# Patient Record
Sex: FEMALE | Race: WHITE | NOT HISPANIC OR LATINO | Employment: FULL TIME | URBAN - METROPOLITAN AREA
[De-identification: names, ages, dates, MRNs, and addresses within clinical notes are randomized per-mention and may not be internally consistent; named-entity substitution may affect disease eponyms.]

---

## 2017-02-01 ENCOUNTER — OFFICE VISIT (OUTPATIENT)
Dept: FAMILY MEDICINE CLINIC | Facility: CLINIC | Age: 52
End: 2017-02-01

## 2017-02-01 VITALS
BODY MASS INDEX: 19.28 KG/M2 | OXYGEN SATURATION: 98 % | WEIGHT: 115.7 LBS | SYSTOLIC BLOOD PRESSURE: 116 MMHG | HEIGHT: 65 IN | HEART RATE: 69 BPM | TEMPERATURE: 98.3 F | DIASTOLIC BLOOD PRESSURE: 76 MMHG

## 2017-02-01 DIAGNOSIS — J01.90 ACUTE NON-RECURRENT SINUSITIS, UNSPECIFIED LOCATION: Primary | ICD-10-CM

## 2017-02-01 PROCEDURE — 99213 OFFICE O/P EST LOW 20 MIN: CPT | Performed by: INTERNAL MEDICINE

## 2017-02-01 RX ORDER — AMOXICILLIN AND CLAVULANATE POTASSIUM 875; 125 MG/1; MG/1
1 TABLET, FILM COATED ORAL 2 TIMES DAILY
Qty: 20 TABLET | Refills: 0 | Status: SHIPPED | OUTPATIENT
Start: 2017-02-01 | End: 2017-03-22

## 2017-02-01 NOTE — PROGRESS NOTES
"Subjective   Iliana Reyes is a 51 y.o. female who presents today for:    Sinus Problem    History of Present Illness     Head congestion with some runny nose and sore throat started 1 month ago.  This was treated with prescription strength antihistamine/decongestant to an immediate care center.  She has not been on any antibiotics.  She improved initially, but now has worsening congestion with intermittent headaches.    Mother has been diagnosed with MRSA in the sinuses.    Ms. Reyes  reports that she quit smoking about 3 years ago. She has a 20.00 pack-year smoking history. She has never used smokeless tobacco. She reports that she drinks about 1.2 oz of alcohol per week  She reports that she does not use illicit drugs.         Current Outpatient Prescriptions:   •  betamethasone, augmented, (DIPROLENE) 0.05 % cream, , Disp: , Rfl: 1  •  diphenhydrAMINE (BENADRYL) 25 mg, Take 25 mg by mouth every 6 (six) hours as needed for itching., Disp: , Rfl:   •  estradiol (MINIVELLE, VIVELLE-DOT) 0.05 MG/24HR patch, Place 1 patch on the skin 2 (two) times a week., Disp: 8 patch, Rfl: 12  •  Multiple Vitamins-Minerals (MULTIVITAMIN ADULT PO), Take  by mouth., Disp: , Rfl:   •  Probiotic Product (PROBIOTIC DAILY PO), Take  by mouth., Disp: , Rfl:       The following portions of the patient's history were reviewed and updated as appropriate: allergies, current medications, past social history and problem list.    Review of Systems   HENT: Positive for congestion, postnasal drip, sinus pressure, sneezing and sore throat.    Respiratory: Positive for cough.    Neurological: Positive for headaches.       Objective   Vitals:    02/01/17 1124   BP: 116/76   BP Location: Right arm   Patient Position: Sitting   Cuff Size: Adult   Pulse: 69   Temp: 98.3 °F (36.8 °C)   TempSrc: Oral   SpO2: 98%   Weight: 115 lb 11.2 oz (52.5 kg)   Height: 65\" (165.1 cm)     Physical Exam   Constitutional: She is oriented to person, place, and time. " She appears well-developed and well-nourished. No distress.   HENT:   Right Ear: External ear and ear canal normal. Tympanic membrane is not erythematous. A middle ear effusion is present.   Left Ear: External ear and ear canal normal. Tympanic membrane is not erythematous. A middle ear effusion is present.   Nose: Mucosal edema and rhinorrhea present.   Mouth/Throat: Posterior oropharyngeal edema and posterior oropharyngeal erythema present. No oropharyngeal exudate.   Eyes: Conjunctivae are normal. Right eye exhibits no discharge. Left eye exhibits no discharge. No scleral icterus.   Cardiovascular: Normal rate and regular rhythm.    Pulmonary/Chest: Effort normal and breath sounds normal.   Lymphadenopathy:     She has no cervical adenopathy.        Right: No supraclavicular adenopathy present.        Left: No supraclavicular adenopathy present.   Neurological: She is alert and oriented to person, place, and time.         Assessment/Plan   Iliana was seen today for sinus problem.    Diagnoses and all orders for this visit:    Acute non-recurrent sinusitis, unspecified location  Comments:  Saline nasal spray 3-4 times/day and every 1 hour as needed. And the ABX until gone.    -     amoxicillin-clavulanate (AUGMENTIN) 875-125 MG per tablet; Take 1 tablet by mouth 2 (Two) Times a Day.    RTO prn.

## 2017-02-09 RX ORDER — FLUCONAZOLE 150 MG/1
TABLET ORAL
Qty: 2 TABLET | Refills: 0 | Status: SHIPPED | OUTPATIENT
Start: 2017-02-09 | End: 2017-03-22

## 2017-02-10 ENCOUNTER — OFFICE VISIT (OUTPATIENT)
Dept: FAMILY MEDICINE CLINIC | Facility: CLINIC | Age: 52
End: 2017-02-10

## 2017-02-10 VITALS
OXYGEN SATURATION: 99 % | DIASTOLIC BLOOD PRESSURE: 60 MMHG | HEART RATE: 76 BPM | WEIGHT: 117 LBS | BODY MASS INDEX: 19.49 KG/M2 | TEMPERATURE: 98.2 F | HEIGHT: 65 IN | SYSTOLIC BLOOD PRESSURE: 102 MMHG

## 2017-02-10 DIAGNOSIS — R68.89 FLU-LIKE SYMPTOMS: Primary | ICD-10-CM

## 2017-02-10 DIAGNOSIS — J32.9 CHRONIC SINUSITIS, UNSPECIFIED LOCATION: ICD-10-CM

## 2017-02-10 LAB
EXPIRATION DATE: NORMAL
FLUAV AG NPH QL: NEGATIVE
FLUBV AG NPH QL: NEGATIVE
INTERNAL CONTROL: NORMAL
Lab: NORMAL

## 2017-02-10 PROCEDURE — 87804 INFLUENZA ASSAY W/OPTIC: CPT | Performed by: NURSE PRACTITIONER

## 2017-02-10 PROCEDURE — 99213 OFFICE O/P EST LOW 20 MIN: CPT | Performed by: NURSE PRACTITIONER

## 2017-02-10 RX ORDER — PREDNISONE 10 MG/1
TABLET ORAL
Qty: 1 EACH | Refills: 0 | Status: SHIPPED | OUTPATIENT
Start: 2017-02-10 | End: 2017-03-22

## 2017-02-10 NOTE — PROGRESS NOTES
Subjective   Iliana Reyes is a 51 y.o. female.     Flu Symptoms   This is a chronic (She is a patient of Dr. Reyes, this is my first time seeing her and this is a new problem to me.) problem. Episode onset: She has been feeling ill for several weeks despite trying Stahist and Augmentin.  The problem occurs constantly. The problem has been gradually worsening. Associated symptoms include chills, congestion, coughing, diaphoresis, headaches and myalgias (Generalized). Pertinent negatives include no sore throat (Mild laryngitis) or vomiting. She has tried NSAIDs and position changes for the symptoms. The treatment provided mild relief.     I have reviewed the patient's medical history in detail and updated the computerized patient record.    The following portions of the patient's history were reviewed and updated as appropriate: allergies, current medications, past family history, past medical history, past social history, past surgical history and problem list.    Review of Systems   Constitutional: Positive for chills and diaphoresis.   HENT: Positive for congestion. Negative for sore throat (Mild laryngitis).    Respiratory: Positive for cough.    Gastrointestinal: Negative for vomiting.   Musculoskeletal: Positive for myalgias (Generalized).   Neurological: Positive for headaches.       Objective      Current Outpatient Prescriptions on File Prior to Visit   Medication Sig Dispense Refill   • amoxicillin-clavulanate (AUGMENTIN) 875-125 MG per tablet Take 1 tablet by mouth 2 (Two) Times a Day. 20 tablet 0   • betamethasone, augmented, (DIPROLENE) 0.05 % cream   1   • diphenhydrAMINE (BENADRYL) 25 mg Take 25 mg by mouth every 6 (six) hours as needed for itching.     • estradiol (MINIVELLE, VIVELLE-DOT) 0.05 MG/24HR patch Place 1 patch on the skin 2 (two) times a week. 8 patch 12   • fluconazole (DIFLUCAN) 150 MG tablet 1 now and 1 in 4 days if needed. 2 tablet 0   • Multiple Vitamins-Minerals (MULTIVITAMIN  ADULT PO) Take  by mouth.     • Probiotic Product (PROBIOTIC DAILY PO) Take  by mouth.       No current facility-administered medications on file prior to visit.      Vitals:    02/10/17 1300   BP: 102/60   Pulse: 76   Temp: 98.2 °F (36.8 °C)   SpO2: 99%       Physical Exam   Constitutional: She is oriented to person, place, and time. She appears well-developed and well-nourished.   HENT:   Head: Normocephalic.   Right Ear: Hearing, tympanic membrane, external ear and ear canal normal.   Left Ear: Hearing, external ear and ear canal normal. A middle ear effusion (clear effusion present) is present.   Nose: Mucosal edema (turbinates are red and swollen), rhinorrhea and sinus tenderness present. Right sinus exhibits maxillary sinus tenderness and frontal sinus tenderness. Left sinus exhibits maxillary sinus tenderness and frontal sinus tenderness.   Cardiovascular: Normal rate and regular rhythm.  Exam reveals no gallop and no friction rub.    No murmur heard.  Pulmonary/Chest: Effort normal and breath sounds normal.   Neurological: She is alert and oriented to person, place, and time.   Skin: Skin is warm and dry.   Nursing note and vitals reviewed.      Assessment/Plan   Iliana was seen today for flu symptoms.    Diagnoses and all orders for this visit:    Flu-like symptoms  -     POC Influenza A / B    1. Instructed to finish Augmentin.   2. Prednisone 10 mg (48) tablet pack  3. Instructed to increase water intake and get plenty of rest.   4. May use ibuprofen for pain and inflammation.

## 2017-02-13 LAB
BACTERIA SPEC RESP CULT: NORMAL
BACTERIA SPEC RESP CULT: NORMAL

## 2017-03-07 ENCOUNTER — TELEPHONE (OUTPATIENT)
Dept: GASTROENTEROLOGY | Facility: CLINIC | Age: 52
End: 2017-03-07

## 2017-03-07 NOTE — TELEPHONE ENCOUNTER
Received letter from [facility] that Dexilant is not covered by the plan. They did cover it last year. I resubmitted the PA as an appeal with additional documentation.

## 2017-03-10 ENCOUNTER — TELEPHONE (OUTPATIENT)
Dept: GASTROENTEROLOGY | Facility: CLINIC | Age: 52
End: 2017-03-10

## 2017-03-10 RX ORDER — PANTOPRAZOLE SODIUM 40 MG/1
40 TABLET, DELAYED RELEASE ORAL DAILY
Qty: 90 TABLET | Refills: 1 | Status: SHIPPED | OUTPATIENT
Start: 2017-03-10 | End: 2018-08-13 | Stop reason: ALTCHOICE

## 2017-03-10 NOTE — TELEPHONE ENCOUNTER
"PA denied for Dexilant. \"The medication requested is not covered by [employer] and [facility] for the member based on current plan benefits.\"  "

## 2017-03-10 NOTE — TELEPHONE ENCOUNTER
Pt called and advised that her pa was denied on her dexilant.  Pt verb understanding and requested we try a new ppi.  Advised the pt we would escribe pantoprazole to her pharmacy.  Pt verb understanding.

## 2017-03-22 ENCOUNTER — OFFICE VISIT (OUTPATIENT)
Dept: FAMILY MEDICINE CLINIC | Facility: CLINIC | Age: 52
End: 2017-03-22

## 2017-03-22 VITALS
SYSTOLIC BLOOD PRESSURE: 124 MMHG | OXYGEN SATURATION: 98 % | BODY MASS INDEX: 20.16 KG/M2 | TEMPERATURE: 98 F | WEIGHT: 121 LBS | HEART RATE: 81 BPM | DIASTOLIC BLOOD PRESSURE: 70 MMHG | HEIGHT: 65 IN

## 2017-03-22 DIAGNOSIS — J32.9 CHRONIC SINUSITIS, UNSPECIFIED LOCATION: Primary | ICD-10-CM

## 2017-03-22 PROCEDURE — 99213 OFFICE O/P EST LOW 20 MIN: CPT | Performed by: NURSE PRACTITIONER

## 2017-03-22 RX ORDER — LUBIPROSTONE 8 UG/1
8 CAPSULE, GELATIN COATED ORAL 2 TIMES DAILY
Refills: 3 | COMMUNITY
Start: 2017-03-06 | End: 2017-10-30 | Stop reason: SINTOL

## 2017-03-22 RX ORDER — DOXYCYCLINE HYCLATE 100 MG/1
100 TABLET, DELAYED RELEASE ORAL DAILY
Qty: 21 TABLET | Refills: 0 | Status: SHIPPED | OUTPATIENT
Start: 2017-03-22 | End: 2017-10-30

## 2017-03-22 RX ORDER — FLUCONAZOLE 100 MG/1
100 TABLET ORAL DAILY
Qty: 3 TABLET | Refills: 2 | Status: SHIPPED | OUTPATIENT
Start: 2017-03-22 | End: 2017-10-30

## 2017-03-22 NOTE — PROGRESS NOTES
Subjective   Iliana Reyes is a 51 y.o. female who presents today for:    Nasal Congestion; Ear Fullness; and Sore Throat    URI    This is a recurrent problem. The current episode started more than 1 month ago (since the first of the year). The problem has been waxing and waning. There has been no fever. Associated symptoms include congestion, coughing, a plugged ear sensation, rhinorrhea and a sore throat. She has tried antihistamine and decongestant (was on antibiotics at one point) for the symptoms. The treatment provided mild relief.      I have reviewed the patient's medical history in detail and updated the computerized patient record.    Ms. Reyes  reports that she quit smoking about 3 years ago. She has a 20.00 pack-year smoking history. She has never used smokeless tobacco. She reports that she drinks about 1.2 oz of alcohol per week  She reports that she does not use illicit drugs.         Current Outpatient Prescriptions:   •  AMITIZA 8 MCG capsule, Take 8 mcg by mouth 2 (Two) Times a Day., Disp: , Rfl: 3  •  betamethasone, augmented, (DIPROLENE) 0.05 % cream, , Disp: , Rfl: 1  •  diphenhydrAMINE (BENADRYL) 25 mg, Take 25 mg by mouth every 6 (six) hours as needed for itching., Disp: , Rfl:   •  estradiol (MINIVELLE, VIVELLE-DOT) 0.05 MG/24HR patch, Place 1 patch on the skin 2 (two) times a week., Disp: 8 patch, Rfl: 12  •  Multiple Vitamins-Minerals (MULTIVITAMIN ADULT PO), Take  by mouth., Disp: , Rfl:   •  pantoprazole (PROTONIX) 40 MG EC tablet, Take 1 tablet by mouth Daily., Disp: 90 tablet, Rfl: 1      The following portions of the patient's history were reviewed and updated as appropriate: allergies, current medications, past social history and problem list.    Review of Systems   Constitutional: Negative.    HENT: Positive for congestion, postnasal drip, rhinorrhea, sinus pressure, sore throat and voice change.    Respiratory: Positive for cough. Negative for shortness of breath.   "  Cardiovascular: Negative.    All other systems reviewed and are negative.        Objective   Vitals:    03/22/17 1552   BP: 124/70   BP Location: Left arm   Patient Position: Sitting   Cuff Size: Small Adult   Pulse: 81   Temp: 98 °F (36.7 °C)   TempSrc: Oral   SpO2: 98%   Weight: 121 lb (54.9 kg)   Height: 65\" (165.1 cm)     Physical Exam   HENT:   Right Ear: Hearing, tympanic membrane, external ear and ear canal normal.   Left Ear: Hearing, tympanic membrane, external ear and ear canal normal.   Nose: Mucosal edema (turbintes are red and swollen) and rhinorrhea (clear drainage) present. Right sinus exhibits maxillary sinus tenderness and frontal sinus tenderness. Left sinus exhibits maxillary sinus tenderness and frontal sinus tenderness.   Mouth/Throat: Uvula is midline and mucous membranes are normal. Posterior oropharyngeal erythema present. No oropharyngeal exudate.   Neck: Normal range of motion. Neck supple. No JVD present. No tracheal deviation present. No thyromegaly present.   Cardiovascular: Normal rate, regular rhythm, normal heart sounds and intact distal pulses.    Pulmonary/Chest: Effort normal and breath sounds normal. She has no wheezes. She has no rales.   Lymphadenopathy:     She has no cervical adenopathy.         Assessment/Plan   Iliana was seen today for nasal congestion, ear fullness and sore throat.    Diagnoses and all orders for this visit:    Chronic sinusitis, unspecified location  -     fluconazole (DIFLUCAN) 100 MG tablet; Take 1 tablet by mouth Daily.  -     doxycycline (DORYX) 100 MG enteric coated tablet; Take 1 tablet by mouth Daily.    You have been diagnosed with acute sinusitis. You have been prescribed an antibiotic along with symptomatic treatment.  You may take Mucinex D for relieving congestion and cough.  If you have high blood pressure, do not take Mucinex D, instead opting for plain Mucinex and Coricidin HBP.  Take Ibuprofen or Tylenol as needed for pain or fever.  Oral " antihistamine, such as Zyrtec or Claritin may help reduce ear pressure and relieve some nasal symptoms.  A saline nasal spray may be used to keep nose clear from discharge.  Be sure that you are increasing your intake of clear to decaffeinated fluids and get plenty of rest.  If your symptoms worsen or persist follow up as needed.

## 2017-03-30 ENCOUNTER — OFFICE VISIT (OUTPATIENT)
Dept: OBSTETRICS AND GYNECOLOGY | Facility: CLINIC | Age: 52
End: 2017-03-30

## 2017-03-30 ENCOUNTER — HOSPITAL ENCOUNTER (OUTPATIENT)
Dept: MAMMOGRAPHY | Facility: HOSPITAL | Age: 52
Discharge: HOME | End: 2017-03-30
Admitting: NURSE PRACTITIONER

## 2017-03-30 VITALS
SYSTOLIC BLOOD PRESSURE: 114 MMHG | BODY MASS INDEX: 19.69 KG/M2 | HEIGHT: 65 IN | DIASTOLIC BLOOD PRESSURE: 64 MMHG | WEIGHT: 118.2 LBS

## 2017-03-30 DIAGNOSIS — Z12.31 ENCOUNTER FOR SCREENING MAMMOGRAM FOR BREAST CANCER: ICD-10-CM

## 2017-03-30 DIAGNOSIS — Z01.419 WELL WOMAN EXAM WITH ROUTINE GYNECOLOGICAL EXAM: Primary | ICD-10-CM

## 2017-03-30 DIAGNOSIS — N95.1 VASOMOTOR SYMPTOMS DUE TO MENOPAUSE: ICD-10-CM

## 2017-03-30 PROCEDURE — 99396 PREV VISIT EST AGE 40-64: CPT | Performed by: NURSE PRACTITIONER

## 2017-03-30 PROCEDURE — G0202 SCR MAMMO BI INCL CAD: HCPCS

## 2017-03-30 RX ORDER — ESTRADIOL 0.03 MG/D
1 FILM, EXTENDED RELEASE TRANSDERMAL 2 TIMES WEEKLY
Qty: 4 PATCH | Refills: 12 | Status: SHIPPED | OUTPATIENT
Start: 2017-03-30 | End: 2018-02-05 | Stop reason: SDUPTHER

## 2017-03-30 NOTE — PROGRESS NOTES
Subjective   History of Present Illness    Iliana Reyes is a 51 y.o. female who presents for annual exam. She is a former pt of Dr. Mensah. Last exam 2016. Hysterectomy w/ bso  for bleeding. She denies any hx at all of abnormal pap smears and states she stopped getting paps after her hysterectomy. She has been on Minivelle since her hysterectomy for vasomotor sxs. She states the minivelle controls her hot flashes well but has noticed significant breast tenderness x about 5 months. She denies any lumps, associated skin changes, or nipple changes bilaterally. She had her screening mammogram today and states she has had normal yearly mammograms to date.  Colonoscopy less than a year. No other complaints today.    Chief Complaint: Iliana presents for annual exam     Obstetric History:  OB History      Para Term  AB TAB SAB Ectopic Multiple Living    2 2 2                Menstrual History:     No LMP recorded (lmp unknown). Patient has had a hysterectomy.       Sexual History:     The following portions of the patient's history were reviewed and updated as appropriate: allergies, current medications, past family history, past medical history, past social history, past surgical history and problem list.      Review of Systems   Constitutional: Negative.    HENT: Negative.    Eyes: Negative for visual disturbance.   Respiratory: Negative for cough, shortness of breath and wheezing.    Cardiovascular: Negative for chest pain, palpitations and leg swelling.   Gastrointestinal: Negative for abdominal distention, abdominal pain, blood in stool, constipation, diarrhea, nausea and vomiting.   Endocrine: Negative for cold intolerance and heat intolerance.   Genitourinary: Negative for difficulty urinating, dyspareunia, dysuria, frequency, genital sores, hematuria, menstrual problem, pelvic pain, urgency, vaginal bleeding, vaginal discharge and vaginal pain.   Musculoskeletal: Negative.    Skin:  "Negative.    Neurological: Negative for dizziness, weakness, light-headedness, numbness and headaches.   Hematological: Negative.    Psychiatric/Behavioral: Negative.    Breasts: see HPI         Objective   Physical Exam   Constitutional: She is oriented to person, place, and time. She appears well-developed and well-nourished. No distress.   Neck: No thyromegaly present.   Cardiovascular: Normal rate, regular rhythm and normal heart sounds.    No murmur heard.  Pulmonary/Chest: Effort normal and breath sounds normal. No respiratory distress. She has no wheezes. Right breast exhibits tenderness. Right breast exhibits no mass, no nipple discharge and no skin change. Left breast exhibits tenderness. Left breast exhibits no mass, no nipple discharge and no skin change. Breasts are symmetrical. There is no breast swelling.   Abdominal: Soft. Bowel sounds are normal. She exhibits no distension. There is no tenderness.   Genitourinary: Vagina normal and uterus normal. There is no rash, tenderness or lesion on the right labia. There is no rash, tenderness or lesion on the left labia. Uterus is not enlarged and not tender. Cervix exhibits no motion tenderness and no discharge. Right adnexum displays no mass, no tenderness and no fullness. Left adnexum displays no mass, no tenderness and no fullness. No bleeding in the vagina. No vaginal discharge found.   Musculoskeletal: Normal range of motion. She exhibits no edema.   Lymphadenopathy:     She has no cervical adenopathy.   Neurological: She is alert and oriented to person, place, and time.   Skin: No rash noted. No erythema. No pallor.   Psychiatric: She has a normal mood and affect. Her behavior is normal. Judgment and thought content normal.       /64  Ht 65\" (165.1 cm)  Wt 118 lb 3.2 oz (53.6 kg)  LMP  (LMP Unknown)  Breastfeeding? No  BMI 19.67 kg/m2    Assessment/Plan   Iliana was seen today for gynecologic exam.    Diagnoses and all orders for this " visit:    Well woman exam with routine gynecological exam    Vasomotor symptoms due to menopause    Other orders  -     estradiol (MINIVELLE) 0.025 MG/24HR patch; Place 1 patch on the skin 2 (Two) Times a Week.      All questions answered.  Breast self exam technique reviewed and patient encouraged to perform self-exam monthly.  Discussed healthy lifestyle modifications.      Normal gynecological exam today. Counseled Iliana on paps after hysterectomy and the small chance of picking up vaginal cancer on a pap. Iliana declines a pap today.  Counseled on breast tenderness likely related to minivelle therapy. Risks, benefits, and alternatives of HRT discussed. [facility] initiative discussed. Recommend HRT for the shortest duration of time on the lowest effect dose. Iliana verbalizes understanding and desires to continue on HRT at this time but is willing to adjust current therapy.  Recommend we lower minivelle dose and monitor breast/vasomotor sxs. She will call if breast tenderness does not resolve in several weeks. Await mammogram results as well. Iliana is to f/u yearly or sooner for problems.    Naty Gautam, APRN

## 2017-04-03 ENCOUNTER — OFFICE VISIT (OUTPATIENT)
Dept: FAMILY MEDICINE CLINIC | Facility: CLINIC | Age: 52
End: 2017-04-03

## 2017-04-03 VITALS
DIASTOLIC BLOOD PRESSURE: 78 MMHG | TEMPERATURE: 97.7 F | HEART RATE: 78 BPM | BODY MASS INDEX: 19.61 KG/M2 | WEIGHT: 117.7 LBS | OXYGEN SATURATION: 100 % | HEIGHT: 65 IN | SYSTOLIC BLOOD PRESSURE: 100 MMHG

## 2017-04-03 DIAGNOSIS — J01.91 ACUTE RECURRENT SINUSITIS, UNSPECIFIED LOCATION: ICD-10-CM

## 2017-04-03 DIAGNOSIS — J30.9 ALLERGIC RHINITIS, UNSPECIFIED ALLERGIC RHINITIS TRIGGER, UNSPECIFIED RHINITIS SEASONALITY: Primary | ICD-10-CM

## 2017-04-03 PROCEDURE — 99213 OFFICE O/P EST LOW 20 MIN: CPT | Performed by: NURSE PRACTITIONER

## 2017-04-03 NOTE — PROGRESS NOTES
Subjective   Iliana Reyes is a 51 y.o. female who presents today for:    URI (x 10 days); Shortness of Breath; and Sore Throat    HPI Comments: Ms Reyes presents today with complaints of cough, sinus congestion, and her throat feels swollen. She was seen by me on 3/22/17 for these same symptoms. She was prescribed a 21 day course of Doxycycline 100 mg. She was also advised to take Mucinex D and an OTC antihistamine for her symptoms. She reports that she does not feel any better, but she does not feel as if her symptoms are worsening.    URI    This is a chronic problem. The current episode started 1 to 4 weeks ago. The problem has been unchanged. There has been no fever. Associated symptoms include congestion and coughing. Sore throat: feels swollen. She has tried antihistamine (Doxycyclin) for the symptoms. The treatment provided no relief.      I have reviewed the patient's medical history in detail and updated the computerized patient record.    Ms. Reyes  reports that she quit smoking about 3 years ago. She has a 20.00 pack-year smoking history. She has never used smokeless tobacco. She reports that she drinks about 1.2 oz of alcohol per week  She reports that she does not use illicit drugs.         Current Outpatient Prescriptions:   •  AMITIZA 8 MCG capsule, Take 8 mcg by mouth 2 (Two) Times a Day., Disp: , Rfl: 3  •  betamethasone, augmented, (DIPROLENE) 0.05 % cream, , Disp: , Rfl: 1  •  diphenhydrAMINE (BENADRYL) 25 mg, Take 25 mg by mouth every 6 (six) hours as needed for itching., Disp: , Rfl:   •  doxycycline (DORYX) 100 MG enteric coated tablet, Take 1 tablet by mouth Daily., Disp: 21 tablet, Rfl: 0  •  estradiol (MINIVELLE) 0.025 MG/24HR patch, Place 1 patch on the skin 2 (Two) Times a Week., Disp: 4 patch, Rfl: 12  •  Multiple Vitamins-Minerals (MULTIVITAMIN ADULT PO), Take  by mouth., Disp: , Rfl:   •  pantoprazole (PROTONIX) 40 MG EC tablet, Take 1 tablet by mouth Daily., Disp: 90 tablet, Rfl:  "1  •  fluconazole (DIFLUCAN) 100 MG tablet, Take 1 tablet by mouth Daily., Disp: 3 tablet, Rfl: 2      The following portions of the patient's history were reviewed and updated as appropriate: allergies, current medications, past social history and problem list.    Review of Systems   Constitutional: Negative.  Negative for chills, fatigue and fever.   HENT: Positive for congestion and sinus pressure. Sore throat: feels swollen.    Respiratory: Positive for cough and shortness of breath (at times).    Cardiovascular: Negative.    Musculoskeletal: Negative.    Skin: Negative.    Neurological: Negative.          Objective   Vitals:    04/03/17 1409   BP: 100/78   BP Location: Left arm   Patient Position: Sitting   Cuff Size: Adult   Pulse: 78   Temp: 97.7 °F (36.5 °C)   TempSrc: Oral   SpO2: 100%   Weight: 117 lb 11.2 oz (53.4 kg)   Height: 65\" (165.1 cm)     Physical Exam   Constitutional: She is oriented to person, place, and time. She appears well-developed and well-nourished.   HENT:   Right Ear: Hearing, tympanic membrane, external ear and ear canal normal.   Left Ear: Hearing, tympanic membrane, external ear and ear canal normal.   Nose: Mucosal edema (turbinates are red and swollen) and rhinorrhea (clear drainage) present. Right sinus exhibits no maxillary sinus tenderness and no frontal sinus tenderness. Left sinus exhibits no maxillary sinus tenderness and no frontal sinus tenderness.   Mouth/Throat: Uvula is midline, oropharynx is clear and moist and mucous membranes are normal.   Neck: Normal range of motion. Neck supple.   Cardiovascular: Normal rate, regular rhythm, normal heart sounds and intact distal pulses.    Pulmonary/Chest: Effort normal and breath sounds normal. No respiratory distress. She has no wheezes. She has no rales.   Lymphadenopathy:     She has no cervical adenopathy.   Neurological: She is alert and oriented to person, place, and time.   Skin: Skin is warm and dry.   Vitals " reviewed.        Assessment/Plan   Iliana was seen today for uri, shortness of breath and sore throat.    Diagnoses and all orders for this visit:    Allergic rhinitis, unspecified allergic rhinitis trigger, unspecified rhinitis seasonality    Acute recurrent sinusitis, unspecified location    1. She is to continue the antibiotic as prescribed until she has finished it. I have advised her to use a saline nasal rinse twice a day and to switch to Xyxal OTC instead of the OTC antihistamine she is currently using. We have discussed that it may take several weeks for her to see an improvement in her symptoms because of the underlying allergy symptoms she is also having. We have discussed that if she does not feel any better in 2 weeks we will consider sending her to an ENT.  2. Follow up as needed.

## 2017-05-10 ENCOUNTER — TELEPHONE (OUTPATIENT)
Dept: OBSTETRICS AND GYNECOLOGY | Facility: CLINIC | Age: 52
End: 2017-05-10

## 2017-08-18 ENCOUNTER — TELEPHONE (OUTPATIENT)
Dept: OBSTETRICS AND GYNECOLOGY | Facility: CLINIC | Age: 52
End: 2017-08-18

## 2017-10-30 ENCOUNTER — OFFICE VISIT (OUTPATIENT)
Dept: FAMILY MEDICINE CLINIC | Facility: CLINIC | Age: 52
End: 2017-10-30

## 2017-10-30 VITALS
WEIGHT: 118.5 LBS | HEIGHT: 65 IN | TEMPERATURE: 98.7 F | OXYGEN SATURATION: 98 % | BODY MASS INDEX: 19.74 KG/M2 | SYSTOLIC BLOOD PRESSURE: 104 MMHG | DIASTOLIC BLOOD PRESSURE: 72 MMHG | HEART RATE: 73 BPM

## 2017-10-30 DIAGNOSIS — M54.31 SCIATICA OF RIGHT SIDE: ICD-10-CM

## 2017-10-30 DIAGNOSIS — M25.551 PAIN OF RIGHT HIP JOINT: Primary | ICD-10-CM

## 2017-10-30 PROCEDURE — 99214 OFFICE O/P EST MOD 30 MIN: CPT | Performed by: NURSE PRACTITIONER

## 2017-10-30 NOTE — PROGRESS NOTES
Subjective   Iliana Reyes is a 51 y.o. female who presents today for:    Hip Pain (Rt hip pain from hip that radiates down leg. No injury)    HPI Comments: Ms. Reyes presents today complaining of right hip pain x 2-3 weeks. She denies any mechanism of injury or fall. The pain originates in her right buttock and radiates around to the right hip where it is the worst, and down the right leg. Pain described as shooting/aching, as is worse when lying down. She states that she is having trouble crossing her legs when sitting because of the pain. The pain has not affected her gait. She denies swelling, numbness, or tingling to either lower extremity. Denies popping or cracking sounds upon movement. Treatments that she has tried at home include naproxen and hot baths, which she states have not helped her pain.      I have reviewed the patient's medical history in detail and updated the computerized patient record.    Ms. Reyes  reports that she quit smoking about 3 years ago. She has a 20.00 pack-year smoking history. She has never used smokeless tobacco. She reports that she drinks about 1.2 oz of alcohol per week  She reports that she does not use illicit drugs.     Allergies   Allergen Reactions   • Codeine Hives   • Pantoprazole Nausea And Vomiting       Current Outpatient Prescriptions:   •  betamethasone, augmented, (DIPROLENE) 0.05 % cream, , Disp: , Rfl: 1  •  diphenhydrAMINE (BENADRYL) 25 mg, Take 25 mg by mouth every 6 (six) hours as needed for itching., Disp: , Rfl:   •  estradiol (MINIVELLE) 0.025 MG/24HR patch, Place 1 patch on the skin 2 (Two) Times a Week., Disp: 4 patch, Rfl: 12  •  Multiple Vitamins-Minerals (MULTIVITAMIN ADULT PO), Take  by mouth., Disp: , Rfl:   •  pantoprazole (PROTONIX) 40 MG EC tablet, Take 1 tablet by mouth Daily., Disp: 90 tablet, Rfl: 1      Review of Systems   Constitutional: Negative for activity change, chills, fatigue and fever.   HENT: Negative for congestion, postnasal  "drip, rhinorrhea, sinus pain and sore throat.    Eyes: Negative.    Respiratory: Negative for cough, chest tightness, shortness of breath and wheezing.    Cardiovascular: Negative for chest pain, palpitations and leg swelling.   Gastrointestinal: Negative for abdominal distention, abdominal pain, constipation, diarrhea, nausea and vomiting.   Endocrine: Negative.    Genitourinary: Negative for dysuria, frequency and urgency.   Musculoskeletal:        Right hip shooting/aching pain and stiffness   Skin: Negative for color change, rash and wound.   Neurological: Negative.  Negative for weakness and numbness.   Psychiatric/Behavioral: Negative.          Objective   Vitals:    10/30/17 0841   BP: 104/72   BP Location: Left arm   Patient Position: Sitting   Cuff Size: Adult   Pulse: 73   Temp: 98.7 °F (37.1 °C)   TempSrc: Oral   SpO2: 98%   Weight: 118 lb 8 oz (53.8 kg)   Height: 65\" (165.1 cm)     Physical Exam   Constitutional: She is oriented to person, place, and time. She appears well-developed and well-nourished.   HENT:   Head: Normocephalic.   Eyes: EOM are normal. Pupils are equal, round, and reactive to light.   Musculoskeletal: She exhibits tenderness (right hip tenderness on movement). She exhibits no edema.        Right hip: She exhibits decreased range of motion and tenderness. She exhibits no swelling and no crepitus.   Right hip aching/shooting pain on lying and crossing legs    Neurological: She is alert and oriented to person, place, and time.   Skin: Skin is warm and dry.   Psychiatric:   No acute abnormality   Vitals reviewed.        Assessment/Plan   Iliana was seen today for hip pain.    Diagnoses and all orders for this visit:    Pain of right hip joint  -     Ambulatory Referral to Physical Therapy Evaluate and treat    Sciatica of right side    1. I have ordered physical therapy, as patient's symptoms have persisted for three weeks and NSAIDs have not helped. Educated patient to continue hot baths " for relief, and to try tylenol or ibuprofen in the meantime, as naproxen hasn't provided relief.     2. Follow up as needed if symptoms worsen or persist, and at next scheduled appointment.

## 2018-02-05 RX ORDER — ESTRADIOL 0.03 MG/D
FILM, EXTENDED RELEASE TRANSDERMAL
Qty: 8 PATCH | Refills: 0 | Status: SHIPPED | OUTPATIENT
Start: 2018-02-05 | End: 2018-02-28 | Stop reason: SDUPTHER

## 2018-02-16 RX ORDER — LUBIPROSTONE 8 UG/1
CAPSULE, GELATIN COATED ORAL
Qty: 180 CAPSULE | Refills: 0 | OUTPATIENT
Start: 2018-02-16

## 2018-03-08 ENCOUNTER — TRANSCRIBE ORDERS (OUTPATIENT)
Dept: ADMINISTRATIVE | Facility: HOSPITAL | Age: 53
End: 2018-03-08

## 2018-03-08 DIAGNOSIS — Z12.31 VISIT FOR SCREENING MAMMOGRAM: Primary | ICD-10-CM

## 2018-03-12 RX ORDER — ESTRADIOL 0.03 MG/D
FILM, EXTENDED RELEASE TRANSDERMAL
Qty: 8 PATCH | Refills: 0 | Status: SHIPPED | OUTPATIENT
Start: 2018-03-12 | End: 2018-03-13 | Stop reason: SDUPTHER

## 2018-03-12 RX ORDER — ESTRADIOL 0.03 MG/D
FILM, EXTENDED RELEASE TRANSDERMAL
Qty: 8 PATCH | Refills: 0 | OUTPATIENT
Start: 2018-03-12

## 2018-03-13 RX ORDER — LUBIPROSTONE 8 UG/1
CAPSULE, GELATIN COATED ORAL
Qty: 180 CAPSULE | Refills: 0 | OUTPATIENT
Start: 2018-03-13

## 2018-03-13 RX ORDER — ESTRADIOL 0.03 MG/D
FILM, EXTENDED RELEASE TRANSDERMAL
Qty: 8 PATCH | Refills: 0 | Status: SHIPPED | OUTPATIENT
Start: 2018-03-13 | End: 2018-04-02 | Stop reason: SDUPTHER

## 2018-04-02 ENCOUNTER — HOSPITAL ENCOUNTER (OUTPATIENT)
Dept: MAMMOGRAPHY | Facility: HOSPITAL | Age: 53
Discharge: HOME | End: 2018-04-02
Admitting: NURSE PRACTITIONER

## 2018-04-02 ENCOUNTER — OFFICE VISIT (OUTPATIENT)
Dept: OBSTETRICS AND GYNECOLOGY | Facility: CLINIC | Age: 53
End: 2018-04-02

## 2018-04-02 VITALS
BODY MASS INDEX: 27.4 KG/M2 | HEIGHT: 55 IN | DIASTOLIC BLOOD PRESSURE: 76 MMHG | WEIGHT: 118.4 LBS | SYSTOLIC BLOOD PRESSURE: 118 MMHG

## 2018-04-02 DIAGNOSIS — Z01.419 WELL WOMAN EXAM WITH ROUTINE GYNECOLOGICAL EXAM: Primary | ICD-10-CM

## 2018-04-02 DIAGNOSIS — Z12.31 VISIT FOR SCREENING MAMMOGRAM: ICD-10-CM

## 2018-04-02 PROCEDURE — 99396 PREV VISIT EST AGE 40-64: CPT | Performed by: NURSE PRACTITIONER

## 2018-04-02 PROCEDURE — 77067 SCR MAMMO BI INCL CAD: CPT

## 2018-04-02 RX ORDER — ESTRADIOL 0.03 MG/D
1 FILM, EXTENDED RELEASE TRANSDERMAL 2 TIMES WEEKLY
Qty: 8 PATCH | Refills: 12 | Status: SHIPPED | OUTPATIENT
Start: 2018-04-02 | End: 2019-02-19 | Stop reason: SDUPTHER

## 2018-04-02 NOTE — PROGRESS NOTES
[facility]  Routine Annual Visit    2018    Patient: Iliana Reyes          MR#:[id number]      History of Present Illness     52 y.o. female  who presents for annual exam. Iliana reports breast tenderness has resolved since lowering her minivelle dose to .025. She reports occasional breakthrough night sweats now, but states it is worth not having the breast discomfort. Hysterectomy . Mammogram done today at [facility]. Iliana denies new medical hx. No complaints today. Reports she is up to date on colonoscopy.    No LMP recorded (lmp unknown). Patient has had a hysterectomy.  Obstetric History:  OB History      Para Term  AB Living    2 2 2       SAB TAB Ectopic Molar Multiple Live Births                  Menstrual History:     No LMP recorded (lmp unknown). Patient has had a hysterectomy.       Sexual History:       ________________________________________  Patient Active Problem List   Diagnosis   (none) - all problems resolved or deleted       Past Medical History:   Diagnosis Date   • Chest pain    • Dyspepsia    • Gastric ulcer    • GERD (gastroesophageal reflux disease)    • Headache    • Tubular adenoma of colon        Past Surgical History:   Procedure Laterality Date   • CHOLECYSTECTOMY      Dr. EDER Alba   • COLONOSCOPY  2016    polyp, tics, tubular adenoma   • ENDOSCOPY  2016    gastric ulcer w/clean base, acute gastritis. Leiva's esophagus   • HYSTERECTOMY     • OOPHORECTOMY     • SHOULDER ARTHROSCOPY         History   Smoking Status   • Former Smoker   • Packs/day: 1.00   • Years: 20.00   • Quit date:    Smokeless Tobacco   • Never Used       Family History   Problem Relation Age of Onset   • Heart disease Mother    • Inflammatory bowel disease Mother    • Cerebral aneurysm Father    • Crohn's disease Brother    • Crohn's disease Cousin    • Crohn's disease Cousin        Prior to Admission medications    Medication Sig Start Date End Date  "Taking? Authorizing Provider   betamethasone, augmented, (DIPROLENE) 0.05 % cream  1/28/16  Yes Historical Provider, MD   diphenhydrAMINE (BENADRYL) 25 mg Take 25 mg by mouth every 6 (six) hours as needed for itching.   Yes Historical Provider, MD   estradiol (VIVELLE-DOT) 0.025 MG/24HR patch PLACE 1 PATCH ONTO THE SKIN TWICE A WEEK 3/13/18  Yes OCTAVIO Reyes   Multiple Vitamins-Minerals (MULTIVITAMIN ADULT PO) Take  by mouth.   Yes Historical Provider, MD   pantoprazole (PROTONIX) 40 MG EC tablet Take 1 tablet by mouth Daily. 3/10/17   Cachorro Lyons MD     ________________________________________    The following portions of the patient's history were reviewed and updated as appropriate: allergies, current medications, past family history, past medical history, past social history, past surgical history and problem list.    Review of Systems   Constitutional: Negative.    HENT: Negative.    Eyes: Negative for visual disturbance.   Respiratory: Negative for cough, shortness of breath and wheezing.    Cardiovascular: Negative for chest pain, palpitations and leg swelling.   Gastrointestinal: Negative for abdominal distention, abdominal pain, blood in stool, constipation, diarrhea, nausea and vomiting.   Endocrine: Negative for cold intolerance and heat intolerance.   Genitourinary: Negative for difficulty urinating, dyspareunia, dysuria, frequency, genital sores, hematuria, menstrual problem, pelvic pain, urgency, vaginal bleeding, vaginal discharge and vaginal pain.   Musculoskeletal: Negative.    Skin: Negative.    Neurological: Negative for dizziness, weakness, light-headedness, numbness and headaches.   Hematological: Negative.    Psychiatric/Behavioral: Negative.    Breasts: negative for lumps skin changes, dimpling, swelling, nipple changes/discharge bilaterally         Objective   Physical Exam    /76   Ht 65 cm (25.59\")   Wt 53.7 kg (118 lb 6.4 oz)   LMP  (LMP Unknown) Comment: complete  " "Breastfeeding? No   .11 kg/m²    BP Readings from Last 3 Encounters:   04/02/18 118/76   10/30/17 104/72   04/03/17 100/78      Wt Readings from Last 3 Encounters:   04/02/18 53.7 kg (118 lb 6.4 oz)   10/30/17 53.8 kg (118 lb 8 oz)   04/03/17 53.4 kg (117 lb 11.2 oz)        BMI: Estimated body mass index is 127.11 kg/m² as calculated from the following:    Height as of this encounter: 65 cm (25.59\").    Weight as of this encounter: 53.7 kg (118 lb 6.4 oz).      General:   alert, appears stated age and cooperative   Heart: regular rate and rhythm, S1, S2 normal, no murmur, click, rub or gallop   Lungs: clear to auscultation bilaterally   Abdomen: soft, non-tender, without masses or organomegaly   Breast: inspection negative, no nipple discharge or bleeding, no masses or nodularity palpable   Vulva: normal   Vagina: normal mucosa, normal discharge, vaginal cuff normal   Cervix: absent   Uterus: absent    Adnexa: no mass, fullness, tenderness     Assessment:    1. Normal annual exam   2. HRT- counseled on risks, benefits, alternatives. Patient has been made aware of the black box warming from FDA about heart attacks, strokes, breast cancer, uterine cancer, and VTE risks. WHI discussed. Suggest lowest dose for shortest duration. Pt understands the risks and desires to continue her therapy.  3. Healthy lifestyle modifications discussed, counseled on self breast exams and bone health      Plan:    [x]  Rx: minivelle  [x]  Mammogram today  [x]  PAP done  []  Occult fecal blood test (Ins[facility])  []  Labs:   []  GC/Chl/TV  []  DEXA scan   []  Referral for colonoscopy:           OCTAVIO Barlow  4/2/2018 9:04 AM  "

## 2018-04-06 LAB
CONV .: ABNORMAL
CYTOLOGIST CVX/VAG CYTO: ABNORMAL
CYTOLOGY CVX/VAG DOC THIN PREP: ABNORMAL
DX ICD CODE: ABNORMAL
DX ICD CODE: ABNORMAL
HIV 1 & 2 AB SER-IMP: ABNORMAL
HPV I/H RISK 1 DNA CVX QL PROBE+SIG AMP: POSITIVE
OTHER STN SPEC: ABNORMAL
PATH REPORT.FINAL DX SPEC: ABNORMAL
PATHOLOGIST CVX/VAG CYTO: ABNORMAL
STAT OF ADQ CVX/VAG CYTO-IMP: ABNORMAL

## 2018-04-24 PROBLEM — R87.810 ASCUS WITH POSITIVE HIGH RISK HPV CERVICAL: Status: ACTIVE | Noted: 2018-04-24

## 2018-04-24 PROBLEM — R87.610 ASCUS WITH POSITIVE HIGH RISK HPV CERVICAL: Status: ACTIVE | Noted: 2018-04-24

## 2018-04-25 ENCOUNTER — TELEPHONE (OUTPATIENT)
Dept: OBSTETRICS AND GYNECOLOGY | Age: 53
End: 2018-04-25

## 2018-05-07 ENCOUNTER — OFFICE VISIT (OUTPATIENT)
Dept: OBSTETRICS AND GYNECOLOGY | Facility: CLINIC | Age: 53
End: 2018-05-07

## 2018-05-07 VITALS
WEIGHT: 119.2 LBS | DIASTOLIC BLOOD PRESSURE: 74 MMHG | SYSTOLIC BLOOD PRESSURE: 120 MMHG | BODY MASS INDEX: 127.97 KG/M2

## 2018-05-07 DIAGNOSIS — R87.629 ABNORMAL VAGINAL PAP SMEAR: Primary | ICD-10-CM

## 2018-05-07 PROCEDURE — 57455 BIOPSY OF CERVIX W/SCOPE: CPT | Performed by: OBSTETRICS & GYNECOLOGY

## 2018-05-07 NOTE — PROGRESS NOTES
Colposcopy    Date of procedure:  5/7/2018    Risks and benefits discussed? yes  All questions answered? yes  Consents given by the patient  Written consent obtained? yes    Local anesthesia used:  no    Pre-op indication: ASCUS with POSITIVE high risk HPV  Procedure documentation:    The vagina was initially viewed colposcopically through a green filter.  The vagina was next bathed in acetic acid.   The findings were as follows:      Small area of white epithelium seen at cuff and biopsied and sent to pathology.  Minimal bleeding and the patient tolerated the procedure well.  No mosaic pattern or evidence of high-grade dysplasia.                  Colposcopic Impression: 1. Adequate colposcopy  2. 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      This note was electronically signed.          Miguel Carvajal MD   May 7, 2018

## 2018-05-11 LAB
DX ICD CODE: NORMAL
DX ICD CODE: NORMAL
PATH REPORT.FINAL DX SPEC: NORMAL
PATH REPORT.GROSS SPEC: NORMAL
PATH REPORT.SITE OF ORIGIN SPEC: NORMAL
PATHOLOGIST NAME: NORMAL
PAYMENT PROCEDURE: NORMAL

## 2018-05-14 ENCOUNTER — TELEPHONE (OUTPATIENT)
Dept: OBSTETRICS AND GYNECOLOGY | Facility: CLINIC | Age: 53
End: 2018-05-14

## 2018-05-14 NOTE — TELEPHONE ENCOUNTER
Discussed HERNANDO-1 of vaginal cuff and patient given option of following versus cryosurgery.  She is opted for cryosurgery in the setting up an appointment in the next month which will have to be done with the colposcope.  She will also need a follow-up Pap in 6 months.  Please place in recall for 6 month Pap.  ADRIÁN

## 2018-06-18 ENCOUNTER — OFFICE VISIT (OUTPATIENT)
Dept: OBSTETRICS AND GYNECOLOGY | Facility: CLINIC | Age: 53
End: 2018-06-18

## 2018-06-18 VITALS
BODY MASS INDEX: 126.47 KG/M2 | DIASTOLIC BLOOD PRESSURE: 78 MMHG | WEIGHT: 117.8 LBS | SYSTOLIC BLOOD PRESSURE: 110 MMHG

## 2018-06-18 DIAGNOSIS — N89.0 MILD VAGINAL DYSPLASIA: Primary | ICD-10-CM

## 2018-06-18 PROCEDURE — 57511 CRYOCAUTERY OF CERVIX: CPT | Performed by: OBSTETRICS & GYNECOLOGY

## 2018-06-18 NOTE — PROGRESS NOTES
Subjective    Chief Complaint   Patient presents with   • Follow-up     Cryotherapy      History of Present Illness    Iliana Reyes is a 52 y.o. female who presents for Cryosurgery of vagina for HERNANDO-1 diagnosed by colposcopically directed biopsy.    Obstetric History:  OB History      Para Term  AB Living    2 2 2          SAB TAB Ectopic Molar Multiple Live Births                        Menstrual History:     No LMP recorded (lmp unknown). Patient has had a hysterectomy.       Past Medical History:   Diagnosis Date   • Chest pain    • Dyspepsia    • Gastric ulcer    • GERD (gastroesophageal reflux disease)    • Headache    • Tubular adenoma of colon      Family History   Problem Relation Age of Onset   • Heart disease Mother    • Inflammatory bowel disease Mother    • Cerebral aneurysm Father    • Crohn's disease Brother    • Crohn's disease Cousin    • Crohn's disease Cousin        The following portions of the patient's history were reviewed and updated as appropriate: allergies, current medications and past surgical history.    Review of Systems  neg       Objective   Physical Exam  Cryosurgery of white epithelium visualized with colposcope today performed without problem.  Patient tolerated the procedure well.  /78   Wt 53.4 kg (117 lb 12.8 oz)   LMP  (LMP Unknown) Comment: complete  .47 kg/m²     Assessment/Plan   Iliana was seen today for follow-up.    Diagnoses and all orders for this visit:    Mild vaginal dysplasia        RTO 6 months for repeat Pap.

## 2018-08-13 ENCOUNTER — OFFICE VISIT (OUTPATIENT)
Dept: FAMILY MEDICINE CLINIC | Facility: CLINIC | Age: 53
End: 2018-08-13

## 2018-08-13 VITALS
HEIGHT: 65 IN | DIASTOLIC BLOOD PRESSURE: 64 MMHG | WEIGHT: 118.9 LBS | OXYGEN SATURATION: 96 % | BODY MASS INDEX: 19.81 KG/M2 | SYSTOLIC BLOOD PRESSURE: 100 MMHG | HEART RATE: 75 BPM | TEMPERATURE: 98.1 F

## 2018-08-13 DIAGNOSIS — K21.9 GASTROESOPHAGEAL REFLUX DISEASE WITHOUT ESOPHAGITIS: Primary | ICD-10-CM

## 2018-08-13 DIAGNOSIS — R14.0 ABDOMINAL BLOATING: ICD-10-CM

## 2018-08-13 PROCEDURE — 99213 OFFICE O/P EST LOW 20 MIN: CPT | Performed by: NURSE PRACTITIONER

## 2018-08-13 RX ORDER — FLUTICASONE PROPIONATE 50 MCG
SPRAY, SUSPENSION (ML) NASAL
Refills: 0 | COMMUNITY
Start: 2018-06-30 | End: 2020-01-23

## 2018-08-13 RX ORDER — FAMOTIDINE 20 MG/1
20 TABLET, FILM COATED ORAL 2 TIMES DAILY
COMMUNITY
End: 2018-09-12

## 2018-08-13 RX ORDER — NITROFURANTOIN 25; 75 MG/1; MG/1
CAPSULE ORAL
Refills: 0 | COMMUNITY
Start: 2018-08-09 | End: 2018-12-12

## 2018-08-13 NOTE — PROGRESS NOTES
Subjective   Iliana Reyes is a 52 y.o. female who presents today for:    Gas (Frequent x 4-5 months) and Heartburn    Ms. Reyes presents today because she is having heart burn, gas and bloating. She states that this is an on going problem for at least the past 2 years. She has seen Dr. Lyons in the past for these issues. She taken Dexilant, pantoprazole and Nexium for these issues. She reports that none of these medications helped her. She states her acid reflux has worsened over the past week. She states she has tried to eliminated dairy products to see if that is the cause of her GI issues and she has not seen much of change in how she feels.     I have reviewed the patient's medical history in detail and updated the computerized patient record.       Ms. Reyes  reports that she quit smoking about 4 years ago. She has a 20.00 pack-year smoking history. She has never used smokeless tobacco. She reports that she drinks about 1.2 oz of alcohol per week . She reports that she does not use drugs.     Allergies   Allergen Reactions   • Codeine Hives   • Pantoprazole Nausea And Vomiting       Current Outpatient Prescriptions:   •  betamethasone, augmented, (DIPROLENE) 0.05 % cream, , Disp: , Rfl: 1  •  diphenhydrAMINE (BENADRYL) 25 mg, Take 25 mg by mouth every 6 (six) hours as needed for itching., Disp: , Rfl:   •  estradiol (VIVELLE-DOT) 0.025 MG/24HR patch, Place 1 patch on the skin 2 (Two) Times a Week., Disp: 8 patch, Rfl: 12  •  famotidine (PEPCID) 20 MG tablet, Take 20 mg by mouth 2 (Two) Times a Day., Disp: , Rfl:   •  fluticasone (FLONASE) 50 MCG/ACT nasal spray, , Disp: , Rfl: 0  •  Multiple Vitamins-Minerals (MULTIVITAMIN ADULT PO), Take  by mouth., Disp: , Rfl:   •  nitrofurantoin, macrocrystal-monohydrate, (MACROBID) 100 MG capsule, , Disp: , Rfl: 0      Review of Systems   Constitutional: Negative.    Respiratory: Negative.    Cardiovascular: Negative.    Gastrointestinal: Positive for abdominal  "distention. Negative for abdominal pain, nausea and vomiting.        Gas. Bloating and acid reflux         Objective   Vitals:    08/13/18 1501   BP: 100/64   BP Location: Left arm   Patient Position: Sitting   Cuff Size: Adult   Pulse: 75   Temp: 98.1 °F (36.7 °C)   TempSrc: Oral   SpO2: 96%   Weight: 53.9 kg (118 lb 14.4 oz)   Height: 165.1 cm (65\")     Physical Exam   Constitutional: She is oriented to person, place, and time. She appears well-developed and well-nourished.   Cardiovascular: Normal rate, regular rhythm, normal heart sounds and intact distal pulses.    Pulmonary/Chest: Effort normal and breath sounds normal.   Abdominal: Soft. Bowel sounds are normal. She exhibits no distension. There is no tenderness.   Neurological: She is alert and oriented to person, place, and time.   Psychiatric:   No acute distress   Vitals reviewed.            Iliana was seen today for gas and heartburn.    Diagnoses and all orders for this visit:    Gastroesophageal reflux disease without esophagitis  -     Ambulatory Referral to Gastroenterology  -     H. Pylori Breath Test - Breath, Lung; Future    Abdominal bloating  -     Ambulatory Referral to Gastroenterology    1. I have ordered a H. Pylori breath test to be done later this week. I have also asked her to follow up with Dr. Lyons since he has treated her for this in the past.   2. She is to follow up as needed.   "

## 2018-08-22 LAB — UREA BREATH TEST QL: NEGATIVE

## 2018-09-12 ENCOUNTER — OFFICE VISIT (OUTPATIENT)
Dept: GASTROENTEROLOGY | Facility: CLINIC | Age: 53
End: 2018-09-12

## 2018-09-12 VITALS
TEMPERATURE: 98.2 F | DIASTOLIC BLOOD PRESSURE: 78 MMHG | SYSTOLIC BLOOD PRESSURE: 124 MMHG | BODY MASS INDEX: 19.39 KG/M2 | WEIGHT: 116.4 LBS | HEIGHT: 65 IN

## 2018-09-12 DIAGNOSIS — R14.0 ABDOMINAL BLOATING: ICD-10-CM

## 2018-09-12 DIAGNOSIS — K59.04 CHRONIC IDIOPATHIC CONSTIPATION: ICD-10-CM

## 2018-09-12 DIAGNOSIS — K21.9 GASTROESOPHAGEAL REFLUX DISEASE, ESOPHAGITIS PRESENCE NOT SPECIFIED: Primary | ICD-10-CM

## 2018-09-12 PROCEDURE — 99214 OFFICE O/P EST MOD 30 MIN: CPT | Performed by: NURSE PRACTITIONER

## 2018-09-12 NOTE — PROGRESS NOTES
Chief Complaint   Patient presents with   • Bloated   • Constipation       Iliana Reyes is a  52 y.o. female here for a follow up visit for GERD and constipation.     HPI  51 yo f presents today for follow up visit for GERD and constipation. She is a patient of Dr. Lyons. She was last seen in the office on 11/2016 by OCTAVIO Canseco. At that time she was not doing well on amitiza or pantoprazole. She was given samples of Linzess and Dexilant. She admits both really helped but her insurance wouldn't cover them. Apparently we were working on a PA for both meds but somehow the patient never got the Rxs. Since then she has been miserable with her constipation and GERD. She has been taking Zantac 150 mg daily from [facility] and she admits it doesn't do much for her at all. She still has lots of reflux symptoms. She denies any dysphagia, abd pain, N&V, diarrhea, rectal bleeding or melena. She admits her appetite is ok and her weight is stable.     Past Medical History:   Diagnosis Date   • Leiva esophagus    • Chest pain 2016   • Dyspepsia    • Gastric ulcer    • GERD (gastroesophageal reflux disease)    • Headache    • Tubular adenoma of colon        Past Surgical History:   Procedure Laterality Date   • CHOLECYSTECTOMY      Dr. EDER Alba   • COLONOSCOPY  05/18/2016    polyp, tics, tubular adenoma   • ENDOSCOPY  05/18/2016    gastric ulcer w/clean base, acute gastritis. Leiva's esophagus   • HYSTERECTOMY     • OOPHORECTOMY     • SHOULDER ARTHROSCOPY         Scheduled Meds:    Continuous Infusions:  No current facility-administered medications for this visit.     PRN Meds:.    Allergies   Allergen Reactions   • Codeine Hives   • Pantoprazole Nausea And Vomiting       Social History     Social History   • Marital status:      Spouse name: N/A   • Number of children: N/A   • Years of education: N/A     Occupational History   • Not on file.     Social History Main Topics   • Smoking status: Former Smoker      Packs/day: 1.00     Years: 20.00     Quit date: 2014   • Smokeless tobacco: Never Used   • Alcohol use 1.2 oz/week     2 Glasses of wine per week      Comment: occ   • Drug use: No   • Sexual activity: Yes     Partners: Male     Other Topics Concern   • Not on file     Social History Narrative   • No narrative on file       Family History   Problem Relation Age of Onset   • Heart disease Mother    • Inflammatory bowel disease Mother    • Cerebral aneurysm Father    • Crohn's disease Brother    • Crohn's disease Cousin    • Crohn's disease Cousin        Review of Systems   Constitutional: Negative for appetite change, chills, diaphoresis, fatigue, fever and unexpected weight change.   HENT: Negative for nosebleeds, postnasal drip, sore throat, trouble swallowing and voice change.    Respiratory: Negative for cough, choking, chest tightness, shortness of breath and wheezing.    Cardiovascular: Negative for chest pain.   Gastrointestinal: Positive for abdominal distention and constipation. Negative for abdominal pain, anal bleeding, blood in stool, diarrhea, nausea, rectal pain and vomiting.   Endocrine: Negative for polydipsia, polyphagia and polyuria.   Musculoskeletal: Negative for gait problem.   Skin: Negative for rash and wound.   Allergic/Immunologic: Negative for food allergies.   Neurological: Negative for dizziness, speech difficulty and light-headedness.   Psychiatric/Behavioral: Negative for confusion, self-injury, sleep disturbance and suicidal ideas.       Vitals:    09/12/18 1132   BP: 124/78   Temp: 98.2 °F (36.8 °C)       Physical Exam   Constitutional: She is oriented to person, place, and time. She appears well-developed and well-nourished. She does not appear ill. No distress.   HENT:   Head: Normocephalic.   Eyes: Pupils are equal, round, and reactive to light.   Cardiovascular: Normal rate, regular rhythm and normal heart sounds.    Pulmonary/Chest: Effort normal and breath sounds normal.    Abdominal: Soft. Bowel sounds are normal. She exhibits no distension and no mass. There is no hepatosplenomegaly. There is no tenderness. There is no rebound and no guarding. No hernia.   Musculoskeletal: Normal range of motion.   Neurological: She is alert and oriented to person, place, and time.   Skin: Skin is warm and dry.   Psychiatric: She has a normal mood and affect. Her speech is normal and behavior is normal. Judgment normal.       No images are attached to the encounter.    Assessment & plan     1. Gastroesophageal reflux disease, esophagitis presence not specified    2. Chronic idiopathic constipation    3. Abdominal bloating    GERD is not well controlled. Will give her more samples of Dexilant 60 mg daily. Constipation is not well controlled either. Will give her more samples of Linzess 72. Patient to call in 2 week with update. If she does well on both meds will call in prescription. Follow up with me in 3 months. Call office with any issues.

## 2018-09-26 ENCOUNTER — TELEPHONE (OUTPATIENT)
Dept: GASTROENTEROLOGY | Facility: CLINIC | Age: 53
End: 2018-09-26

## 2018-09-26 NOTE — TELEPHONE ENCOUNTER
----- Message from Iliana Reyes sent at 9/26/2018  2:30 PM EDT -----  Regarding: Complaint  Good Afternoon-    I am following up with you on the sample supply that I have.  The Farooq worked great for about 3 days then after that not so great.  Its now been 3 days with little b/m and the swelling/bloating is awful.  If I recall correctly that's what happen the first time I was given samples of the Farooq.

## 2018-09-26 NOTE — TELEPHONE ENCOUNTER
I would have her double up on the Linzess to start taking the 145 mg dose  And call us in 1 week with update. If that still doesn't get her more regular can increase it to the 290 dose. Then if that doesn't work we can always try TRULANCE if she has not tried it yet. Thanks.

## 2018-09-27 NOTE — TELEPHONE ENCOUNTER
Call from pt.  Advise per M Payton to double up on linzess to start taking the 145 mcg dose.  Call in 1 wk with update.  If that still doesn't get more regular, can increase to 290 mcg.  Then if that doesn't work, will try alternative.  Pt verb understanding.

## 2018-10-16 RX ORDER — DEXLANSOPRAZOLE 60 MG/1
60 CAPSULE, DELAYED RELEASE ORAL
Qty: 90 CAPSULE | Refills: 2 | Status: SHIPPED | OUTPATIENT
Start: 2018-10-16 | End: 2018-12-12

## 2018-10-18 ENCOUNTER — TELEPHONE (OUTPATIENT)
Dept: GASTROENTEROLOGY | Facility: CLINIC | Age: 53
End: 2018-10-18

## 2018-10-18 RX ORDER — LUBIPROSTONE 24 UG/1
24 CAPSULE ORAL 2 TIMES DAILY WITH MEALS
Qty: 60 CAPSULE | Refills: 11 | Status: SHIPPED | OUTPATIENT
Start: 2018-10-18 | End: 2018-12-12

## 2018-10-19 ENCOUNTER — PRIOR AUTHORIZATION (OUTPATIENT)
Dept: GASTROENTEROLOGY | Facility: CLINIC | Age: 53
End: 2018-10-19

## 2018-10-22 NOTE — TELEPHONE ENCOUNTER
Dexilant has been denied by patient's insurance.  Not covered by her insurance.  Letter is in media

## 2018-10-23 NOTE — TELEPHONE ENCOUNTER
Received fax request for additional information.  Our fax is down so I contacted insurance and provided additional clinical information.  Spoke with Sil.

## 2018-10-24 RX ORDER — POLYETHYLENE GLYCOL 3350 17 G/17G
17 POWDER, FOR SOLUTION ORAL DAILY
Qty: 578 G | Refills: 5 | Status: SHIPPED | OUTPATIENT
Start: 2018-10-24 | End: 2019-03-12

## 2018-10-24 RX ORDER — OMEPRAZOLE 40 MG/1
40 CAPSULE, DELAYED RELEASE ORAL
Qty: 30 CAPSULE | Refills: 5 | Status: SHIPPED | OUTPATIENT
Start: 2018-10-24 | End: 2018-12-12

## 2018-10-26 ENCOUNTER — PRIOR AUTHORIZATION (OUTPATIENT)
Dept: GASTROENTEROLOGY | Facility: CLINIC | Age: 53
End: 2018-10-26

## 2018-11-07 ENCOUNTER — OFFICE VISIT (OUTPATIENT)
Dept: OBSTETRICS AND GYNECOLOGY | Facility: CLINIC | Age: 53
End: 2018-11-07

## 2018-11-07 VITALS
BODY MASS INDEX: 20.19 KG/M2 | SYSTOLIC BLOOD PRESSURE: 104 MMHG | DIASTOLIC BLOOD PRESSURE: 64 MMHG | HEIGHT: 65 IN | WEIGHT: 121.2 LBS

## 2018-11-07 DIAGNOSIS — N89.0 MILD VAGINAL DYSPLASIA: Primary | ICD-10-CM

## 2018-11-07 PROCEDURE — 99213 OFFICE O/P EST LOW 20 MIN: CPT | Performed by: OBSTETRICS & GYNECOLOGY

## 2018-11-07 NOTE — PROGRESS NOTES
"Subjective    Chief Complaint   Patient presents with   • Follow-up     fu, repeat pap hx mild dysplasia, hyst w/bso      History of Present Illness    Iliana Reyes is a 52 y.o. 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  Patient had biopsy-proven HERNANDO-1 of the vagina.  She has had a hysterectomy with removal of her cervix as well.    Obstetric History:  OB History      Para Term  AB Living    2 2 2          SAB TAB Ectopic Molar Multiple Live Births                        Menstrual History:     No LMP recorded (lmp unknown). Patient has had a hysterectomy.       Past Medical History:   Diagnosis Date   • Leiva esophagus    • Chest pain    • Dyspepsia    • Gastric ulcer    • GERD (gastroesophageal reflux disease)    • Headache    • Tubular adenoma of colon      Family History   Problem Relation Age of Onset   • Heart disease Mother    • Inflammatory bowel disease Mother    • Cerebral aneurysm Father    • Crohn's disease Brother    • Crohn's disease Cousin    • Crohn's disease Cousin        The following portions of the patient's history were reviewed and updated as appropriate: allergies, current medications, past surgical history and problem list.    Review of Systems  neg       Objective   Physical Exam  Vaginal cuff without lesion.  Pap smear performed.  Bimanual exam negative.  /64   Ht 165.1 cm (65\")   Wt 55 kg (121 lb 3.2 oz)   LMP  (LMP Unknown) Comment: complete  BMI 20.17 kg/m²     Assessment/Plan   Iliana was seen today for follow-up.    Diagnoses and all orders for this visit:    Mild vaginal dysplasia  -     IGP,rfx Aptima HPV All Pth        RTO 4-6 months for AE with Pap     15 minute visit today of which 12-13 minutes was face-to-face counseling going over all the patient's test to date including vaginal biopsies and previous Paps demonstrating no more than mild dysplasia as patient had a question as to whether she had " severe dysplasia which she hasn't.  Are planning only repeat Pap smears as long as no high-grade dysplasia is questioned.

## 2018-11-13 LAB
CONV .: ABNORMAL
CYTOLOGIST CVX/VAG CYTO: ABNORMAL
CYTOLOGY CVX/VAG DOC THIN PREP: ABNORMAL
DX ICD CODE: ABNORMAL
DX ICD CODE: ABNORMAL
HIV 1 & 2 AB SER-IMP: ABNORMAL
HPV I/H RISK 4 DNA CVX QL PROBE+SIG AMP: NEGATIVE
OTHER STN SPEC: ABNORMAL
PATH REPORT.FINAL DX SPEC: ABNORMAL
PATHOLOGIST CVX/VAG CYTO: ABNORMAL
STAT OF ADQ CVX/VAG CYTO-IMP: ABNORMAL

## 2018-12-12 ENCOUNTER — OFFICE VISIT (OUTPATIENT)
Dept: GASTROENTEROLOGY | Facility: CLINIC | Age: 53
End: 2018-12-12

## 2018-12-12 VITALS
BODY MASS INDEX: 19.96 KG/M2 | SYSTOLIC BLOOD PRESSURE: 100 MMHG | WEIGHT: 119.8 LBS | DIASTOLIC BLOOD PRESSURE: 60 MMHG | HEIGHT: 65 IN | TEMPERATURE: 98.2 F

## 2018-12-12 DIAGNOSIS — R14.0 ABDOMINAL BLOATING: ICD-10-CM

## 2018-12-12 DIAGNOSIS — K21.9 GASTROESOPHAGEAL REFLUX DISEASE, ESOPHAGITIS PRESENCE NOT SPECIFIED: Primary | ICD-10-CM

## 2018-12-12 DIAGNOSIS — K59.04 CHRONIC IDIOPATHIC CONSTIPATION: ICD-10-CM

## 2018-12-12 PROCEDURE — 99214 OFFICE O/P EST MOD 30 MIN: CPT | Performed by: NURSE PRACTITIONER

## 2018-12-12 RX ORDER — LANSOPRAZOLE 30 MG/1
30 CAPSULE, DELAYED RELEASE ORAL 2 TIMES DAILY
Qty: 60 CAPSULE | Refills: 5 | Status: SHIPPED | OUTPATIENT
Start: 2018-12-12 | End: 2019-06-24 | Stop reason: ALTCHOICE

## 2018-12-14 ENCOUNTER — PRIOR AUTHORIZATION (OUTPATIENT)
Dept: GASTROENTEROLOGY | Facility: CLINIC | Age: 53
End: 2018-12-14

## 2019-02-19 RX ORDER — ESTRADIOL 0.03 MG/D
1 FILM, EXTENDED RELEASE TRANSDERMAL 2 TIMES WEEKLY
Qty: 8 PATCH | Refills: 12 | Status: SHIPPED | OUTPATIENT
Start: 2019-02-21 | End: 2019-05-10 | Stop reason: SDUPTHER

## 2019-03-12 ENCOUNTER — OFFICE VISIT (OUTPATIENT)
Dept: GASTROENTEROLOGY | Facility: CLINIC | Age: 54
End: 2019-03-12

## 2019-03-12 VITALS
WEIGHT: 119.2 LBS | DIASTOLIC BLOOD PRESSURE: 80 MMHG | TEMPERATURE: 98 F | HEIGHT: 65 IN | BODY MASS INDEX: 19.86 KG/M2 | SYSTOLIC BLOOD PRESSURE: 122 MMHG

## 2019-03-12 DIAGNOSIS — R10.10 PAIN OF UPPER ABDOMEN: ICD-10-CM

## 2019-03-12 DIAGNOSIS — K22.70 BARRETT'S ESOPHAGUS WITHOUT DYSPLASIA: ICD-10-CM

## 2019-03-12 DIAGNOSIS — K59.04 CHRONIC IDIOPATHIC CONSTIPATION: ICD-10-CM

## 2019-03-12 DIAGNOSIS — K21.9 GASTROESOPHAGEAL REFLUX DISEASE, ESOPHAGITIS PRESENCE NOT SPECIFIED: Primary | ICD-10-CM

## 2019-03-12 PROCEDURE — 99214 OFFICE O/P EST MOD 30 MIN: CPT | Performed by: NURSE PRACTITIONER

## 2019-03-12 RX ORDER — ESOMEPRAZOLE MAGNESIUM 40 MG/1
40 CAPSULE, DELAYED RELEASE ORAL 2 TIMES DAILY
Qty: 60 CAPSULE | Refills: 5 | Status: SHIPPED | OUTPATIENT
Start: 2019-03-12 | End: 2019-09-26 | Stop reason: SDUPTHER

## 2019-03-12 NOTE — PROGRESS NOTES
Chief Complaint   Patient presents with   • Follow-up   • Heartburn       Iliana Reyes is a  53 y.o. female here for a follow up visit for GERD.    HPI  54 yo f presents today for follow up visit for GERD and constipation. She is a patient of Dr. Lyons. She was last seen in the office on 12/12/19. She has hx GERD/Leiva's Esophagus/Gastric ulcer and admits the prevacid 30 mg BID is not working well for her like the Dexilant did. But her insurance wouldn't cover the Dexilant. She has tried and failed on omeprazole and Protonix in the past. She has tried eliminating GERD triggering foods and drinks but admits the reflux is still an issue. She also has hx constipation and admits she is doing well with Linzess 290 every other day. She is no longer using the Miralax. She doesn't think she needs it anymore. She denies any dysphagia, abd pain, N&V, diarrhea, rectal bleeding or melena. She admits her appetite is good and her weight is stable.       Past Medical History:   Diagnosis Date   • Leiva esophagus    • Chest pain 2016   • Dyspepsia    • Gastric ulcer    • GERD (gastroesophageal reflux disease)    • Headache    • Tubular adenoma of colon        Past Surgical History:   Procedure Laterality Date   • CHOLECYSTECTOMY      Dr. EDER Alba   • COLONOSCOPY  05/18/2016    polyp, tics, tubular adenoma   • ENDOSCOPY  05/18/2016    gastric ulcer w/clean base, acute gastritis. Leiva's esophagus   • HYSTERECTOMY     • OOPHORECTOMY     • SHOULDER ARTHROSCOPY         Scheduled Meds:    Continuous Infusions:  No current facility-administered medications for this visit.     PRN Meds:.    Allergies   Allergen Reactions   • Amitiza [Lubiprostone] Other (See Comments)     Migraines   • Codeine Hives   • Pantoprazole Nausea And Vomiting       Social History     Socioeconomic History   • Marital status:      Spouse name: Not on file   • Number of children: Not on file   • Years of education: Not on file   • Highest  education level: Not on file   Social Needs   • Financial resource strain: Not on file   • Food insecurity - worry: Not on file   • Food insecurity - inability: Not on file   • Transportation needs - medical: Not on file   • Transportation needs - non-medical: Not on file   Occupational History   • Not on file   Tobacco Use   • Smoking status: Former Smoker     Packs/day: 1.00     Years: 20.00     Pack years: 20.00     Last attempt to quit:      Years since quittin.1   • Smokeless tobacco: Never Used   Substance and Sexual Activity   • Alcohol use: Yes     Alcohol/week: 1.2 oz     Types: 2 Glasses of wine per week     Comment: occ   • Drug use: No   • Sexual activity: Yes     Partners: Male   Other Topics Concern   • Not on file   Social History Narrative   • Not on file       Family History   Problem Relation Age of Onset   • Heart disease Mother    • Inflammatory bowel disease Mother    • Cerebral aneurysm Father    • Crohn's disease Brother    • Crohn's disease Cousin    • Crohn's disease Cousin        Review of Systems   Constitutional: Negative for appetite change, chills, diaphoresis, fatigue, fever and unexpected weight change.   HENT: Negative for nosebleeds, postnasal drip, sore throat, trouble swallowing and voice change.    Respiratory: Negative for cough, choking, chest tightness, shortness of breath and wheezing.    Cardiovascular: Negative for chest pain.   Gastrointestinal: Positive for abdominal distention and abdominal pain. Negative for anal bleeding, blood in stool, constipation, diarrhea, nausea, rectal pain and vomiting.   Endocrine: Negative for polydipsia, polyphagia and polyuria.   Musculoskeletal: Negative for gait problem.   Skin: Negative for rash and wound.   Allergic/Immunologic: Negative for food allergies.   Neurological: Negative for dizziness, speech difficulty and light-headedness.   Psychiatric/Behavioral: Negative for confusion, self-injury, sleep disturbance and suicidal  ideas.       Vitals:    03/12/19 0823   BP: 122/80   Temp: 98 °F (36.7 °C)       Physical Exam   Constitutional: She is oriented to person, place, and time. She appears well-developed and well-nourished. She does not appear ill. No distress.   HENT:   Head: Normocephalic.   Eyes: Pupils are equal, round, and reactive to light.   Cardiovascular: Normal rate, regular rhythm and normal heart sounds.   Pulmonary/Chest: Effort normal and breath sounds normal.   Abdominal: Soft. Bowel sounds are normal. She exhibits no distension and no mass. There is no hepatosplenomegaly. There is no tenderness. There is no rebound and no guarding. No hernia.   Musculoskeletal: Normal range of motion.   Neurological: She is alert and oriented to person, place, and time.   Skin: Skin is warm and dry.   Psychiatric: She has a normal mood and affect. Her speech is normal and behavior is normal. Judgment normal.       No images are attached to the encounter.    Assessment & Plan    1. Gastroesophageal reflux disease, esophagitis presence not specified    2. Pain of upper abdomen    3. Leiva's Esophagus without dysplasia    4. Chronic idiopathic constipation    GERD is not well controlled. Will change the prevacid to Nexium 40 mg BID and see how she does. If her insurance doesn't cover it will keep the prevacid and add some pepcid or Zantac to it. May need to consider repeat EGD with Dr. Lyons for further evaluation. Constipation seems better with Linzess 290 every other day. She is no longer needing the miralax. Call office next week with update. Follow up with me in 3 months. Call office with any issues. Continue GERD precautions.

## 2019-03-18 ENCOUNTER — PRIOR AUTHORIZATION (OUTPATIENT)
Dept: GASTROENTEROLOGY | Facility: CLINIC | Age: 54
End: 2019-03-18

## 2019-04-08 ENCOUNTER — OFFICE VISIT (OUTPATIENT)
Dept: OBSTETRICS AND GYNECOLOGY | Facility: CLINIC | Age: 54
End: 2019-04-08

## 2019-04-08 ENCOUNTER — PROCEDURE VISIT (OUTPATIENT)
Dept: OBSTETRICS AND GYNECOLOGY | Facility: CLINIC | Age: 54
End: 2019-04-08

## 2019-04-08 ENCOUNTER — APPOINTMENT (OUTPATIENT)
Dept: WOMENS IMAGING | Facility: HOSPITAL | Age: 54
End: 2019-04-08

## 2019-04-08 VITALS
SYSTOLIC BLOOD PRESSURE: 112 MMHG | DIASTOLIC BLOOD PRESSURE: 66 MMHG | HEIGHT: 64 IN | BODY MASS INDEX: 20.49 KG/M2 | WEIGHT: 120 LBS

## 2019-04-08 DIAGNOSIS — Z01.419 ENCOUNTER FOR GYNECOLOGICAL EXAMINATION WITHOUT ABNORMAL FINDING: Primary | ICD-10-CM

## 2019-04-08 DIAGNOSIS — R19.5 HEME POSITIVE STOOL: ICD-10-CM

## 2019-04-08 DIAGNOSIS — Z12.31 VISIT FOR SCREENING MAMMOGRAM: Primary | ICD-10-CM

## 2019-04-08 DIAGNOSIS — Z78.0 MENOPAUSE: ICD-10-CM

## 2019-04-08 DIAGNOSIS — N89.0 MILD VAGINAL DYSPLASIA: ICD-10-CM

## 2019-04-08 DIAGNOSIS — Z12.39 SCREENING FOR BREAST CANCER: ICD-10-CM

## 2019-04-08 LAB
DEVELOPER EXPIRATION DATE: ABNORMAL
DEVELOPER LOT NUMBER: ABNORMAL
EXPIRATION DATE: ABNORMAL
FECAL OCCULT BLOOD SCREEN, POC: POSITIVE
Lab: ABNORMAL
NEGATIVE CONTROL: NEGATIVE
POSITIVE CONTROL: POSITIVE

## 2019-04-08 PROCEDURE — G0328 FECAL BLOOD SCRN IMMUNOASSAY: HCPCS | Performed by: OBSTETRICS & GYNECOLOGY

## 2019-04-08 PROCEDURE — 77067 SCR MAMMO BI INCL CAD: CPT | Performed by: RADIOLOGY

## 2019-04-08 PROCEDURE — 99396 PREV VISIT EST AGE 40-64: CPT | Performed by: OBSTETRICS & GYNECOLOGY

## 2019-04-08 PROCEDURE — 77067 SCR MAMMO BI INCL CAD: CPT | Performed by: OBSTETRICS & GYNECOLOGY

## 2019-04-08 RX ORDER — ESTRADIOL 0.03 MG/D
1 FILM, EXTENDED RELEASE TRANSDERMAL 2 TIMES WEEKLY
Qty: 8 PATCH | Refills: 12
Start: 2019-04-08 | End: 2020-01-02

## 2019-04-08 NOTE — PROGRESS NOTES
Subjective    Chief Complaint   Patient presents with   • Gynecologic Exam     ae, hyst w/bso, hx mild dysplasia, still having hot flashes,       History of Present Illness    Iliana Reyes is a 53 y.o. female who presents for annual exam. Mammogram today.  Previous hysterectomy with BSO.  History of biopsy-proven mild dysplasia of the vagina with last Pap ASCUS negative HPV.  Non-smoker.  DEXA scan never done and being scheduled.  Colonoscopy a couple years ago showed a polyp and due again in 3 years.  No other problems.    Obstetric History:  OB History      Para Term  AB Living    2 2 2          SAB TAB Ectopic Molar Multiple Live Births                        Menstrual History:     No LMP recorded (lmp unknown). Patient has had a hysterectomy.       Past Medical History:   Diagnosis Date   • Leiva esophagus    • Chest pain    • Dyspepsia    • Gastric ulcer    • GERD (gastroesophageal reflux disease)    • Headache    • Tubular adenoma of colon      Family History   Problem Relation Age of Onset   • Heart disease Mother    • Inflammatory bowel disease Mother    • Cerebral aneurysm Father    • Crohn's disease Brother    • Crohn's disease Cousin    • Crohn's disease Cousin      Social History     Tobacco Use   Smoking Status Former Smoker   • Packs/day: 1.00   • Years: 20.00   • Pack years: 20.00   • Last attempt to quit:    • Years since quittin.2   Smokeless Tobacco Never Used     Counseling given: Not Answered      The following portions of the patient's history were reviewed and updated as appropriate: allergies, current medications, past family history, past medical history, past social history, past surgical history and problem list.    Review of Systems   Constitutional: Negative.  Negative for fever and unexpected weight change.   HENT: Negative.    Respiratory: Negative for shortness of breath and wheezing.    Cardiovascular: Negative for chest pain, palpitations and leg  "swelling.   Gastrointestinal: Negative for abdominal pain, anal bleeding and blood in stool.   Genitourinary: Negative for dysuria, pelvic pain, urgency, vaginal bleeding, vaginal discharge and vaginal pain.   Skin: Negative.    Neurological: Negative.    Hematological: Negative.  Negative for adenopathy.   Psychiatric/Behavioral: Negative.  Negative for dysphoric mood. The patient is not nervous/anxious.             Objective   Physical Exam   Constitutional: She is oriented to person, place, and time. She appears well-developed and well-nourished.   HENT:   Head: Normocephalic.   Eyes: Pupils are equal, round, and reactive to light.   Neck: No tracheal deviation present. No thyromegaly present.   Cardiovascular: Normal rate, regular rhythm and normal heart sounds.   No murmur heard.  Pulmonary/Chest: Effort normal and breath sounds normal. No respiratory distress.   Breasts without masses, tenderness or nipple discharge   Abdominal: Soft. She exhibits no mass. There is no hepatosplenomegaly. There is no tenderness. No hernia.   Genitourinary: Vagina normal. Rectal exam shows guaiac positive stool. No vaginal discharge found.   Genitourinary Comments: External genitalia normal   Lymphadenopathy:     She has no cervical adenopathy.     She has no axillary adenopathy.        Right: No inguinal adenopathy present.        Left: No inguinal adenopathy present.   Neurological: She is alert and oriented to person, place, and time.   Skin: Skin is warm and dry. No rash noted.   Psychiatric: She has a normal mood and affect. Her behavior is normal.   Vitals reviewed.      /66   Ht 162.6 cm (64\")   Wt 54.4 kg (120 lb)   LMP  (LMP Unknown) Comment: complete  BMI 20.60 kg/m²     Assessment/Plan   Iliana was seen today for gynecologic exam.    Diagnoses and all orders for this visit:    Encounter for gynecological examination without abnormal finding  -     IGP,rfx Aptima HPV All Pth  -     POC Occult Blood " Stool    Mild vaginal dysplasia    Screening for breast cancer    Menopause    Heme positive stool    Other orders  -     estradiol (VIVELLE-DOT) 0.025 MG/24HR patch; Place 1 patch on the skin as directed by provider 2 (Two) Times a Week.        RTO 6 months for repeat pap.   DEXA scan being scheduled.    Counseled about taking calcium with vitamin D twice daily.  Discussed repeat Pap in 6 months.     Since Hemoccult barely positive and may be from contamination at exam, patient will have it repeated by me in 2 weeks when she has her DEXA scan.  She is due for a colonoscopy around June anyway.

## 2019-04-10 LAB
CONV .: NORMAL
CYTOLOGIST CVX/VAG CYTO: NORMAL
CYTOLOGY CVX/VAG DOC THIN PREP: NORMAL
DX ICD CODE: NORMAL
HIV 1 & 2 AB SER-IMP: NORMAL
OTHER STN SPEC: NORMAL
PATH REPORT.FINAL DX SPEC: NORMAL
STAT OF ADQ CVX/VAG CYTO-IMP: NORMAL

## 2019-05-06 ENCOUNTER — PROCEDURE VISIT (OUTPATIENT)
Dept: OBSTETRICS AND GYNECOLOGY | Facility: CLINIC | Age: 54
End: 2019-05-06

## 2019-05-06 ENCOUNTER — OFFICE VISIT (OUTPATIENT)
Dept: OBSTETRICS AND GYNECOLOGY | Facility: CLINIC | Age: 54
End: 2019-05-06

## 2019-05-06 VITALS
HEIGHT: 64 IN | DIASTOLIC BLOOD PRESSURE: 66 MMHG | WEIGHT: 121.2 LBS | BODY MASS INDEX: 20.69 KG/M2 | SYSTOLIC BLOOD PRESSURE: 112 MMHG

## 2019-05-06 DIAGNOSIS — N95.1 MENOPAUSAL SYMPTOM: ICD-10-CM

## 2019-05-06 DIAGNOSIS — R19.5 HEME POSITIVE STOOL: Primary | ICD-10-CM

## 2019-05-06 DIAGNOSIS — Z00.00 PREVENTATIVE HEALTH CARE: Primary | ICD-10-CM

## 2019-05-06 DIAGNOSIS — M85.88 OSTEOPENIA OF SPINE: ICD-10-CM

## 2019-05-06 LAB
DEVELOPER EXPIRATION DATE: NORMAL
DEVELOPER LOT NUMBER: NORMAL
EXPIRATION DATE: NORMAL
FECAL OCCULT BLOOD SCREEN, POC: NEGATIVE
Lab: NORMAL
NEGATIVE CONTROL: NEGATIVE
POSITIVE CONTROL: POSITIVE

## 2019-05-06 PROCEDURE — G0328 FECAL BLOOD SCRN IMMUNOASSAY: HCPCS | Performed by: OBSTETRICS & GYNECOLOGY

## 2019-05-06 PROCEDURE — 77080 DXA BONE DENSITY AXIAL: CPT | Performed by: OBSTETRICS & GYNECOLOGY

## 2019-05-06 PROCEDURE — 99213 OFFICE O/P EST LOW 20 MIN: CPT | Performed by: OBSTETRICS & GYNECOLOGY

## 2019-05-10 ENCOUNTER — OFFICE VISIT (OUTPATIENT)
Dept: GASTROENTEROLOGY | Facility: CLINIC | Age: 54
End: 2019-05-10

## 2019-05-10 VITALS
HEIGHT: 64 IN | BODY MASS INDEX: 20.45 KG/M2 | SYSTOLIC BLOOD PRESSURE: 106 MMHG | DIASTOLIC BLOOD PRESSURE: 66 MMHG | WEIGHT: 119.8 LBS | TEMPERATURE: 98.2 F

## 2019-05-10 DIAGNOSIS — K59.04 CHRONIC IDIOPATHIC CONSTIPATION: ICD-10-CM

## 2019-05-10 DIAGNOSIS — R10.10 PAIN OF UPPER ABDOMEN: Primary | ICD-10-CM

## 2019-05-10 DIAGNOSIS — K63.5 POLYP OF COLON, UNSPECIFIED PART OF COLON, UNSPECIFIED TYPE: ICD-10-CM

## 2019-05-10 DIAGNOSIS — R68.81 EARLY SATIETY: ICD-10-CM

## 2019-05-10 DIAGNOSIS — R19.5 HEME + STOOL: ICD-10-CM

## 2019-05-10 DIAGNOSIS — K21.9 GASTROESOPHAGEAL REFLUX DISEASE, ESOPHAGITIS PRESENCE NOT SPECIFIED: ICD-10-CM

## 2019-05-10 PROCEDURE — 99214 OFFICE O/P EST MOD 30 MIN: CPT | Performed by: NURSE PRACTITIONER

## 2019-05-10 NOTE — PROGRESS NOTES
Chief Complaint   Patient presents with   • Follow-up   • Rectal Bleeding     HPI    Iliana Reyes is a  53 y.o. female here for a follow up visit for rectal bleeding.  This is an established patient of Dr. Lyons's, new to me.  She has a history of GERD, peptic ulcer disease, and constipation.    On visit today patient complains of pain in the upper abdomen comes and goes is a  long-standing issue.  Daily reflux, early satiety, occasional nausea but no vomiting.  Appetite comes and goes.  Excessive bloating mainly in the evening.  She reports poor control of GERD for quite some time now.  She is on Nexium 40 mg twice daily taken 30 minutes prior to eating.  She avoids NSAIDs.    She takes Linzess 290 mcg every other day.  Every other day she will have one explosive bowel movement.  Denies rectal bleeding.  Denies lower abdominal pain.  Patient does report she recently had her annual physical and was found to have heme positive stools.  No recent labs.    I reviewed EGD from 2016 performed for epigastric pain and dyspepsia with normal esophagus, gastric ulcer with clean base, acute gastritis. Pathology was negative for H. pylori, hemic ulcer with intestinal metaplasia and features consistent with associated ulceration or erosion.  No dysplasia.    I reviewed colonoscopy from 2016 with polyp, diverticulosis.  Pathology positive tubular adenoma recall colonoscopy 5 years for screening purposes.    Tried and failed omeprazole, Protonix. Dexilant worked the best wore off over time.    Past Medical History:   Diagnosis Date   • Leiva esophagus    • Chest pain 2016   • Dyspepsia    • Gastric ulcer    • GERD (gastroesophageal reflux disease)    • Headache    • Irritable bowel syndrome    • Tubular adenoma of colon        Past Surgical History:   Procedure Laterality Date   • CHOLECYSTECTOMY      Dr. EDER Alba   • COLONOSCOPY  05/18/2016    polyp, tics, tubular adenoma   • ENDOSCOPY  05/18/2016    gastric ulcer w/clean  base, acute gastritis. Leiva's esophagus   • HYSTERECTOMY     • OOPHORECTOMY     • SHOULDER ARTHROSCOPY     • UPPER GASTROINTESTINAL ENDOSCOPY  05/18/2016       Scheduled Meds:  Outpatient Encounter Medications as of 5/10/2019   Medication Sig Dispense Refill   • betamethasone, augmented, (DIPROLENE) 0.05 % cream   1   • diphenhydrAMINE (BENADRYL) 25 mg Take 25 mg by mouth every 6 (six) hours as needed for itching.     • estradiol (VIVELLE-DOT) 0.025 MG/24HR patch Place 1 patch on the skin as directed by provider 2 (Two) Times a Week. 8 patch 12   • fluticasone (FLONASE) 50 MCG/ACT nasal spray   0   • lansoprazole (PREVACID) 30 MG capsule Take 1 capsule by mouth 2 (Two) Times a Day. 60 capsule 5   • linaclotide (LINZESS) 290 MCG capsule capsule Take 1 capsule by mouth Every Morning Before Breakfast. (Patient taking differently: Take 290 mcg by mouth Every Other Day.) 30 capsule 5   • Multiple Vitamins-Minerals (MULTIVITAMIN ADULT PO) Take  by mouth.     • esomeprazole (nexIUM) 40 MG capsule Take 1 capsule by mouth 2 (Two) Times a Day. 60 capsule 5   • [DISCONTINUED] estradiol (VIVELLE-DOT) 0.025 MG/24HR patch Place 1 patch on the skin as directed by provider 2 (Two) Times a Week. 8 patch 12     No facility-administered encounter medications on file as of 5/10/2019.        Continuous Infusions:  No current facility-administered medications for this visit.     PRN Meds:.    Allergies   Allergen Reactions   • Amitiza [Lubiprostone] Other (See Comments)     Migraines   • Codeine Hives   • Pantoprazole Nausea And Vomiting       Social History     Socioeconomic History   • Marital status:      Spouse name: Not on file   • Number of children: Not on file   • Years of education: Not on file   • Highest education level: Not on file   Tobacco Use   • Smoking status: Former Smoker     Packs/day: 0.00     Years: 20.00     Pack years: 0.00     Start date: 1/1/2007     Last attempt to quit: 2014     Years since quitting:  5.3   • Smokeless tobacco: Never Used   Substance and Sexual Activity   • Alcohol use: Yes     Alcohol/week: 1.2 oz     Types: 2 Glasses of wine per week     Comment: occ   • Drug use: No   • Sexual activity: Yes     Partners: Male     Birth control/protection: None       Family History   Problem Relation Age of Onset   • Heart disease Mother    • Inflammatory bowel disease Mother    • Colon cancer Mother    • Colon polyps Mother    • Liver cancer Mother    • Ulcerative colitis Mother    • Cerebral aneurysm Father    • Crohn's disease Brother    • Colon polyps Brother    • Crohn's disease Cousin    • Crohn's disease Cousin        Review of Systems   Constitutional: Negative for activity change, appetite change, fatigue, fever and unexpected weight change.   HENT: Negative for trouble swallowing.    Respiratory: Negative for apnea, cough, choking, chest tightness, shortness of breath and wheezing.    Cardiovascular: Negative for chest pain, palpitations and leg swelling.   Gastrointestinal: Positive for abdominal pain, constipation and nausea. Negative for abdominal distention, anal bleeding, blood in stool, diarrhea, rectal pain and vomiting.       Vitals:    05/10/19 1417   BP: 106/66   Temp: 98.2 °F (36.8 °C)       Physical Exam   Constitutional: She is oriented to person, place, and time. She appears well-developed and well-nourished.   Eyes: Pupils are equal, round, and reactive to light.   Cardiovascular: Normal rate, regular rhythm and normal heart sounds.   Pulmonary/Chest: Effort normal and breath sounds normal. No respiratory distress. She has no wheezes.   Abdominal: Soft. Bowel sounds are normal. She exhibits no distension and no mass. There is no tenderness. There is no guarding. No hernia.   Musculoskeletal: Normal range of motion.   Neurological: She is alert and oriented to person, place, and time.   Skin: Skin is warm and dry. Capillary refill takes less than 2 seconds.   Psychiatric: She has a  normal mood and affect. Her behavior is normal.       No images are attached to the encounter.    Iliana was seen today for follow-up and rectal bleeding.    Diagnoses and all orders for this visit:    Pain of upper abdomen  -     CBC & Differential  -     Comprehensive Metabolic Panel  -     Lipase  -     Case Request; Standing  -     Obtain Informed Consent; Standing  -     Case Request    Heme + stool  -     Case Request; Standing  -     Obtain Informed Consent; Standing  -     Case Request    Gastroesophageal reflux disease, esophagitis presence not specified  -     Case Request; Standing  -     Obtain Informed Consent; Standing  -     Case Request    Chronic idiopathic constipation  -     Case Request; Standing  -     Obtain Informed Consent; Standing  -     Case Request    Polyp of colon, unspecified part of colon, unspecified type      Impression:    This is a pleasant 53-year-old female seen today in follow-up with complaints of pain in the upper abdomen, breakthrough GERD symptoms daily, nausea, and early satiety.  Patient on twice daily dosing PPI therapy.  I wonder if she could have some element of delayed gastric emptying.  As such we will schedule her for gastric emptying study to rule out gastroparesis.    The patient has been found to have stools positive for occult blood loss. There is no active, visible bleeding at this time. I will schedule the patient for bidirectional endoscopy to evaluate for sources of blood loss such as ulceration, AVM, inflammation, or mass.     I did offer patient trial of reduced dose Linzess 145 mcg given complaints of explosive bowel movement on the higher dose. Samples provided.     CBC, CMP, and lipase today.    Follow-up and further recommendations based on the aforementioned work-up.

## 2019-05-11 LAB
ALBUMIN SERPL-MCNC: 4.3 G/DL (ref 3.5–5.2)
ALBUMIN/GLOB SERPL: 1.7 G/DL
ALP SERPL-CCNC: 70 U/L (ref 39–117)
ALT SERPL-CCNC: 18 U/L (ref 1–33)
AST SERPL-CCNC: 23 U/L (ref 1–32)
BASOPHILS # BLD AUTO: 0.02 10*3/MM3 (ref 0–0.2)
BASOPHILS NFR BLD AUTO: 0.3 % (ref 0–1.5)
BILIRUB SERPL-MCNC: 0.4 MG/DL (ref 0.2–1.2)
BUN SERPL-MCNC: 12 MG/DL (ref 6–20)
BUN/CREAT SERPL: 26.7 (ref 7–25)
CALCIUM SERPL-MCNC: 9.8 MG/DL (ref 8.6–10.5)
CHLORIDE SERPL-SCNC: 100 MMOL/L (ref 98–107)
CO2 SERPL-SCNC: 29.3 MMOL/L (ref 22–29)
CREAT SERPL-MCNC: 0.45 MG/DL (ref 0.57–1)
EOSINOPHIL # BLD AUTO: 0.09 10*3/MM3 (ref 0–0.4)
EOSINOPHIL NFR BLD AUTO: 1.6 % (ref 0.3–6.2)
ERYTHROCYTE [DISTWIDTH] IN BLOOD BY AUTOMATED COUNT: 11.9 % (ref 12.3–15.4)
GLOBULIN SER CALC-MCNC: 2.5 GM/DL
GLUCOSE SERPL-MCNC: 93 MG/DL (ref 65–99)
HCT VFR BLD AUTO: 40.9 % (ref 34–46.6)
HGB BLD-MCNC: 12.6 G/DL (ref 12–15.9)
IMM GRANULOCYTES # BLD AUTO: 0.01 10*3/MM3 (ref 0–0.05)
IMM GRANULOCYTES NFR BLD AUTO: 0.2 % (ref 0–0.5)
LIPASE SERPL-CCNC: 50 U/L (ref 13–60)
LYMPHOCYTES # BLD AUTO: 2.24 10*3/MM3 (ref 0.7–3.1)
LYMPHOCYTES NFR BLD AUTO: 39 % (ref 19.6–45.3)
MCH RBC QN AUTO: 29.9 PG (ref 26.6–33)
MCHC RBC AUTO-ENTMCNC: 30.8 G/DL (ref 31.5–35.7)
MCV RBC AUTO: 96.9 FL (ref 79–97)
MONOCYTES # BLD AUTO: 0.62 10*3/MM3 (ref 0.1–0.9)
MONOCYTES NFR BLD AUTO: 10.8 % (ref 5–12)
NEUTROPHILS # BLD AUTO: 2.77 10*3/MM3 (ref 1.7–7)
NEUTROPHILS NFR BLD AUTO: 48.1 % (ref 42.7–76)
NRBC BLD AUTO-RTO: 0 /100 WBC (ref 0–0.2)
PLATELET # BLD AUTO: 233 10*3/MM3 (ref 140–450)
POTASSIUM SERPL-SCNC: 4.3 MMOL/L (ref 3.5–5.2)
PROT SERPL-MCNC: 6.8 G/DL (ref 6–8.5)
RBC # BLD AUTO: 4.22 10*6/MM3 (ref 3.77–5.28)
SODIUM SERPL-SCNC: 139 MMOL/L (ref 136–145)
WBC # BLD AUTO: 5.75 10*3/MM3 (ref 3.4–10.8)

## 2019-05-17 ENCOUNTER — TELEPHONE (OUTPATIENT)
Dept: GASTROENTEROLOGY | Facility: CLINIC | Age: 54
End: 2019-05-17

## 2019-05-17 NOTE — TELEPHONE ENCOUNTER
----- Message from Annika Villatoro sent at 5/17/2019 12:40 PM EDT -----  Regarding[place]  Linzess 145 [facility] [place]: [phone]  PT NEED AN RX AND MED IS WORKING FOR HER..     [facility] [phone]

## 2019-05-21 ENCOUNTER — HOSPITAL ENCOUNTER (OUTPATIENT)
Dept: NUCLEAR MEDICINE | Facility: HOSPITAL | Age: 54
Discharge: HOME | End: 2019-05-21

## 2019-05-21 ENCOUNTER — TELEPHONE (OUTPATIENT)
Dept: GASTROENTEROLOGY | Facility: CLINIC | Age: 54
End: 2019-05-21

## 2019-05-21 DIAGNOSIS — R10.10 PAIN OF UPPER ABDOMEN: ICD-10-CM

## 2019-05-21 DIAGNOSIS — R68.81 EARLY SATIETY: ICD-10-CM

## 2019-05-21 PROCEDURE — 0 TECHNETIUM SULFUR COLLOID: Performed by: NURSE PRACTITIONER

## 2019-05-21 PROCEDURE — A9541 TC99M SULFUR COLLOID: HCPCS | Performed by: NURSE PRACTITIONER

## 2019-05-21 PROCEDURE — 78264 GASTRIC EMPTYING IMG STUDY: CPT

## 2019-05-21 RX ADMIN — TECHNETIUM TC 99M SULFUR COLLOID 1 DOSE: KIT at 07:15

## 2019-05-21 NOTE — TELEPHONE ENCOUNTER
----- Message from OCTAVIO Nicolas sent at 5/13/2019 12:56 PM EDT -----  Please notify patient that lab work is essentially normal.  Nothing worrisome.

## 2019-05-22 ENCOUNTER — PRIOR AUTHORIZATION (OUTPATIENT)
Dept: GASTROENTEROLOGY | Facility: CLINIC | Age: 54
End: 2019-05-22

## 2019-05-22 NOTE — TELEPHONE ENCOUNTER
PA for Linzess 145mcg once daily dosing sent to [facility] with decision pending  Approved, sent to pharmacy

## 2019-05-28 ENCOUNTER — TELEPHONE (OUTPATIENT)
Dept: GASTROENTEROLOGY | Facility: CLINIC | Age: 54
End: 2019-05-28

## 2019-05-28 NOTE — TELEPHONE ENCOUNTER
Called pt and on identitifed vm advised per Mayelin SALCEDO that her ges is normal and to call with any questions.

## 2019-05-28 NOTE — TELEPHONE ENCOUNTER
----- Message from OCTAVIO Nicolas sent at 5/21/2019  3:04 PM EDT -----  Please notify patient that gastric emptying study is normal.

## 2019-06-05 ENCOUNTER — OUTSIDE FACILITY SERVICE (OUTPATIENT)
Dept: GASTROENTEROLOGY | Facility: CLINIC | Age: 54
End: 2019-06-05

## 2019-06-05 ENCOUNTER — TELEPHONE (OUTPATIENT)
Dept: GASTROENTEROLOGY | Facility: CLINIC | Age: 54
End: 2019-06-05

## 2019-06-05 DIAGNOSIS — R14.0 ABDOMINAL BLOATING: ICD-10-CM

## 2019-06-05 DIAGNOSIS — R10.13 EPIGASTRIC PAIN: Primary | ICD-10-CM

## 2019-06-05 DIAGNOSIS — R68.81 EARLY SATIETY: ICD-10-CM

## 2019-06-05 DIAGNOSIS — R11.0 NAUSEA: ICD-10-CM

## 2019-06-05 PROCEDURE — 43239 EGD BIOPSY SINGLE/MULTIPLE: CPT | Performed by: INTERNAL MEDICINE

## 2019-06-05 PROCEDURE — 45380 COLONOSCOPY AND BIOPSY: CPT | Performed by: INTERNAL MEDICINE

## 2019-06-05 RX ORDER — SUCRALFATE 1 G/1
TABLET ORAL
Qty: 60 TABLET | Refills: 2 | Status: SHIPPED | OUTPATIENT
Start: 2019-06-05 | End: 2020-01-23

## 2019-06-05 NOTE — TELEPHONE ENCOUNTER
Med e-scribed.    [facility] order placed. Central scheduling will call to arrange.

## 2019-06-05 NOTE — TELEPHONE ENCOUNTER
----- Message from Cachorro Lyons MD sent at 6/5/2019 12:59 PM EDT -----  Regarding: rx and test  please call in carafte 1 gram po bid #[phone] rf  Schedule MRCP with contrast  thx

## 2019-06-11 NOTE — PROGRESS NOTES
Tubular adenoma colon polyp  Colonoscopy recall 5 years  Await MRCP results  Continue all medications  Office visit with Mayelin 2 weeks after MRIs to be performed

## 2019-06-20 ENCOUNTER — HOSPITAL ENCOUNTER (OUTPATIENT)
Dept: MRI IMAGING | Facility: HOSPITAL | Age: 54
Discharge: HOME | End: 2019-06-20
Admitting: INTERNAL MEDICINE

## 2019-06-20 DIAGNOSIS — R68.81 EARLY SATIETY: ICD-10-CM

## 2019-06-20 DIAGNOSIS — R14.0 ABDOMINAL BLOATING: ICD-10-CM

## 2019-06-20 DIAGNOSIS — R11.0 NAUSEA: ICD-10-CM

## 2019-06-20 DIAGNOSIS — R10.13 EPIGASTRIC PAIN: ICD-10-CM

## 2019-06-20 PROCEDURE — 74183 MRI ABD W/O CNTR FLWD CNTR: CPT

## 2019-06-20 PROCEDURE — 0 GADOBENATE DIMEGLUMINE 529 MG/ML SOLUTION: Performed by: INTERNAL MEDICINE

## 2019-06-20 PROCEDURE — 82565 ASSAY OF CREATININE: CPT

## 2019-06-20 PROCEDURE — A9577 INJ MULTIHANCE: HCPCS | Performed by: INTERNAL MEDICINE

## 2019-06-20 RX ADMIN — GADOBENATE DIMEGLUMINE 10 ML: 529 INJECTION, SOLUTION INTRAVENOUS at 08:03

## 2019-06-21 LAB — CREAT BLDA-MCNC: 0.5 MG/DL (ref 0.6–1.3)

## 2019-06-24 ENCOUNTER — OFFICE VISIT (OUTPATIENT)
Dept: GASTROENTEROLOGY | Facility: CLINIC | Age: 54
End: 2019-06-24

## 2019-06-24 VITALS
TEMPERATURE: 98.2 F | SYSTOLIC BLOOD PRESSURE: 114 MMHG | WEIGHT: 118.8 LBS | DIASTOLIC BLOOD PRESSURE: 76 MMHG | BODY MASS INDEX: 20.28 KG/M2 | HEIGHT: 64 IN

## 2019-06-24 DIAGNOSIS — K63.5 POLYP OF COLON, UNSPECIFIED PART OF COLON, UNSPECIFIED TYPE: ICD-10-CM

## 2019-06-24 DIAGNOSIS — K59.04 CHRONIC IDIOPATHIC CONSTIPATION: ICD-10-CM

## 2019-06-24 DIAGNOSIS — R14.0 BLOATING: Primary | ICD-10-CM

## 2019-06-24 DIAGNOSIS — K21.9 GASTROESOPHAGEAL REFLUX DISEASE, ESOPHAGITIS PRESENCE NOT SPECIFIED: ICD-10-CM

## 2019-06-24 PROCEDURE — 99214 OFFICE O/P EST MOD 30 MIN: CPT | Performed by: NURSE PRACTITIONER

## 2019-06-24 NOTE — PROGRESS NOTES
Chief Complaint   Patient presents with   • Follow-up     post scopes/MRI   • Abdominal Pain     HPI    Iliana Reyes is a  53 y.o. female here for a follow up visit for abdominal pain.  This is an established patient of Dr. Menendez.  She is a history of GERD, peptic ulcer disease, constipation.  She was last seen in the office in May which time she complained of upper abdominal pain, dyspeptic symptoms, and rectal bleeding.  Subsequent gastric empty study was normal.  Normal labs.    She underwent bidirectional endoscopic evaluation 6/5/2019 which revealed:     EGD --normal esophagus, normal duodenum, acute gastritis.  Recommendation PPI twice daily and, Carafate twice daily.    C/s --polyp, diverticulosis, nonbleeding internal hemorrhoids.  Repeat colonoscopy in 5 years for surveillance purposes. Path + TA.    Pathology was negative for sprue, negative for H. pylori, chronic focally active gastritis.    Subsequent MRCP 6/20/19 with no acute intra-abdominal abnormality, status post cholecystectomy.  Probable simple renal cysts bilaterally.    On visit today she still struggling with belching, bloating as well as constipation however slightly improved.  She continues on twice daily dosing PPI.  Denies nausea or vomiting.  Appetite is good.  Weight is stable.  She is only taking Carafate once a day as she feels like this is worsening her chronic constipation.    Her bowels are moving every 4 to 5 days on 145 mcg Linzess.  Reduce from 290 due to complaints of explosive bowel movements.    Tried and failed omeprazole, Protonix. Dexilant worked the best wore off over time.  Past Medical History:   Diagnosis Date   • Leiva esophagus    • Chest pain 2016   • Dyspepsia    • Gastric ulcer    • GERD (gastroesophageal reflux disease)    • Headache    • Irritable bowel syndrome    • Tubular adenoma of colon        Past Surgical History:   Procedure Laterality Date   • CHOLECYSTECTOMY      Dr. EDER Alba   • COLONOSCOPY   05/18/2016    polyp, tics, tubular adenoma   • COLONOSCOPY  06/05/2019    normal ileum, diverticulosis, NBIH, TAx1   • ENDOSCOPY  05/18/2016    gastric ulcer w/clean base, acute gastritis. Leiva's esophagus   • ENDOSCOPY  06/05/2019    normal esophagus/duodenum, acute gastritis   • HYSTERECTOMY     • OOPHORECTOMY     • SHOULDER ARTHROSCOPY     • UPPER GASTROINTESTINAL ENDOSCOPY  05/18/2016       Scheduled Meds:  Outpatient Encounter Medications as of 6/24/2019   Medication Sig Dispense Refill   • betamethasone, augmented, (DIPROLENE) 0.05 % cream   1   • diphenhydrAMINE (BENADRYL) 25 mg Take 25 mg by mouth every 6 (six) hours as needed for itching.     • esomeprazole (nexIUM) 40 MG capsule Take 1 capsule by mouth 2 (Two) Times a Day. 60 capsule 5   • estradiol (VIVELLE-DOT) 0.025 MG/24HR patch Place 1 patch on the skin as directed by provider 2 (Two) Times a Week. 8 patch 12   • linaclotide (LINZESS) 145 MCG capsule capsule Take 1 capsule by mouth Every Morning Before Breakfast. 30 capsule 5   • Multiple Vitamins-Minerals (MULTIVITAMIN ADULT PO) Take  by mouth.     • sucralfate (CARAFATE) 1 g tablet Dissolve 1 tablet in 1 tablespoon warm water until it becomes a slurry, then drink. Do this two times per day, before meals. 60 tablet 2   • fluticasone (FLONASE) 50 MCG/ACT nasal spray   0   • linaclotide (LINZESS) 290 MCG capsule capsule Take 1 capsule by mouth Every Morning Before Breakfast. (Patient taking differently: Take 290 mcg by mouth Every Other Day.) 30 capsule 5   • [DISCONTINUED] lansoprazole (PREVACID) 30 MG capsule Take 1 capsule by mouth 2 (Two) Times a Day. 60 capsule 5     No facility-administered encounter medications on file as of 6/24/2019.        Continuous Infusions:  No current facility-administered medications for this visit.     PRN Meds:.    Allergies   Allergen Reactions   • Amitiza [Lubiprostone] Other (See Comments)     Migraines   • Codeine Hives   • Pantoprazole Nausea And Vomiting        Social History     Socioeconomic History   • Marital status:      Spouse name: Not on file   • Number of children: Not on file   • Years of education: Not on file   • Highest education level: Not on file   Tobacco Use   • Smoking status: Former Smoker     Packs/day: 0.00     Years: 20.00     Pack years: 0.00     Start date: 2007     Last attempt to quit: 2014     Years since quittin.4   • Smokeless tobacco: Never Used   Substance and Sexual Activity   • Alcohol use: Yes     Alcohol/week: 1.2 oz     Types: 2 Glasses of wine per week     Comment: occ   • Drug use: No   • Sexual activity: Yes     Partners: Male     Birth control/protection: None       Family History   Problem Relation Age of Onset   • Heart disease Mother    • Inflammatory bowel disease Mother    • Colon cancer Mother    • Colon polyps Mother    • Liver cancer Mother    • Ulcerative colitis Mother    • Cerebral aneurysm Father    • Crohn's disease Brother    • Colon polyps Brother    • Crohn's disease Cousin    • Crohn's disease Cousin        Review of Systems   Constitutional: Negative for activity change, appetite change, fatigue, fever and unexpected weight change.   Respiratory: Negative for apnea, cough, choking, chest tightness, shortness of breath and wheezing.    Cardiovascular: Negative for chest pain, palpitations and leg swelling.   Gastrointestinal: Positive for constipation. Negative for abdominal distention, abdominal pain, anal bleeding, blood in stool, diarrhea, nausea, rectal pain and vomiting.        + Bloating and belching       Vitals:    19 1347   BP: 114/76   Temp: 98.2 °F (36.8 °C)       Physical Exam   Constitutional: She is oriented to person, place, and time. She appears well-developed and well-nourished.   Eyes: Pupils are equal, round, and reactive to light.   Cardiovascular: Normal rate, regular rhythm and normal heart sounds.   Pulmonary/Chest: Effort normal and breath sounds normal. No  respiratory distress. She has no wheezes.   Abdominal: Soft. Bowel sounds are normal. She exhibits no distension and no mass. There is no tenderness. There is no guarding. No hernia.   Musculoskeletal: Normal range of motion.   Neurological: She is alert and oriented to person, place, and time.   Skin: Skin is warm and dry. Capillary refill takes less than 2 seconds.   Psychiatric: She has a normal mood and affect. Her behavior is normal.       No images are attached to the encounter.    Iliana was seen today for follow-up and abdominal pain.    Diagnoses and all orders for this visit:    Bloating    Chronic idiopathic constipation    Gastroesophageal reflux disease, esophagitis presence not specified    Polyp of colon, unspecified part of colon, unspecified type    Impression:    This is a pleasant 53-year-old female seen today in follow-up with recent work-up including gastric emptying study, EGD, colonoscopy, and MRCP.  She is slightly improved with twice daily dosing PPI but still struggling with chronic constipation, belching and bloating.  We reviewed the aforementioned work-up as well as pathology.     At this point continue twice daily dosing PPI.  Perhaps if we can get her constipation better controled we can increase dosage of Carafate, for now once a day.  We discussed GERD diet lifestyle modifications and I provided her with educational handout.  Avoid NSAIDs.    For chronic constipation trial of Motegrity, 7 days worth of samples provided. We talked about treatment for constipation including increased physical activity, adequate rest, adequate hydration with 6-8 glasses of water per day, and high fiber diet.     Return to clinic in 6 weeks.

## 2019-06-25 ENCOUNTER — TELEPHONE (OUTPATIENT)
Dept: GASTROENTEROLOGY | Facility: CLINIC | Age: 54
End: 2019-06-25

## 2019-06-25 NOTE — TELEPHONE ENCOUNTER
Pt seen in the office by Mayelin 6/24/19.  These results were discussed.     Health Maintenance updated to reflect C/S due 6/2024.

## 2019-06-25 NOTE — TELEPHONE ENCOUNTER
----- Message from Cachorro Lyons MD sent at 6/11/2019 12:32 PM EDT -----  Tubular adenoma colon polyp  Colonoscopy recall 5 years  Await MRCP results  Continue all medications  Office visit with Mayelin 2 weeks after MRIs to be performed

## 2019-08-12 ENCOUNTER — OFFICE VISIT (OUTPATIENT)
Dept: GASTROENTEROLOGY | Facility: CLINIC | Age: 54
End: 2019-08-12

## 2019-08-12 VITALS
HEIGHT: 65 IN | TEMPERATURE: 98.3 F | DIASTOLIC BLOOD PRESSURE: 70 MMHG | WEIGHT: 116.4 LBS | SYSTOLIC BLOOD PRESSURE: 116 MMHG | BODY MASS INDEX: 19.39 KG/M2

## 2019-08-12 DIAGNOSIS — K21.9 GASTROESOPHAGEAL REFLUX DISEASE, ESOPHAGITIS PRESENCE NOT SPECIFIED: ICD-10-CM

## 2019-08-12 DIAGNOSIS — K59.04 CHRONIC IDIOPATHIC CONSTIPATION: ICD-10-CM

## 2019-08-12 DIAGNOSIS — R10.13 DYSPEPSIA: Primary | ICD-10-CM

## 2019-08-12 PROCEDURE — 99214 OFFICE O/P EST MOD 30 MIN: CPT | Performed by: NURSE PRACTITIONER

## 2019-08-12 NOTE — PROGRESS NOTES
Chief Complaint   Patient presents with   • Dyspepsia   • Constipation     HPI    Iliana Reyes is a  53 y.o. female here for a follow up visit for dyspepsia and constipation.     This is an established patient of Dr. Menendez.  She has a history of GERD, peptic ulcer disease, constipation.  She was last seen in the office in June for complaints of dyspepsia and constipation.  At that visit recommended that she continue twice a day dosing PPI in combination with once daily Carafate.  Patient was offered trial of Motegrity.     On visit today patient does admit that she did not try Motegrity but went back on Linzess 145 mcg once daily, increase hydration and physical activity.  Currently bowels are moving every day with feelings of complete evacuation.  No abdominal pain or rectal bleeding.    Patient still struggling with daily indigestion.  Patient avoids known triggers.  Still occasionally taking NSAIDs for sinus headache.  Currently on Carafate once a day in combination of Nexium twice daily.  Dexilant worked the best in the past but gradually wore off over time.  Patient is interested in revisiting Dexilant as she has been off this medication for a number of years.  Denies nausea, vomiting, or dysphagia.  Appetite is good.  Weight is stable.    Previous work-up:    Recent gastric imaging study was normal.    She underwent bidirectional endoscopic evaluation 6/5/2019 which revealed:      EGD --normal esophagus, normal duodenum, acute gastritis.  Recommendation PPI twice daily and, Carafate twice daily.     C/s --polyp, diverticulosis, nonbleeding internal hemorrhoids.  Repeat colonoscopy in 5 years for surveillance purposes. Path + TA.     Pathology was negative for sprue, negative for H. pylori, chronic focally active gastritis.     Subsequent MRCP 6/20/19 with no acute intra-abdominal abnormality, status post cholecystectomy.  Probable simple renal cysts bilaterally.    Tried and failed omeprazole, Protonix.  Dexilant worked the best wore off over time.    Past Medical History:   Diagnosis Date   • Leiva esophagus    • Chest pain 2016   • Dyspepsia    • Gastric ulcer    • GERD (gastroesophageal reflux disease)    • Headache    • Irritable bowel syndrome    • Tubular adenoma of colon        Past Surgical History:   Procedure Laterality Date   • CHOLECYSTECTOMY      Dr. EDER Alba   • COLONOSCOPY  05/18/2016    polyp, tics, tubular adenoma   • COLONOSCOPY  06/05/2019    normal ileum, diverticulosis, NBIH, TAx1   • ENDOSCOPY  05/18/2016    gastric ulcer w/clean base, acute gastritis. Leiva's esophagus   • ENDOSCOPY  06/05/2019    normal esophagus/duodenum, acute gastritis   • HYSTERECTOMY     • OOPHORECTOMY     • SHOULDER ARTHROSCOPY     • UPPER GASTROINTESTINAL ENDOSCOPY  05/18/2016       Scheduled Meds:  Outpatient Encounter Medications as of 8/12/2019   Medication Sig Dispense Refill   • betamethasone, augmented, (DIPROLENE) 0.05 % cream   1   • esomeprazole (nexIUM) 40 MG capsule Take 1 capsule by mouth 2 (Two) Times a Day. 60 capsule 5   • estradiol (VIVELLE-DOT) 0.025 MG/24HR patch Place 1 patch on the skin as directed by provider 2 (Two) Times a Week. 8 patch 12   • linaclotide (LINZESS) 145 MCG capsule capsule Take 1 capsule by mouth Every Morning Before Breakfast. 30 capsule 5   • Multiple Vitamins-Minerals (MULTIVITAMIN ADULT PO) Take  by mouth.     • sucralfate (CARAFATE) 1 g tablet Dissolve 1 tablet in 1 tablespoon warm water until it becomes a slurry, then drink. Do this two times per day, before meals. 60 tablet 2   • diphenhydrAMINE (BENADRYL) 25 mg Take 25 mg by mouth every 6 (six) hours as needed for itching.     • fluticasone (FLONASE) 50 MCG/ACT nasal spray   0   • linaclotide (LINZESS) 290 MCG capsule capsule Take 1 capsule by mouth Every Morning Before Breakfast. (Patient taking differently: Take 290 mcg by mouth Every Other Day.) 30 capsule 5     No facility-administered encounter medications on  file as of 2019.        Continuous Infusions:  No current facility-administered medications for this visit.     PRN Meds:.    Allergies   Allergen Reactions   • Amitiza [Lubiprostone] Other (See Comments)     Migraines   • Codeine Hives   • Pantoprazole Nausea And Vomiting       Social History     Socioeconomic History   • Marital status:      Spouse name: Not on file   • Number of children: Not on file   • Years of education: Not on file   • Highest education level: Not on file   Tobacco Use   • Smoking status: Former Smoker     Packs/day: 0.00     Years: 20.00     Pack years: 0.00     Start date: 2007     Last attempt to quit:      Years since quittin.6   • Smokeless tobacco: Never Used   Substance and Sexual Activity   • Alcohol use: Yes     Alcohol/week: 1.2 oz     Types: 2 Glasses of wine per week     Comment: occ   • Drug use: No   • Sexual activity: Yes     Partners: Male     Birth control/protection: None       Family History   Problem Relation Age of Onset   • Heart disease Mother    • Inflammatory bowel disease Mother    • Colon cancer Mother    • Colon polyps Mother    • Liver cancer Mother    • Ulcerative colitis Mother    • Cerebral aneurysm Father    • Crohn's disease Brother    • Colon polyps Brother    • Crohn's disease Cousin    • Crohn's disease Cousin        Review of Systems   Constitutional: Negative for activity change, appetite change, fatigue, fever and unexpected weight change.   HENT: Negative for trouble swallowing.    Respiratory: Negative for apnea, cough, choking, chest tightness, shortness of breath and wheezing.    Cardiovascular: Negative for chest pain, palpitations and leg swelling.   Gastrointestinal: Positive for constipation. Negative for abdominal distention, abdominal pain, anal bleeding, blood in stool, diarrhea, nausea, rectal pain and vomiting.        + Indigestion/dyspepsia       Vitals:    19 0825   BP: 116/70   Temp: 98.3 °F (36.8 °C)        Physical Exam   Constitutional: She is oriented to person, place, and time. She appears well-developed and well-nourished.   Eyes: Pupils are equal, round, and reactive to light.   Cardiovascular: Normal rate, regular rhythm and normal heart sounds.   Pulmonary/Chest: Effort normal and breath sounds normal. No respiratory distress. She has no wheezes.   Abdominal: Soft. Bowel sounds are normal. She exhibits no distension and no mass. There is no tenderness. There is no guarding. No hernia.   Musculoskeletal: Normal range of motion.   Neurological: She is alert and oriented to person, place, and time.   Skin: Skin is warm and dry. Capillary refill takes less than 2 seconds.   Psychiatric: She has a normal mood and affect. Her behavior is normal.       No images are attached to the encounter.    Iliana was seen today for dyspepsia and constipation.    Diagnoses and all orders for this visit:    Dyspepsia    Chronic idiopathic constipation    Gastroesophageal reflux disease, esophagitis presence not specified    Impression:    Pleasant 53-year-old female who we have been following for dyspepsia, GERD, and constipation.    Constipation currently well maintained on daily Linzess.  Again we talked about treatment for constipation including increased physical activity, adequate rest, adequate hydration with 6-8 glasses of water per day, and high fiber diet.     Still struggling with daily dyspepsia/indigestion somewhat improved since last office visit.  Patient interested in revisiting Dexilant as this is helped in the past.  Offered trial of Dexilant for 10 days.  Again reinforced GERD diet lifestyle modifications.  Trial of FD guard 2 tablets twice daily, samples provided.  Stop Carafate as patient reports little change in symptoms on this medication.  Instructed the patient to avoid NSAIDs.    Patient to call with an update in 2 weeks.  Return to clinic 3 months or sooner if needed.

## 2019-09-26 RX ORDER — LINACLOTIDE 145 UG/1
CAPSULE, GELATIN COATED ORAL
Qty: 90 CAPSULE | Refills: 1 | Status: SHIPPED | OUTPATIENT
Start: 2019-09-26 | End: 2019-11-12 | Stop reason: SDUPTHER

## 2019-09-27 RX ORDER — ESOMEPRAZOLE MAGNESIUM 40 MG/1
CAPSULE, DELAYED RELEASE ORAL
Qty: 180 CAPSULE | Refills: 1 | Status: SHIPPED | OUTPATIENT
Start: 2019-09-27 | End: 2019-11-12

## 2019-11-12 ENCOUNTER — OFFICE VISIT (OUTPATIENT)
Dept: GASTROENTEROLOGY | Facility: CLINIC | Age: 54
End: 2019-11-12

## 2019-11-12 ENCOUNTER — DOCUMENTATION (OUTPATIENT)
Dept: GASTROENTEROLOGY | Facility: CLINIC | Age: 54
End: 2019-11-12

## 2019-11-12 VITALS
HEIGHT: 65 IN | TEMPERATURE: 98.4 F | BODY MASS INDEX: 19.89 KG/M2 | SYSTOLIC BLOOD PRESSURE: 104 MMHG | DIASTOLIC BLOOD PRESSURE: 70 MMHG | WEIGHT: 119.4 LBS

## 2019-11-12 DIAGNOSIS — R14.0 BLOATING: ICD-10-CM

## 2019-11-12 DIAGNOSIS — K59.04 CHRONIC IDIOPATHIC CONSTIPATION: ICD-10-CM

## 2019-11-12 DIAGNOSIS — R10.13 DYSPEPSIA: Primary | ICD-10-CM

## 2019-11-12 DIAGNOSIS — K21.9 GASTROESOPHAGEAL REFLUX DISEASE, ESOPHAGITIS PRESENCE NOT SPECIFIED: ICD-10-CM

## 2019-11-12 PROCEDURE — 99214 OFFICE O/P EST MOD 30 MIN: CPT | Performed by: NURSE PRACTITIONER

## 2019-11-12 RX ORDER — LANSOPRAZOLE 30 MG/1
30 CAPSULE, DELAYED RELEASE ORAL 2 TIMES DAILY
Qty: 60 CAPSULE | Refills: 5 | Status: SHIPPED | OUTPATIENT
Start: 2019-11-12 | End: 2020-04-22

## 2019-11-12 RX ORDER — CHLORAL HYDRATE 500 MG
1000 CAPSULE ORAL
COMMUNITY

## 2019-11-12 NOTE — PROGRESS NOTES
Chief Complaint   Patient presents with   • Dyspepsia       Iliana Reyes is a  53 y.o. female here for a follow up visit for dyspepsia and constipation.    On visit today she reports excessive belching and acid reflux despite twice daily dosing PPI therapy.  She has been on Nexium 40 mg for almost a year and a half.  She felt the best on Dexilant previously but this wore off.  She tried and failed multiple other PPIs.  No nausea, vomiting, or dysphagia.  Appetite is good.  Her weight is stable.  She reports excessive gas and bloating.  She did not tolerate FD guard made belching worse.    Bowels are moving daily with soft stools.  Denies constipation, diarrhea, rectal bleeding.  She continues on Linzess 145 mcg taken once daily.  She tells me that this medication will likely not be covered in the new year.    Previous work-up:     GES 5/19 normal.  EGD 6/19 acute gastritis.  C-scope 6/19 polyp, diverticulosis, NBIH.  Recall 5 years.   MRCP 6//19 with no acute intra-abdominal abnormality.     Past Medical History:   Diagnosis Date   • Leiva esophagus    • Chest pain 2016   • Dyspepsia    • Gastric ulcer    • GERD (gastroesophageal reflux disease)    • Headache    • Irritable bowel syndrome    • Tubular adenoma of colon        Past Surgical History:   Procedure Laterality Date   • CHOLECYSTECTOMY      Dr. EDER Alba   • COLONOSCOPY  05/18/2016    polyp, tics, tubular adenoma   • COLONOSCOPY  06/05/2019    normal ileum, diverticulosis, NBIH, TAx1   • ENDOSCOPY  05/18/2016    gastric ulcer w/clean base, acute gastritis. Leiva's esophagus   • ENDOSCOPY  06/05/2019    normal esophagus/duodenum, acute gastritis   • HYSTERECTOMY     • OOPHORECTOMY     • SHOULDER ARTHROSCOPY     • UPPER GASTROINTESTINAL ENDOSCOPY  05/18/2016       Scheduled Meds:  Outpatient Encounter Medications as of 11/12/2019   Medication Sig Dispense Refill   • betamethasone, augmented, (DIPROLENE) 0.05 % cream   1   • diphenhydrAMINE (BENADRYL) 25  mg Take 25 mg by mouth every 6 (six) hours as needed for itching.     • estradiol (VIVELLE-DOT) 0.025 MG/24HR patch Place 1 patch on the skin as directed by provider 2 (Two) Times a Week. 8 patch 12   • fluticasone (FLONASE) 50 MCG/ACT nasal spray   0   • linaclotide (LINZESS) 145 MCG capsule capsule Take 1 capsule by mouth Every Morning Before Breakfast. 90 capsule 3   • Multiple Vitamins-Minerals (MULTIVITAMIN ADULT PO) Take  by mouth.     • Omega-3 Fatty Acids (FISH OIL) 1000 MG capsule capsule Take  by mouth Daily With Breakfast.     • [DISCONTINUED] esomeprazole (nexIUM) 40 MG capsule TAKE 1 CAPSULE BY MOUTH TWICE DAILY 180 capsule 1   • [DISCONTINUED] LINZESS 145 MCG capsule capsule TAKE 1 CAPSULE BY MOUTH EVERY MORNING BEFORE BREAKFAST 90 capsule 1   • lansoprazole (PREVACID) 30 MG capsule Take 1 capsule by mouth 2 (Two) Times a Day. 60 capsule 5   • sucralfate (CARAFATE) 1 g tablet Dissolve 1 tablet in 1 tablespoon warm water until it becomes a slurry, then drink. Do this two times per day, before meals. 60 tablet 2     No facility-administered encounter medications on file as of 2019.        Continuous Infusions:  No current facility-administered medications for this visit.     PRN Meds:.    Allergies   Allergen Reactions   • Amitiza [Lubiprostone] Other (See Comments)     Migraines   • Codeine Hives   • Pantoprazole Nausea And Vomiting       Social History     Socioeconomic History   • Marital status:      Spouse name: Not on file   • Number of children: Not on file   • Years of education: Not on file   • Highest education level: Not on file   Tobacco Use   • Smoking status: Former Smoker     Packs/day: 0.00     Years: 20.00     Pack years: 0.00     Start date: 2007     Last attempt to quit: 2014     Years since quittin.8   • Smokeless tobacco: Never Used   Substance and Sexual Activity   • Alcohol use: Yes     Alcohol/week: 1.2 oz     Types: 2 Glasses of wine per week     Comment: occ    • Drug use: No   • Sexual activity: Yes     Partners: Male     Birth control/protection: None       Family History   Problem Relation Age of Onset   • Heart disease Mother    • Inflammatory bowel disease Mother    • Colon cancer Mother    • Colon polyps Mother    • Liver cancer Mother    • Ulcerative colitis Mother    • Cerebral aneurysm Father    • Crohn's disease Brother    • Colon polyps Brother    • Crohn's disease Cousin    • Crohn's disease Cousin        Review of Systems   Constitutional: Negative for activity change, appetite change, fatigue, fever and unexpected weight change.   HENT: Negative for trouble swallowing.    Respiratory: Negative for apnea, cough, choking, chest tightness, shortness of breath and wheezing.    Cardiovascular: Negative for chest pain, palpitations and leg swelling.   Gastrointestinal: Negative for abdominal distention, abdominal pain, anal bleeding, blood in stool, constipation, diarrhea, nausea, rectal pain and vomiting.        + Dyspepsia, belching, bloating and gas       Vitals:    11/12/19 0829   BP: 104/70   Temp: 98.4 °F (36.9 °C)       Physical Exam   Constitutional: She is oriented to person, place, and time. She appears well-developed and well-nourished.   Eyes: Pupils are equal, round, and reactive to light.   Cardiovascular: Normal rate, regular rhythm and normal heart sounds.   Pulmonary/Chest: Effort normal and breath sounds normal. No respiratory distress. She has no wheezes.   Abdominal: Soft. Bowel sounds are normal. She exhibits no distension and no mass. There is no tenderness. There is no guarding. No hernia.   Musculoskeletal: Normal range of motion.   Neurological: She is alert and oriented to person, place, and time.   Skin: Skin is warm and dry. Capillary refill takes less than 2 seconds.   Psychiatric: She has a normal mood and affect. Her behavior is normal.       No images are attached to the encounter.    Iliana was seen today for  dyspepsia.    Diagnoses and all orders for this visit:    Dyspepsia    Gastroesophageal reflux disease, esophagitis presence not specified    Chronic idiopathic constipation    Bloating    Other orders  -     lansoprazole (PREVACID) 30 MG capsule; Take 1 capsule by mouth 2 (Two) Times a Day.  -     linaclotide (LINZESS) 145 MCG capsule capsule; Take 1 capsule by mouth Every Morning Before Breakfast.    Assessment/plan    Continued issues with dyspepsia despite twice daily dosing PPI therapy.  Recommend alternative PPI with lansoprazole 30 mg taken twice daily.  Reinforced GERD diet lifestyle modifications.    Regarding gas, bloating, and belching, recommend breath test to rule out small intestinal bacterial overgrowth.    Stable constipation, continue Linzess.  90-day prescription sent to her pharmacy.  According to her insurance plan [employer] will be preferred in the new year.    Return to clinic 6 weeks.

## 2019-12-19 ENCOUNTER — TELEPHONE (OUTPATIENT)
Dept: GASTROENTEROLOGY | Facility: CLINIC | Age: 54
End: 2019-12-19

## 2019-12-19 NOTE — TELEPHONE ENCOUNTER
----- Message from OCTAVIO Nicolas sent at 12/12/2019  1:29 PM EST -----  Please notify patient that breath test does not show evidence of bacterial overgrowth.

## 2019-12-23 ENCOUNTER — OFFICE VISIT (OUTPATIENT)
Dept: GASTROENTEROLOGY | Facility: CLINIC | Age: 54
End: 2019-12-23

## 2019-12-23 VITALS
HEIGHT: 65 IN | BODY MASS INDEX: 19.96 KG/M2 | TEMPERATURE: 98 F | WEIGHT: 119.8 LBS | DIASTOLIC BLOOD PRESSURE: 78 MMHG | SYSTOLIC BLOOD PRESSURE: 122 MMHG

## 2019-12-23 DIAGNOSIS — R10.13 DYSPEPSIA: Primary | ICD-10-CM

## 2019-12-23 DIAGNOSIS — K59.04 CHRONIC IDIOPATHIC CONSTIPATION: ICD-10-CM

## 2019-12-23 PROCEDURE — 99214 OFFICE O/P EST MOD 30 MIN: CPT | Performed by: NURSE PRACTITIONER

## 2019-12-23 NOTE — PROGRESS NOTES
Chief Complaint   Patient presents with   • Heartburn   • Bloated     HPI    Iliana Reyes is a  54 y.o. female here for a follow up visit for dyspepsia.  This patient follows with Dr. Lyons known to me.  She was seen in the office in November for complaints of dyspepsia despite twice daily dosing PPI therapy.  She felt the best on Dexilant previously but lost efficacy over time.  She did not tolerate FD guard made her belching worse.  She was transitioned to lansoprazole twice daily on last office visit but stopped this medication because it she felt like it made her constipation worse. Recent SIBO breath test was negative.     Today she is having significant issues with dyspepsia due to the fact that she is not on any sort of PPI therapy.  She stopped taking lansoprazole and her constipation resolved.  Currently no issues with nausea, vomiting, or dysphagia.  Some mild epigastric discomfort described as a bloating/gas sensation.    Constipation has returned to baseline currently having a bowel movement every 1 to 2 days on Linzess.  Denies rectal bleeding.    This patient does not have her gallbladder.    (Tried: FD guard felt worse, lansoprazole worsened chronic constipation, Nexium wore off.)    Previous work-up:     GES 5/19 normal.  EGD 6/19 acute gastritis.  C-scope 6/19 polyp, diverticulosis, NBIH.  Recall 5 years.   MRCP 6//19 with no acute intra-abdominal abnormality.     Past Medical History:   Diagnosis Date   • Leiva esophagus    • Chest pain 2016   • Dyspepsia    • Gastric ulcer    • GERD (gastroesophageal reflux disease)    • Headache    • Irritable bowel syndrome    • Tubular adenoma of colon        Past Surgical History:   Procedure Laterality Date   • CHOLECYSTECTOMY      Dr. EDER Alba   • COLONOSCOPY  05/18/2016    polyp, tics, tubular adenoma   • COLONOSCOPY  06/05/2019    normal ileum, diverticulosis, NBIH, TAx1   • ENDOSCOPY  05/18/2016    gastric ulcer w/clean base, acute gastritis.  Leiva's esophagus   • ENDOSCOPY  06/05/2019    normal esophagus/duodenum, acute gastritis   • HYSTERECTOMY     • OOPHORECTOMY     • SHOULDER ARTHROSCOPY     • UPPER GASTROINTESTINAL ENDOSCOPY  05/18/2016       Scheduled Meds:  Outpatient Encounter Medications as of 12/23/2019   Medication Sig Dispense Refill   • betamethasone, augmented, (DIPROLENE) 0.05 % cream   1   • diphenhydrAMINE (BENADRYL) 25 mg Take 25 mg by mouth every 6 (six) hours as needed for itching.     • estradiol (VIVELLE-DOT) 0.025 MG/24HR patch Place 1 patch on the skin as directed by provider 2 (Two) Times a Week. 8 patch 12   • linaclotide (LINZESS) 145 MCG capsule capsule Take 1 capsule by mouth Every Morning Before Breakfast. 90 capsule 3   • Multiple Vitamins-Minerals (MULTIVITAMIN ADULT PO) Take  by mouth.     • Omega-3 Fatty Acids (FISH OIL) 1000 MG capsule capsule Take  by mouth Daily With Breakfast.     • fluticasone (FLONASE) 50 MCG/ACT nasal spray   0   • lansoprazole (PREVACID) 30 MG capsule Take 1 capsule by mouth 2 (Two) Times a Day. 60 capsule 5   • sucralfate (CARAFATE) 1 g tablet Dissolve 1 tablet in 1 tablespoon warm water until it becomes a slurry, then drink. Do this two times per day, before meals. 60 tablet 2     No facility-administered encounter medications on file as of 12/23/2019.        Continuous Infusions:  No current facility-administered medications for this visit.     PRN Meds:.    Allergies   Allergen Reactions   • Amitiza [Lubiprostone] Other (See Comments)     Migraines   • Codeine Hives   • Pantoprazole Nausea And Vomiting       Social History     Socioeconomic History   • Marital status:      Spouse name: Not on file   • Number of children: Not on file   • Years of education: Not on file   • Highest education level: Not on file   Tobacco Use   • Smoking status: Former Smoker     Packs/day: 0.00     Years: 20.00     Pack years: 0.00     Start date: 1/1/2007     Last attempt to quit: 2014     Years since  quittin.9   • Smokeless tobacco: Never Used   Substance and Sexual Activity   • Alcohol use: Yes     Alcohol/week: 2.0 standard drinks     Types: 2 Glasses of wine per week     Comment: occ   • Drug use: No   • Sexual activity: Yes     Partners: Male     Birth control/protection: None       Family History   Problem Relation Age of Onset   • Heart disease Mother    • Inflammatory bowel disease Mother    • Colon cancer Mother    • Colon polyps Mother    • Liver cancer Mother    • Ulcerative colitis Mother    • Cerebral aneurysm Father    • Crohn's disease Brother    • Colon polyps Brother    • Crohn's disease Cousin    • Crohn's disease Cousin        Review of Systems   Constitutional: Negative for activity change, appetite change, fatigue, fever and unexpected weight change.   HENT: Negative for trouble swallowing.    Respiratory: Negative for apnea, cough, choking, chest tightness, shortness of breath and wheezing.    Cardiovascular: Negative for chest pain, palpitations and leg swelling.   Gastrointestinal: Negative for abdominal distention, abdominal pain, anal bleeding, blood in stool, constipation, diarrhea, nausea, rectal pain and vomiting.        + Dyspepsia       Vitals:    19 0855   BP: 122/78   Temp: 98 °F (36.7 °C)       Physical Exam   Constitutional: She is oriented to person, place, and time. She appears well-developed and well-nourished.   Eyes: Pupils are equal, round, and reactive to light.   Cardiovascular: Normal rate, regular rhythm and normal heart sounds.   Pulmonary/Chest: Effort normal and breath sounds normal. No respiratory distress. She has no wheezes.   Abdominal: Soft. Bowel sounds are normal. She exhibits no distension and no mass. There is no tenderness. There is no guarding. No hernia.   Musculoskeletal: Normal range of motion.   Neurological: She is alert and oriented to person, place, and time.   Skin: Skin is warm and dry. Capillary refill takes less than 2 seconds.    Psychiatric: She has a normal mood and affect. Her behavior is normal.       No images are attached to the encounter.    Iliana was seen today for heartburn and bloated.    Diagnoses and all orders for this visit:    Dyspepsia    Chronic idiopathic constipation    Assessment/plan    Pleasant 54-year-old female seen today in follow-up status post trial of alternative PPI therapy worsened chronic constipation.  Patient has tried and failed numerous PPIs.  She have excellent results with Dexilant years ago but gradually lost efficacy.  Patient is agreeable to revisiting Dexilant to see if we can improve her dyspeptic symptoms.  I provided her with 10 days of samples.  She is to call us with update of symptoms in a week.  I counseled her on GERD diet lifestyle modifications.  Want her to continue to avoid NSAIDs.    Stable chronic idiopathic constipation on Linzess.  Continue current dosage.    Return to clinic 3 months or sooner if needed.

## 2020-01-02 RX ORDER — ESTRADIOL 0.03 MG/D
FILM, EXTENDED RELEASE TRANSDERMAL
Qty: 8 PATCH | Refills: 12 | Status: SHIPPED | OUTPATIENT
Start: 2020-01-02 | End: 2021-04-05

## 2020-01-23 ENCOUNTER — OFFICE VISIT (OUTPATIENT)
Dept: INTERNAL MEDICINE | Facility: CLINIC | Age: 55
End: 2020-01-23

## 2020-01-23 VITALS
SYSTOLIC BLOOD PRESSURE: 104 MMHG | RESPIRATION RATE: 16 BRPM | HEART RATE: 70 BPM | HEIGHT: 65 IN | WEIGHT: 119.4 LBS | BODY MASS INDEX: 19.89 KG/M2 | DIASTOLIC BLOOD PRESSURE: 70 MMHG

## 2020-01-23 DIAGNOSIS — Z00.00 ANNUAL PHYSICAL EXAM: Primary | ICD-10-CM

## 2020-01-23 DIAGNOSIS — J30.9 ALLERGIC RHINITIS, UNSPECIFIED SEASONALITY, UNSPECIFIED TRIGGER: ICD-10-CM

## 2020-01-23 DIAGNOSIS — Z23 NEED FOR DIPHTHERIA-TETANUS-PERTUSSIS (TDAP) VACCINE: ICD-10-CM

## 2020-01-23 DIAGNOSIS — M85.88 OSTEOPENIA OF SPINE: ICD-10-CM

## 2020-01-23 DIAGNOSIS — Z00.00 HEALTH CARE MAINTENANCE: ICD-10-CM

## 2020-01-23 LAB
ALBUMIN SERPL-MCNC: 4.7 G/DL (ref 3.5–5.2)
ALBUMIN/GLOB SERPL: 2.2 G/DL
ALP SERPL-CCNC: 70 U/L (ref 39–117)
ALT SERPL-CCNC: 8 U/L (ref 1–33)
AST SERPL-CCNC: 19 U/L (ref 1–32)
BILIRUB SERPL-MCNC: 0.7 MG/DL (ref 0.2–1.2)
BUN SERPL-MCNC: 8 MG/DL (ref 6–20)
BUN/CREAT SERPL: 13.3 (ref 7–25)
CALCIUM SERPL-MCNC: 9.4 MG/DL (ref 8.6–10.5)
CHLORIDE SERPL-SCNC: 98 MMOL/L (ref 98–107)
CHOLEST SERPL-MCNC: 173 MG/DL (ref 0–200)
CHOLEST/HDLC SERPL: 2.31 {RATIO}
CO2 SERPL-SCNC: 28.2 MMOL/L (ref 22–29)
CREAT SERPL-MCNC: 0.6 MG/DL (ref 0.57–1)
GLOBULIN SER CALC-MCNC: 2.1 GM/DL
GLUCOSE SERPL-MCNC: 83 MG/DL (ref 65–99)
HDLC SERPL-MCNC: 75 MG/DL (ref 40–60)
LDLC SERPL CALC-MCNC: 87 MG/DL (ref 0–100)
POTASSIUM SERPL-SCNC: 4.5 MMOL/L (ref 3.5–5.2)
PROT SERPL-MCNC: 6.8 G/DL (ref 6–8.5)
SODIUM SERPL-SCNC: 140 MMOL/L (ref 136–145)
TRIGL SERPL-MCNC: 56 MG/DL (ref 0–150)
VLDLC SERPL CALC-MCNC: 11.2 MG/DL

## 2020-01-23 PROCEDURE — 99386 PREV VISIT NEW AGE 40-64: CPT | Performed by: NURSE PRACTITIONER

## 2020-01-23 PROCEDURE — 90715 TDAP VACCINE 7 YRS/> IM: CPT | Performed by: NURSE PRACTITIONER

## 2020-01-23 PROCEDURE — 90471 IMMUNIZATION ADMIN: CPT | Performed by: NURSE PRACTITIONER

## 2020-01-23 RX ORDER — MELATONIN
1000 DAILY
COMMUNITY

## 2020-01-23 NOTE — PROGRESS NOTES
Subjective   Chief Complaint   Patient presents with   • Annual Exam     Np-est care        History of Present Illness     55 yo wf presents as a new patient to establish care and have a physical. Former patient of Dr. Reyes. She is feeling well but continues to have issues with heartburn for which she is followed by Mayelin Gautam and Dr. Lyons. Has bidirectional endoscopy every 3 years.     She has chronic dry eyes since LASIX for which she uses fish oil. Unsure who she sees for this.     P/P with Dr. Carvajal. Had ASCUS in the past as well as colposcopy. Complete hysterectomy with Dr. Hutton. Will see Dr. Carvajal soon for pelvic exam as well as MMG and DXA. Advised to use VD and calcium due to osteopenia which she has not done. Does not exercise.      Reports history of discoid or cutaneous lupus--she is unsure but is managed by Dr. Teixeira with Diprolene cream.      Notes ear pain. Reports frequent sinus infections for which she typically goes to UC. Used Flonase in the past intermittently    Patient Active Problem List   Diagnosis   • ASCUS with positive high risk HPV cervical       Allergies   Allergen Reactions   • Amitiza [Lubiprostone] Other (See Comments)     Migraines   • Codeine Hives   • Pantoprazole Nausea And Vomiting       Current Outpatient Medications on File Prior to Visit   Medication Sig Dispense Refill   • Calcium-Magnesium-Vitamin D (CALCIUM 500 PO) Take  by mouth 2 (Two) Times a Day.     • cholecalciferol (VITAMIN D3) 25 MCG (1000 UT) tablet Take 1,000 Units by mouth Daily.     • diphenhydrAMINE (BENADRYL) 25 mg Take 25 mg by mouth every 6 (six) hours as needed for itching.     • estradiol (VIVELLE-DOT) 0.025 MG/24HR patch PLACE 1 PATCH ONTO THE SKIN AS DIRECTED BY PROVIDER TWICE WEEKLY 8 patch 12   • lansoprazole (PREVACID) 30 MG capsule Take 1 capsule by mouth 2 (Two) Times a Day. 60 capsule 5   • linaclotide (LINZESS) 145 MCG capsule capsule Take 1 capsule by mouth Every Morning Before  Breakfast. 90 capsule 3   • Multiple Vitamins-Minerals (MULTIVITAMIN ADULT PO) Take  by mouth.     • Omega-3 Fatty Acids (FISH OIL) 1000 MG capsule capsule Take  by mouth Daily With Breakfast.     • betamethasone, augmented, (DIPROLENE) 0.05 % cream   1   • [DISCONTINUED] fluticasone (FLONASE) 50 MCG/ACT nasal spray   0   • [DISCONTINUED] sucralfate (CARAFATE) 1 g tablet Dissolve 1 tablet in 1 tablespoon warm water until it becomes a slurry, then drink. Do this two times per day, before meals. 60 tablet 2     No current facility-administered medications on file prior to visit.        Past Medical History:   Diagnosis Date   • Leiva esophagus    • Chest pain    • Dyspepsia    • Gastric ulcer    • GERD (gastroesophageal reflux disease)    • Headache    • Irritable bowel syndrome    • Tubular adenoma of colon        Family History   Problem Relation Age of Onset   • Heart disease Mother    • Inflammatory bowel disease Mother    • Colon cancer Mother    • Colon polyps Mother    • Liver cancer Mother    • Ulcerative colitis Mother    • Cerebral aneurysm Father    • Crohn's disease Brother    • Colon polyps Brother    • Crohn's disease Cousin    • Crohn's disease Cousin        Social History     Socioeconomic History   • Marital status:      Spouse name: Not on file   • Number of children: Not on file   • Years of education: Not on file   • Highest education level: Not on file   Tobacco Use   • Smoking status: Former Smoker     Packs/day: 0.00     Years: 20.00     Pack years: 0.00     Start date: 2007     Last attempt to quit:      Years since quittin.0   • Smokeless tobacco: Never Used   Substance and Sexual Activity   • Alcohol use: Yes     Alcohol/week: 2.0 standard drinks     Types: 2 Glasses of wine per week     Comment: occ   • Drug use: No   • Sexual activity: Yes     Partners: Male     Birth control/protection: None       Past Surgical History:   Procedure Laterality Date   •  "CHOLECYSTECTOMY      Dr. EDER Alba   • COLONOSCOPY  05/18/2016    polyp, tics, tubular adenoma   • COLONOSCOPY  06/05/2019    normal ileum, diverticulosis, NBIH, TAx1   • ENDOSCOPY  05/18/2016    gastric ulcer w/clean base, acute gastritis. Leiva's esophagus   • ENDOSCOPY  06/05/2019    normal esophagus/duodenum, acute gastritis   • HYSTERECTOMY     • OOPHORECTOMY     • SHOULDER ARTHROSCOPY      Dr. Plunkett   • UPPER GASTROINTESTINAL ENDOSCOPY  05/18/2016       The following portions of the patient's history were reviewed and updated as appropriate: problem list, allergies, current medications, past medical history, past family history, past social history and past surgical history.    Review of Systems   Constitutional: Negative for fatigue.   HENT: Positive for ear pain. Negative for congestion.    Eyes: Negative for visual disturbance.   Respiratory: Negative for shortness of breath.    Cardiovascular: Negative for chest pain.   Gastrointestinal: Positive for GERD. Negative for blood in stool.   Endocrine: Negative.    Genitourinary: Negative.    Musculoskeletal: Negative for arthralgias.   Skin: Negative for rash.   Allergic/Immunologic: Negative for environmental allergies.   Neurological: Negative for headache.   Hematological: Does not bruise/bleed easily.   Psychiatric/Behavioral: The patient is not nervous/anxious.        Immunization History   Administered Date(s) Administered   • Hepatitis A 05/05/2018   • Influenza Quad Vaccine (Inpatient) 09/28/2019   • Tdap 01/23/2020       Objective   Vitals:    01/23/20 0815 01/23/20 0845   BP:  104/70   Pulse:  70   Resp:  16   Weight: 54.2 kg (119 lb 6.4 oz)    Height: 165.1 cm (65\")      Body mass index is 19.87 kg/m².  Physical Exam   Constitutional: She is oriented to person, place, and time. She appears well-developed and well-nourished.   HENT:   Head: Normocephalic and atraumatic.   Right Ear: External ear normal.   Left Ear: External ear normal. "   Mouth/Throat: Oropharynx is clear and moist.   +PND. Nasal mucosa engorged with clear mucosa.    Eyes: Pupils are equal, round, and reactive to light. Conjunctivae and EOM are normal.   Neck: Neck supple. Carotid bruit is not present. No thyromegaly present.   Cardiovascular: Normal rate, regular rhythm, normal heart sounds and intact distal pulses.   No murmur heard.  Pulmonary/Chest: Effort normal and breath sounds normal.   Abdominal: Soft. Bowel sounds are normal. She exhibits no distension and no mass. There is no hepatosplenomegaly. There is no tenderness. There is no rebound.   Musculoskeletal:   Ambulates without assistance; no clubbing, cyanosis or edema.    Lymphadenopathy:     She has no cervical adenopathy.   Neurological: She is alert and oriented to person, place, and time.   Skin: Skin is warm and dry.   Psychiatric: She has a normal mood and affect.   Vitals reviewed.      Procedures    Assessment/Plan   Iliana was seen today for annual exam.    Diagnoses and all orders for this visit:    Annual physical exam  Comments:  Advisded 150 minutes weekly aerobic activity as well as Shingrix at local pharmacy.     Need for diphtheria-tetanus-pertussis (Tdap) vaccine  -     Tdap Vaccine Greater Than or Equal To 6yo IM    Health care maintenance  -     Comprehensive Metabolic Panel  -     Lipid Panel With / Chol / HDL Ratio    Allergic rhinitis, unspecified seasonality, unspecified trigger  Comments:  Likely accounts for ear discomfort. Advise restart nasal steroid--demo'd use.     Osteopenia of spine  Comments:  Advised VD 1,000 IU daily as well as calcium 500 mg bid.     Records reviewed include previous OV with myself as well as labs, endoscopy with path, DXA and MMG.     Return in about 1 year (around 1/23/2021) for Annual physical.

## 2020-01-23 NOTE — PATIENT INSTRUCTIONS
You are advised to get Shingrix. It is a series of 2 shots given 2-6 months apart. Get this at you local pharmacy.

## 2020-01-28 ENCOUNTER — PRIOR AUTHORIZATION (OUTPATIENT)
Dept: GASTROENTEROLOGY | Facility: CLINIC | Age: 55
End: 2020-01-28

## 2020-03-02 ENCOUNTER — TELEPHONE (OUTPATIENT)
Dept: GASTROENTEROLOGY | Facility: CLINIC | Age: 55
End: 2020-03-02

## 2020-03-02 ENCOUNTER — PRIOR AUTHORIZATION (OUTPATIENT)
Dept: GASTROENTEROLOGY | Facility: CLINIC | Age: 55
End: 2020-03-02

## 2020-03-02 NOTE — TELEPHONE ENCOUNTER
PA submitted through [facility] for [employer] 145MCG capsules.  Pending

## 2020-03-02 NOTE — TELEPHONE ENCOUNTER
Received fax that was requesting additional information.   This was completed and faxed back to [place].

## 2020-03-02 NOTE — TELEPHONE ENCOUNTER
----- Message from Iliana Reyes sent at 3/2/2020  9:36 AM EST -----  Regarding: RE: Prescription Question  Contact: [phone]  Thank you Radha!  I am comply out of the medication  ----- Message -----  From: MEGAN GOULD  Sent: 3/2/2020  9:17 AM EST  To: Iliana Reyes  Subject: RE: Prescription Question  Ms. Reyes,  We will move forward with a PA. If it is denied we can move to a different medication.    Sincerely,    Angeles GOULD RN for [facility].     ----- Message -----     From: Iliana Reyes     Sent: 3/2/2020  8:44 AM EST       To: OCTAVIO Nicolas  Subject: Prescription Question    The medication is not approved per [facility]- a new medication  is on the market and insurance is not approving with out a PA. Should we try the new medication?

## 2020-04-22 ENCOUNTER — TELEPHONE (OUTPATIENT)
Dept: GASTROENTEROLOGY | Facility: CLINIC | Age: 55
End: 2020-04-22

## 2020-04-22 ENCOUNTER — TELEMEDICINE (OUTPATIENT)
Dept: GASTROENTEROLOGY | Facility: CLINIC | Age: 55
End: 2020-04-22

## 2020-04-22 VITALS — BODY MASS INDEX: 20.49 KG/M2 | WEIGHT: 120 LBS | HEIGHT: 64 IN

## 2020-04-22 DIAGNOSIS — K59.04 CHRONIC IDIOPATHIC CONSTIPATION: Primary | ICD-10-CM

## 2020-04-22 DIAGNOSIS — K21.9 GASTROESOPHAGEAL REFLUX DISEASE, ESOPHAGITIS PRESENCE NOT SPECIFIED: ICD-10-CM

## 2020-04-22 PROCEDURE — 99213 OFFICE O/P EST LOW 20 MIN: CPT | Performed by: NURSE PRACTITIONER

## 2020-04-22 RX ORDER — LANSOPRAZOLE 30 MG/1
30 CAPSULE, DELAYED RELEASE ORAL DAILY
Qty: 90 CAPSULE | Refills: 3 | Status: SHIPPED | OUTPATIENT
Start: 2020-04-22 | End: 2021-06-10

## 2020-04-22 NOTE — PROGRESS NOTES
Chief Complaint   Patient presents with   • Constipation   • Heartburn     HPI    Iliana Reyes is a  54 y.o. female here for a follow up visit for constipation and GERD.  This patient follows with Dr. Lyons, known to me.  Recent SIBO breath testing was negative.    Constipation had been well managed on Linzess 145 mcg once daily but unfortunately her insurance company did not offer long-term coverage.  Insurance company dictated patient try true Eduardo which she did with no relief.  On true Eduardo she had been increased gas and bloating w/ BM every 5 days.  She is currently using a [employer] brand laxative and stool softener daily to manage constipation.  Bowels are moving every day to every other day but she does report excessive gas, bloating, and feelings of incomplete evacuation.    Patient also complains of excessive belching and heartburn.  She ran out of lansoprazole and was denied a refill from her pharmacy for unclear reasons.  No nausea, vomiting, or dysphagia.  Appetite is good.  Weight is stable.    She does not have her gallbladder.     Previous work-up:     GES 5/19 normal.  EGD 6/19 acute gastritis.  C-scope 6/19 polyp, diverticulosis, NBIH.  Recall 5 years.  MRCP 6//19 with no acute intra-abdominal abnormality.     Past Medical History:   Diagnosis Date   • Leiva esophagus    • Chest pain 2016   • Dyspepsia    • Gastric ulcer    • GERD (gastroesophageal reflux disease)    • Headache    • Irritable bowel syndrome    • Tubular adenoma of colon        Past Surgical History:   Procedure Laterality Date   • CHOLECYSTECTOMY      Dr. EDER Alba   • COLONOSCOPY  05/18/2016    polyp, tics, tubular adenoma   • COLONOSCOPY  06/05/2019    normal ileum, diverticulosis, NBIH, TAx1   • ENDOSCOPY  05/18/2016    gastric ulcer w/clean base, acute gastritis. Leiva's esophagus   • ENDOSCOPY  06/05/2019    normal esophagus/duodenum, acute gastritis   • HYSTERECTOMY     • OOPHORECTOMY     • SHOULDER ARTHROSCOPY        Dimit[facility]   • UPPER GASTROINTESTINAL ENDOSCOPY  2016       Scheduled Meds:  Outpatient Encounter Medications as of 2020   Medication Sig Dispense Refill   • betamethasone, augmented, (DIPROLENE) 0.05 % cream As Needed.  1   • Calcium-Magnesium-Vitamin D (CALCIUM 500 PO) Take  by mouth 2 (Two) Times a Day.     • cholecalciferol (VITAMIN D3) 25 MCG (1000 UT) tablet Take 1,000 Units by mouth Daily.     • diphenhydrAMINE (BENADRYL) 25 mg Take 25 mg by mouth every 6 (six) hours as needed for itching.     • estradiol (VIVELLE-DOT) 0.025 MG/24HR patch PLACE 1 PATCH ONTO THE SKIN AS DIRECTED BY PROVIDER TWICE WEEKLY 8 patch 12   • Multiple Vitamins-Minerals (MULTIVITAMIN ADULT PO) Take  by mouth.     • Omega-3 Fatty Acids (FISH OIL) 1000 MG capsule capsule Take  by mouth Daily With Breakfast.     • lansoprazole (PREVACID) 30 MG capsule Take 1 capsule by mouth Daily. 90 capsule 3   • Plecanatide (Trulance) 3 MG tablet Take 1 tablet by mouth Daily. 30 tablet 11   • [DISCONTINUED] lansoprazole (PREVACID) 30 MG capsule Take 1 capsule by mouth 2 (Two) Times a Day. 60 capsule 5     No facility-administered encounter medications on file as of 2020.        Continuous Infusions:  No current facility-administered medications for this visit.     PRN Meds:.    Allergies   Allergen Reactions   • Amitiza [Lubiprostone] Other (See Comments)     Migraines   • Codeine Hives   • Pantoprazole Nausea And Vomiting       Social History     Socioeconomic History   • Marital status:      Spouse name: Not on file   • Number of children: Not on file   • Years of education: Not on file   • Highest education level: Not on file   Tobacco Use   • Smoking status: Former Smoker     Packs/day: 1.00     Years: 20.00     Pack years: 20.00     Start date: 2007     Last attempt to quit: 2014     Years since quittin.3   • Smokeless tobacco: Never Used   Substance and Sexual Activity   • Alcohol use: Yes     Alcohol/week: 2.0 standard  drinks     Types: 2 Glasses of wine per week     Comment: occ   • Drug use: No   • Sexual activity: Yes     Partners: Male     Birth control/protection: None       Family History   Problem Relation Age of Onset   • Heart disease Mother    • Inflammatory bowel disease Mother    • Colon cancer Mother    • Colon polyps Mother    • Liver cancer Mother    • Ulcerative colitis Mother    • Cerebral aneurysm Father    • Crohn's disease Brother    • Colon polyps Brother    • Crohn's disease Cousin    • Crohn's disease Cousin        Review of Systems   Constitutional: Negative for activity change, appetite change, fatigue, fever and unexpected weight change.   HENT: Negative for trouble swallowing.    Respiratory: Negative for apnea, cough, choking, chest tightness, shortness of breath and wheezing.    Cardiovascular: Negative for chest pain, palpitations and leg swelling.   Gastrointestinal: Positive for constipation. Negative for abdominal distention, abdominal pain, anal bleeding, blood in stool, diarrhea, nausea, rectal pain and vomiting.        + heartburn       There were no vitals filed for this visit.    Physical Exam   Constitutional: She is oriented to person, place, and time.   Neurological: She is alert and oriented to person, place, and time.   Psychiatric: She has a normal mood and affect.       No radiology results for the last 7 days    Iliana was seen today for constipation and heartburn.    Diagnoses and all orders for this visit:    Chronic idiopathic constipation    Gastroesophageal reflux disease, esophagitis presence not specified    Other orders  -     lansoprazole (PREVACID) 30 MG capsule; Take 1 capsule by mouth Daily.    Impression:    Pleasant 54-year-old female seen today in follow-up via my chart video visit.  She is currently managing chronic constipation with [employer] brand laxative and stool softener.  Her bowels are moving regularly but she complains of incomplete evacuation, gas and bloating.   No relief with Trulance which her insurance company required she start even though the Linzess was working quite well.  We will attempt to appeal her Linzess denial now that she is failed Trulance.    She is also having considerable heartburn off of lansoprazole which was denied refill by her pharmacy for unclear reasons.  90-day supply of lansoprazole with 3 refills sent to her [facility] pharmacy.  No alarm symptoms to warrant endoscopic evaluation.    Return to clinic 3 months.    (Telehealth video visit 15 minutes duration)

## 2020-04-23 ENCOUNTER — TELEPHONE (OUTPATIENT)
Dept: GASTROENTEROLOGY | Facility: CLINIC | Age: 55
End: 2020-04-23

## 2020-04-23 NOTE — TELEPHONE ENCOUNTER
----- Message from OCTAVIO Nicolas sent at 4/22/2020  9:12 AM EDT -----  This patient tried and failed true Eduardo.  Can we see if appeal for Linzess is possible?  I am willing to do a peer to peer if needed.

## 2020-04-24 ENCOUNTER — PRIOR AUTHORIZATION (OUTPATIENT)
Dept: GASTROENTEROLOGY | Facility: CLINIC | Age: 55
End: 2020-04-24

## 2020-05-04 ENCOUNTER — TELEPHONE (OUTPATIENT)
Dept: GASTROENTEROLOGY | Facility: CLINIC | Age: 55
End: 2020-05-04

## 2020-05-07 NOTE — TELEPHONE ENCOUNTER
Her Linzess letter is over 30 days old.  Often peer to peer has to be within a certain timeframe.  Raquel what you recommend regarding the need for peer to peer.  She has tried and failed Trulance which was preferred on her insurance.

## 2020-05-08 NOTE — TELEPHONE ENCOUNTER
See above.  We need to start appeal process for this patient's Linzess.  She failed true Eduardo.  If she needs to come by and  samples please set this up for her.

## 2020-06-04 ENCOUNTER — PROCEDURE VISIT (OUTPATIENT)
Dept: OBSTETRICS AND GYNECOLOGY | Facility: CLINIC | Age: 55
End: 2020-06-04

## 2020-06-04 ENCOUNTER — OFFICE VISIT (OUTPATIENT)
Dept: OBSTETRICS AND GYNECOLOGY | Facility: CLINIC | Age: 55
End: 2020-06-04

## 2020-06-04 ENCOUNTER — APPOINTMENT (OUTPATIENT)
Dept: WOMENS IMAGING | Facility: HOSPITAL | Age: 55
End: 2020-06-04

## 2020-06-04 VITALS
WEIGHT: 121 LBS | HEIGHT: 64 IN | DIASTOLIC BLOOD PRESSURE: 70 MMHG | BODY MASS INDEX: 20.66 KG/M2 | SYSTOLIC BLOOD PRESSURE: 110 MMHG

## 2020-06-04 DIAGNOSIS — N89.0 MILD VAGINAL DYSPLASIA: ICD-10-CM

## 2020-06-04 DIAGNOSIS — M85.80 OSTEOPENIA, UNSPECIFIED LOCATION: ICD-10-CM

## 2020-06-04 DIAGNOSIS — Z12.39 SCREENING FOR BREAST CANCER: ICD-10-CM

## 2020-06-04 DIAGNOSIS — N95.1 MENOPAUSAL SYMPTOMS: ICD-10-CM

## 2020-06-04 DIAGNOSIS — Z12.31 VISIT FOR SCREENING MAMMOGRAM: Primary | ICD-10-CM

## 2020-06-04 DIAGNOSIS — Z01.419 ENCOUNTER FOR GYNECOLOGICAL EXAMINATION WITHOUT ABNORMAL FINDING: Primary | ICD-10-CM

## 2020-06-04 PROCEDURE — 77067 SCR MAMMO BI INCL CAD: CPT | Performed by: OBSTETRICS & GYNECOLOGY

## 2020-06-04 PROCEDURE — 99396 PREV VISIT EST AGE 40-64: CPT | Performed by: OBSTETRICS & GYNECOLOGY

## 2020-06-04 PROCEDURE — 77063 BREAST TOMOSYNTHESIS BI: CPT | Performed by: OBSTETRICS & GYNECOLOGY

## 2020-06-04 PROCEDURE — 77067 SCR MAMMO BI INCL CAD: CPT | Performed by: RADIOLOGY

## 2020-06-04 PROCEDURE — 77063 BREAST TOMOSYNTHESIS BI: CPT | Performed by: RADIOLOGY

## 2020-06-04 RX ORDER — ESTRADIOL 0.03 MG/D
1 FILM, EXTENDED RELEASE TRANSDERMAL 2 TIMES WEEKLY
Qty: 24 PATCH | Refills: 3 | Status: SHIPPED | OUTPATIENT
Start: 2020-06-04 | End: 2020-12-07

## 2020-06-04 RX ORDER — LINACLOTIDE 145 UG/1
CAPSULE, GELATIN COATED ORAL
COMMUNITY
Start: 2020-04-30 | End: 2021-01-14

## 2020-06-04 NOTE — PROGRESS NOTES
Subjective    Chief Complaint   Patient presents with   • Gynecologic Exam     AE      History of Present Illness    Iliana Reyes is a 54 y.o. female who presents for annual exam.  Non-smoker.  Mammogram today.  DEXA scan May 2019 showed mild osteopenia of the spine only.  Previous hysterectomy with BSO.  Patient on Vivelle dot 0.025 twice weekly.  Colonoscopy 9 months ago showed polyps and due again in 3 years.  History mild dysplasia of the vagina not evident on last Pap.    Obstetric History:  OB History        2    Para   2    Term   2            AB        Living           SAB        TAB        Ectopic        Molar        Multiple        Live Births                   Menstrual History:     No LMP recorded (lmp unknown). Patient has had a hysterectomy.       Past Medical History:   Diagnosis Date   • Leiva esophagus    • Chest pain    • Dyspepsia    • Gastric ulcer    • GERD (gastroesophageal reflux disease)    • Headache    • Irritable bowel syndrome    • Tubular adenoma of colon      Family History   Problem Relation Age of Onset   • Heart disease Mother    • Inflammatory bowel disease Mother    • Colon cancer Mother    • Colon polyps Mother    • Liver cancer Mother    • Ulcerative colitis Mother    • Cerebral aneurysm Father    • Crohn's disease Brother    • Colon polyps Brother    • Crohn's disease Cousin    • Crohn's disease Cousin      Social History     Tobacco Use   Smoking Status Former Smoker   • Packs/day: 1.00   • Years: 20.00   • Pack years: 20.00   • Start date: 2007   • Last attempt to quit:    • Years since quittin.4   Smokeless Tobacco Never Used         The following portions of the patient's history were reviewed and updated as appropriate: allergies, current medications, past family history, past medical history, past social history, past surgical history and problem list.    Review of Systems   Constitutional: Negative.  Negative for fever and unexpected  "weight change.   HENT: Negative.    Respiratory: Negative for shortness of breath and wheezing.    Cardiovascular: Negative for chest pain, palpitations and leg swelling.   Gastrointestinal: Negative for abdominal pain, anal bleeding and blood in stool.   Genitourinary: Negative for dysuria, pelvic pain, urgency, vaginal bleeding, vaginal discharge and vaginal pain.   Skin: Negative.    Neurological: Negative.    Hematological: Negative.  Negative for adenopathy.   Psychiatric/Behavioral: Negative.  Negative for dysphoric mood. The patient is not nervous/anxious.             Objective   Physical Exam   Constitutional: She is oriented to person, place, and time. She appears well-developed and well-nourished.   HENT:   Head: Normocephalic.   Eyes: Pupils are equal, round, and reactive to light.   Neck: No tracheal deviation present. No thyromegaly present.   Cardiovascular: Normal rate, regular rhythm and normal heart sounds.   No murmur heard.  Pulmonary/Chest: Effort normal and breath sounds normal. No respiratory distress.   Breasts without masses, tenderness or nipple discharge   Abdominal: Soft. She exhibits no mass. There is no hepatosplenomegaly. There is no tenderness. No hernia.   Genitourinary: Rectum normal and vagina normal. No vaginal discharge found.   Genitourinary Comments: External genitalia normal   Lymphadenopathy:     She has no cervical adenopathy.     She has no axillary adenopathy.        Right: No inguinal adenopathy present.        Left: No inguinal adenopathy present.   Neurological: She is alert and oriented to person, place, and time.   Skin: Skin is warm and dry. No rash noted.   Psychiatric: She has a normal mood and affect. Her behavior is normal.   Vitals reviewed.      /70   Ht 162.6 cm (64\")   Wt 54.9 kg (121 lb)   LMP  (LMP Unknown) Comment: complete  BMI 20.77 kg/m²     Assessment/Plan   Iliana was seen today for gynecologic exam.    Diagnoses and all orders for this " visit:    Encounter for gynecological examination without abnormal finding  -     IGP,rfx Aptima HPV All Pth    Menopausal symptoms    Osteopenia, unspecified location    Screening for breast cancer    Mild vaginal dysplasia    Other orders  -     estradiol (Vivelle-Dot) 0.025 MG/24HR patch; Place 1 patch on the skin as directed by provider 2 (Two) Times a Week.        Mammogram.  Return to office 1 year with Pap as long as no question of high-grade dysplasia today.  Counseled about continuing calcium with vitamin D.

## 2020-06-08 LAB
CONV .: NORMAL
CYTOLOGIST CVX/VAG CYTO: NORMAL
CYTOLOGY CVX/VAG DOC CYTO: NORMAL
CYTOLOGY CVX/VAG DOC THIN PREP: NORMAL
DX ICD CODE: NORMAL
HIV 1 & 2 AB SER-IMP: NORMAL
OTHER STN SPEC: NORMAL
STAT OF ADQ CVX/VAG CYTO-IMP: NORMAL

## 2020-07-20 ENCOUNTER — TELEPHONE (OUTPATIENT)
Dept: GASTROENTEROLOGY | Facility: CLINIC | Age: 55
End: 2020-07-20

## 2020-07-20 NOTE — TELEPHONE ENCOUNTER
Left a message to r/s due to Mayelin being out   Addie Guzman  (RN)  2019 13:56:18 Samantha Garcia  (RN)  2019 02:34:15

## 2020-12-07 ENCOUNTER — OFFICE VISIT (OUTPATIENT)
Dept: GASTROENTEROLOGY | Facility: CLINIC | Age: 55
End: 2020-12-07

## 2020-12-07 VITALS — WEIGHT: 121.2 LBS | BODY MASS INDEX: 20.19 KG/M2 | HEIGHT: 65 IN | TEMPERATURE: 97 F

## 2020-12-07 DIAGNOSIS — K59.04 CHRONIC IDIOPATHIC CONSTIPATION: Primary | ICD-10-CM

## 2020-12-07 DIAGNOSIS — K21.9 GASTROESOPHAGEAL REFLUX DISEASE, UNSPECIFIED WHETHER ESOPHAGITIS PRESENT: ICD-10-CM

## 2020-12-07 DIAGNOSIS — Z86.010 PERSONAL HISTORY OF COLONIC POLYPS: ICD-10-CM

## 2020-12-07 PROCEDURE — 99213 OFFICE O/P EST LOW 20 MIN: CPT | Performed by: NURSE PRACTITIONER

## 2020-12-07 NOTE — PROGRESS NOTES
Chief Complaint   Patient presents with   • Follow-up   • Heartburn     HPI    Iliana Reyes is a  54 y.o. female here for a follow up visit for heartburn and constipation.  This patient also follows with Dr. Lyons.      Currently GERD symptoms well managed on lansoprazole 30 mg once daily.  Patient denies nausea, vomiting, or dysphagia.  Appetite is good.  Weight is stable.    Insurance approved Linzess after last office visit.  Patient try to feel Trulance.  Currently taking Linzess 145 mcg once daily with major improvement in constipation.  Bowels are moving every day to every other day with complete evacuation.  She denies abdominal pain, rectal pain, or rectal bleeding.    She does not have her gallbladder.     Previous work-up:     GES 5/19 normal.  EGD 6/19 acute gastritis.  C-scope 6/19 polyp, diverticulosis, NBIH.  Recall 5 years.  MRCP 6//19 with no acute intra-abdominal abnormality.       Past Medical History:   Diagnosis Date   • Leiva esophagus    • Chest pain 2016   • Dyspepsia    • Gastric ulcer    • GERD (gastroesophageal reflux disease)    • Headache    • Irritable bowel syndrome    • Tubular adenoma of colon        Past Surgical History:   Procedure Laterality Date   • CHOLECYSTECTOMY      Dr. EDER Alba   • COLONOSCOPY  05/18/2016    polyp, tics, tubular adenoma   • COLONOSCOPY  06/05/2019    normal ileum, diverticulosis, NBIH, TAx1   • ENDOSCOPY  05/18/2016    gastric ulcer w/clean base, acute gastritis. Leiva's esophagus   • ENDOSCOPY  06/05/2019    normal esophagus/duodenum, acute gastritis   • HYSTERECTOMY     • OOPHORECTOMY     • SHOULDER ARTHROSCOPY      Dr. Plunkett   • UPPER GASTROINTESTINAL ENDOSCOPY  05/18/2016       Scheduled Meds:  Outpatient Encounter Medications as of 12/7/2020   Medication Sig Dispense Refill   • Calcium-Magnesium-Vitamin D (CALCIUM 500 PO) Take  by mouth 2 (Two) Times a Day.     • cholecalciferol (VITAMIN D3) 25 MCG (1000 UT) tablet Take 1,000 Units by mouth  Daily.     • estradiol (VIVELLE-DOT) 0.025 MG/24HR patch PLACE 1 PATCH ONTO THE SKIN AS DIRECTED BY PROVIDER TWICE WEEKLY 8 patch 12   • lansoprazole (PREVACID) 30 MG capsule Take 1 capsule by mouth Daily. 90 capsule 3   • LINZESS 145 MCG capsule capsule TK 1 C PO QAM BEFORE BREAKFAST     • Omega-3 Fatty Acids (FISH OIL) 1000 MG capsule capsule Take  by mouth Daily With Breakfast.     • diphenhydrAMINE (BENADRYL) 25 mg Take 25 mg by mouth every 6 (six) hours as needed for itching.     • [DISCONTINUED] betamethasone, augmented, (DIPROLENE) 0.05 % cream As Needed.  1   • [DISCONTINUED] estradiol (Vivelle-Dot) 0.025 MG/24HR patch Place 1 patch on the skin as directed by provider 2 (Two) Times a Week. 24 patch 3   • [DISCONTINUED] Multiple Vitamins-Minerals (MULTIVITAMIN ADULT PO) Take  by mouth.       No facility-administered encounter medications on file as of 2020.        Continuous Infusions:No current facility-administered medications for this visit.       PRN Meds:.    Allergies   Allergen Reactions   • Amitiza [Lubiprostone] Other (See Comments)     Migraines   • Codeine Hives   • Pantoprazole Nausea And Vomiting       Social History     Socioeconomic History   • Marital status:      Spouse name: Not on file   • Number of children: Not on file   • Years of education: Not on file   • Highest education level: Not on file   Tobacco Use   • Smoking status: Former Smoker     Packs/day: 1.00     Years: 20.00     Pack years: 20.00     Start date: 2007     Quit date:      Years since quittin.9   • Smokeless tobacco: Never Used   Substance and Sexual Activity   • Alcohol use: Yes     Alcohol/week: 2.0 standard drinks     Types: 2 Glasses of wine per week     Comment: occ   • Drug use: No   • Sexual activity: Yes     Partners: Male     Birth control/protection: None       Family History   Problem Relation Age of Onset   • Heart disease Mother    • Inflammatory bowel disease Mother    • Colon cancer  Mother    • Colon polyps Mother    • Liver cancer Mother    • Ulcerative colitis Mother    • Cerebral aneurysm Father    • Crohn's disease Brother    • Colon polyps Brother    • Crohn's disease Cousin    • Crohn's disease Cousin        Review of Systems   Constitutional: Negative for activity change, appetite change, fatigue, fever and unexpected weight change.   HENT: Negative for trouble swallowing.    Respiratory: Negative for apnea, cough, choking, chest tightness, shortness of breath and wheezing.    Cardiovascular: Negative for chest pain, palpitations and leg swelling.   Gastrointestinal: Negative for abdominal distention, abdominal pain, anal bleeding, blood in stool, constipation, diarrhea, nausea, rectal pain and vomiting.       Vitals:    12/07/20 0900   Temp: 97 °F (36.1 °C)       Physical Exam  Constitutional:       Appearance: She is well-developed.   Cardiovascular:      Rate and Rhythm: Normal rate and regular rhythm.      Heart sounds: Normal heart sounds.   Pulmonary:      Effort: Pulmonary effort is normal. No respiratory distress.      Breath sounds: Normal breath sounds. No wheezing.   Abdominal:      General: Bowel sounds are normal. There is no distension.      Palpations: Abdomen is soft. There is no mass.      Tenderness: There is no abdominal tenderness. There is no guarding.      Hernia: No hernia is present.   Skin:     General: Skin is warm and dry.   Neurological:      Mental Status: She is alert and oriented to person, place, and time.   Psychiatric:         Behavior: Behavior normal.       No radiology results for the last 7 days    Diagnoses and all orders for this visit:    1. Chronic idiopathic constipation (Primary)    2. Gastroesophageal reflux disease, unspecified whether esophagitis present    3. Personal history of colonic polyps      Assessment/plan     Stable chronic idiopathic constipation, continue Linzess at current dosage. We talked about treatment for constipation  including increased physical activity, adequate rest, adequate hydration with 6-8 glasses of water per day, and high fiber diet.     Stable GERD, continue PPI therapy.  Antireflux dietary precautions reinforced.    Colonoscopy for surveillance purposes 4 years.    Return to clinic 6 months.

## 2021-01-14 RX ORDER — LINACLOTIDE 145 UG/1
CAPSULE, GELATIN COATED ORAL
Qty: 90 CAPSULE | Refills: 3 | Status: SHIPPED | OUTPATIENT
Start: 2021-01-14 | End: 2021-10-28 | Stop reason: SDUPTHER

## 2021-02-01 ENCOUNTER — OFFICE VISIT (OUTPATIENT)
Dept: INTERNAL MEDICINE | Facility: CLINIC | Age: 56
End: 2021-02-01

## 2021-02-01 VITALS
DIASTOLIC BLOOD PRESSURE: 66 MMHG | TEMPERATURE: 97.6 F | SYSTOLIC BLOOD PRESSURE: 102 MMHG | BODY MASS INDEX: 19.83 KG/M2 | HEIGHT: 65 IN | WEIGHT: 119 LBS

## 2021-02-01 DIAGNOSIS — R35.0 URINARY FREQUENCY: Primary | ICD-10-CM

## 2021-02-01 LAB
BILIRUB BLD-MCNC: NEGATIVE MG/DL
CLARITY, POC: CLEAR
COLOR UR: YELLOW
GLUCOSE UR STRIP-MCNC: NEGATIVE MG/DL
KETONES UR QL: NEGATIVE
LEUKOCYTE EST, POC: NEGATIVE
NITRITE UR-MCNC: NEGATIVE MG/ML
PH UR: 6 [PH] (ref 5–8)
PROT UR STRIP-MCNC: NEGATIVE MG/DL
RBC # UR STRIP: NEGATIVE /UL
SP GR UR: 1.01 (ref 1–1.03)
UROBILINOGEN UR QL: NORMAL

## 2021-02-01 PROCEDURE — 99213 OFFICE O/P EST LOW 20 MIN: CPT | Performed by: PHYSICIAN ASSISTANT

## 2021-02-01 PROCEDURE — 81003 URINALYSIS AUTO W/O SCOPE: CPT | Performed by: PHYSICIAN ASSISTANT

## 2021-02-02 LAB
APPEARANCE UR: CLEAR
BACTERIA #/AREA URNS HPF: NORMAL /HPF
BILIRUB UR QL STRIP: NEGATIVE
COLOR UR: YELLOW
EPI CELLS #/AREA URNS HPF: NORMAL /HPF (ref 0–10)
GLUCOSE UR QL: NEGATIVE
HGB UR QL STRIP: NEGATIVE
KETONES UR QL STRIP: NEGATIVE
LEUKOCYTE ESTERASE UR QL STRIP: NEGATIVE
MICRO URNS: NORMAL
MICRO URNS: NORMAL
NITRITE UR QL STRIP: NEGATIVE
PH UR STRIP: 6 [PH] (ref 5–7.5)
PROT UR QL STRIP: NEGATIVE
RBC #/AREA URNS HPF: NORMAL /HPF (ref 0–2)
SP GR UR: 1.01 (ref 1–1.03)
URINALYSIS REFLEX: NORMAL
UROBILINOGEN UR STRIP-MCNC: 0.2 MG/DL (ref 0.2–1)
WBC #/AREA URNS HPF: NORMAL /HPF (ref 0–5)

## 2021-03-30 ENCOUNTER — BULK ORDERING (OUTPATIENT)
Dept: CASE MANAGEMENT | Facility: OTHER | Age: 56
End: 2021-03-30

## 2021-03-30 DIAGNOSIS — Z23 IMMUNIZATION DUE: ICD-10-CM

## 2021-04-05 RX ORDER — ESTRADIOL 0.03 MG/D
FILM, EXTENDED RELEASE TRANSDERMAL
Qty: 24 PATCH | Refills: 3 | Status: SHIPPED | OUTPATIENT
Start: 2021-04-05 | End: 2022-04-19

## 2021-04-28 ENCOUNTER — TELEPHONE (OUTPATIENT)
Dept: GASTROENTEROLOGY | Facility: CLINIC | Age: 56
End: 2021-04-28

## 2021-04-28 NOTE — TELEPHONE ENCOUNTER
PA for Linzess faxed to [facility] via [facility]. Awaiting plan response.

## 2021-06-07 ENCOUNTER — OFFICE VISIT (OUTPATIENT)
Dept: GASTROENTEROLOGY | Facility: CLINIC | Age: 56
End: 2021-06-07

## 2021-06-07 VITALS — HEIGHT: 65 IN | WEIGHT: 117.6 LBS | BODY MASS INDEX: 19.59 KG/M2

## 2021-06-07 DIAGNOSIS — K59.04 CHRONIC IDIOPATHIC CONSTIPATION: Primary | ICD-10-CM

## 2021-06-07 DIAGNOSIS — K21.9 GASTROESOPHAGEAL REFLUX DISEASE, UNSPECIFIED WHETHER ESOPHAGITIS PRESENT: ICD-10-CM

## 2021-06-07 DIAGNOSIS — Z86.010 PERSONAL HISTORY OF COLONIC POLYPS: ICD-10-CM

## 2021-06-07 PROCEDURE — 99213 OFFICE O/P EST LOW 20 MIN: CPT | Performed by: NURSE PRACTITIONER

## 2021-06-07 NOTE — PROGRESS NOTES
Chief Complaint   Patient presents with   • Follow-up     HPI    Iliana Reyes is a  55 y.o. female here for a follow up visit for GERD and constipation.    This is an established patient of Dr. Lyons's and myself.    She reports adequate control of dyspeptic symptoms on once daily Prevacid.  No nausea, vomiting, abdominal pain, or dysphagia.  BMs daily with complete evacuation on Linzess 145 mcg/day.  No weight loss or rectal bleeding.    She has a personal history of colon polyps and will be due for a screening colonoscopy in 2024.      Past Medical History:   Diagnosis Date   • Leiva esophagus    • Chest pain 2016   • Dyspepsia    • Gastric ulcer    • GERD (gastroesophageal reflux disease)    • Headache    • Irritable bowel syndrome    • Tubular adenoma of colon        Past Surgical History:   Procedure Laterality Date   • CHOLECYSTECTOMY      Dr. EDER Alba   • COLONOSCOPY  05/18/2016    polyp, tics, tubular adenoma   • COLONOSCOPY  06/05/2019    normal ileum, diverticulosis, NBIH, TAx1   • ENDOSCOPY  05/18/2016    gastric ulcer w/clean base, acute gastritis. Leiva's esophagus   • ENDOSCOPY  06/05/2019    normal esophagus/duodenum, acute gastritis   • HYSTERECTOMY     • OOPHORECTOMY     • SHOULDER ARTHROSCOPY      Dr. Plunkett   • UPPER GASTROINTESTINAL ENDOSCOPY  05/18/2016       Scheduled Meds:  Outpatient Encounter Medications as of 6/7/2021   Medication Sig Dispense Refill   • Calcium-Magnesium-Vitamin D (CALCIUM 500 PO) Take  by mouth 2 (Two) Times a Day.     • cholecalciferol (VITAMIN D3) 25 MCG (1000 UT) tablet Take 1,000 Units by mouth Daily.     • diphenhydrAMINE (BENADRYL) 25 mg Take 25 mg by mouth every 6 (six) hours as needed for itching.     • estradiol (VIVELLE-DOT) 0.025 MG/24HR patch PLACE 1 PATCH ON THE SKIN AS DIRECTED BY PROVIDER TWICE A WEEK 24 patch 3   • lansoprazole (PREVACID) 30 MG capsule Take 1 capsule by mouth Daily. 90 capsule 3   • Linzess 145 MCG capsule capsule TAKE 1 CAPSULE BY  MOUTH EVERY MORNING BEFORE BREAKFAST 90 capsule 3   • Omega-3 Fatty Acids (FISH OIL) 1000 MG capsule capsule Take  by mouth Daily With Breakfast.       No facility-administered encounter medications on file as of 2021.       Continuous Infusions:No current facility-administered medications for this visit.      PRN Meds:.    Allergies   Allergen Reactions   • Amitiza [Lubiprostone] Other (See Comments)     Migraines   • Codeine Hives   • Pantoprazole Nausea And Vomiting       Social History     Socioeconomic History   • Marital status:      Spouse name: Not on file   • Number of children: Not on file   • Years of education: Not on file   • Highest education level: Not on file   Tobacco Use   • Smoking status: Former Smoker     Packs/day: 1.00     Years: 20.00     Pack years: 20.00     Start date: 2007     Quit date:      Years since quittin.4   • Smokeless tobacco: Never Used   Substance and Sexual Activity   • Alcohol use: Yes     Alcohol/week: 2.0 standard drinks     Types: 2 Glasses of wine per week     Comment: occ   • Drug use: No   • Sexual activity: Yes     Partners: Male     Birth control/protection: None       Family History   Problem Relation Age of Onset   • Heart disease Mother    • Inflammatory bowel disease Mother    • Colon cancer Mother    • Colon polyps Mother    • Liver cancer Mother    • Ulcerative colitis Mother    • Cerebral aneurysm Father    • Crohn's disease Brother    • Colon polyps Brother    • Crohn's disease Cousin    • Crohn's disease Cousin        Review of Systems   Constitutional: Negative for activity change, appetite change, fatigue, fever and unexpected weight change.   HENT: Negative for trouble swallowing.    Respiratory: Negative for apnea, cough, choking, chest tightness, shortness of breath and wheezing.    Cardiovascular: Negative for chest pain, palpitations and leg swelling.   Gastrointestinal: Negative for abdominal distention, abdominal pain, anal  bleeding, blood in stool, constipation, diarrhea, nausea, rectal pain and vomiting.       There were no vitals filed for this visit.    Physical Exam  Constitutional:       Appearance: She is well-developed.   Abdominal:      General: Bowel sounds are normal. There is no distension.      Palpations: Abdomen is soft. There is no mass.      Tenderness: There is no abdominal tenderness. There is no guarding.      Hernia: No hernia is present.   Musculoskeletal:         General: Normal range of motion.   Skin:     General: Skin is warm and dry.      Capillary Refill: Capillary refill takes less than 2 seconds.   Neurological:      Mental Status: She is alert and oriented to person, place, and time.   Psychiatric:         Behavior: Behavior normal.       No radiology results for the last 7 days    Diagnoses and all orders for this visit:    1. Chronic idiopathic constipation (Primary)    2. Gastroesophageal reflux disease, unspecified whether esophagitis present    3. Personal history of colonic polyps    Assessment/plan    Stable constipation, continue Linzess.    Stable GERD, continue Prevacid.  No alarm symptoms on visit today to warrant endoscopic evaluation.    Colonoscopy for surveillance purposes 2024.    Follow-up 1 year or sooner if needed.

## 2021-06-10 ENCOUNTER — OFFICE VISIT (OUTPATIENT)
Dept: OBSTETRICS AND GYNECOLOGY | Facility: CLINIC | Age: 56
End: 2021-06-10

## 2021-06-10 ENCOUNTER — PROCEDURE VISIT (OUTPATIENT)
Dept: OBSTETRICS AND GYNECOLOGY | Facility: CLINIC | Age: 56
End: 2021-06-10

## 2021-06-10 ENCOUNTER — APPOINTMENT (OUTPATIENT)
Dept: WOMENS IMAGING | Facility: HOSPITAL | Age: 56
End: 2021-06-10

## 2021-06-10 VITALS
DIASTOLIC BLOOD PRESSURE: 80 MMHG | WEIGHT: 118 LBS | BODY MASS INDEX: 19.66 KG/M2 | SYSTOLIC BLOOD PRESSURE: 114 MMHG | HEIGHT: 65 IN

## 2021-06-10 DIAGNOSIS — M85.80 OSTEOPENIA, UNSPECIFIED LOCATION: ICD-10-CM

## 2021-06-10 DIAGNOSIS — Z12.31 VISIT FOR SCREENING MAMMOGRAM: Primary | ICD-10-CM

## 2021-06-10 DIAGNOSIS — N95.1 MENOPAUSAL SYMPTOMS: ICD-10-CM

## 2021-06-10 DIAGNOSIS — Z01.419 ENCOUNTER FOR GYNECOLOGICAL EXAMINATION WITHOUT ABNORMAL FINDING: Primary | ICD-10-CM

## 2021-06-10 DIAGNOSIS — Z12.39 ENCOUNTER FOR BREAST CANCER SCREENING USING NON-MAMMOGRAM MODALITY: ICD-10-CM

## 2021-06-10 PROCEDURE — 99396 PREV VISIT EST AGE 40-64: CPT | Performed by: OBSTETRICS & GYNECOLOGY

## 2021-06-10 PROCEDURE — 77067 SCR MAMMO BI INCL CAD: CPT | Performed by: RADIOLOGY

## 2021-06-10 PROCEDURE — 77063 BREAST TOMOSYNTHESIS BI: CPT | Performed by: RADIOLOGY

## 2021-06-10 PROCEDURE — 77063 BREAST TOMOSYNTHESIS BI: CPT | Performed by: OBSTETRICS & GYNECOLOGY

## 2021-06-10 PROCEDURE — 77067 SCR MAMMO BI INCL CAD: CPT | Performed by: OBSTETRICS & GYNECOLOGY

## 2021-06-10 RX ORDER — ESTRADIOL 0.03 MG/D
1 FILM, EXTENDED RELEASE TRANSDERMAL 2 TIMES WEEKLY
Qty: 8 PATCH | Refills: 12 | Status: SHIPPED | OUTPATIENT
Start: 2021-06-10 | End: 2022-02-21 | Stop reason: SDUPTHER

## 2021-06-10 RX ORDER — AMOXICILLIN 500 MG/1
CAPSULE ORAL
COMMUNITY
Start: 2021-05-28 | End: 2022-02-21

## 2021-06-10 NOTE — PROGRESS NOTES
Subjective    Chief Complaint   Patient presents with   • Gynecologic Exam     AE      History of Present Illness    Iliana Reyes is a 55 y.o. female who presents for annual exam.  Non-smoker.  DEXA scan May 2019 showed osteopenia.  Patient wanting another one scheduled.  Mammogram today.  Colonoscopy 2019 showed polyps.  Patient on Vivelle dot 0.025 mg twice weekly and wants to continue.  Previous mild dysplasia of vagina with recent Paps negative.  History hysterectomy with BSO.    Obstetric History:  OB History        2    Para   2    Term   2            AB        Living           SAB        TAB        Ectopic        Molar        Multiple        Live Births                   Menstrual History:     No LMP recorded (lmp unknown). Patient has had a hysterectomy.       Past Medical History:   Diagnosis Date   • Leiva esophagus    • Chest pain    • Dyspepsia    • Gastric ulcer    • GERD (gastroesophageal reflux disease)    • Headache    • Irritable bowel syndrome    • Tubular adenoma of colon      Family History   Problem Relation Age of Onset   • Heart disease Mother    • Inflammatory bowel disease Mother    • Colon cancer Mother    • Colon polyps Mother    • Liver cancer Mother    • Ulcerative colitis Mother    • Cerebral aneurysm Father    • Crohn's disease Brother    • Colon polyps Brother    • Crohn's disease Cousin    • Crohn's disease Cousin      Social History     Tobacco Use   Smoking Status Former Smoker   • Packs/day: 1.00   • Years: 20.00   • Pack years: 20.00   • Start date: 2007   • Quit date:    • Years since quittin.4   Smokeless Tobacco Never Used         The following portions of the patient's history were reviewed and updated as appropriate: allergies, current medications, past family history, past medical history, past social history, past surgical history and problem list.    Review of Systems   Constitutional: Negative.  Negative for fever and unexpected  weight change.   HENT: Negative.    Respiratory: Negative for shortness of breath and wheezing.    Cardiovascular: Negative for chest pain, palpitations and leg swelling.   Gastrointestinal: Negative for abdominal pain, anal bleeding and blood in stool.   Genitourinary: Negative for dysuria, pelvic pain, urgency, vaginal bleeding, vaginal discharge and vaginal pain.   Skin: Negative.    Neurological: Negative.    Hematological: Negative.  Negative for adenopathy.   Psychiatric/Behavioral: Negative.  Negative for dysphoric mood. The patient is not nervous/anxious.             Objective   Physical Exam  Vitals reviewed. Exam conducted with a chaperone present.   Constitutional:       Appearance: She is well-developed.   HENT:      Head: Normocephalic.   Eyes:      Pupils: Pupils are equal, round, and reactive to light.   Neck:      Thyroid: No thyromegaly.      Trachea: No tracheal deviation.   Cardiovascular:      Rate and Rhythm: Normal rate and regular rhythm.      Heart sounds: Normal heart sounds. No murmur heard.     Pulmonary:      Effort: Pulmonary effort is normal. No respiratory distress.      Breath sounds: Normal breath sounds.   Chest:      Breasts:         Right: Normal. No mass, nipple discharge or tenderness.         Left: Normal. No mass, nipple discharge or tenderness.   Abdominal:      Palpations: Abdomen is soft. There is no mass.      Tenderness: There is no abdominal tenderness.      Hernia: No hernia is present.   Genitourinary:     General: Normal vulva.      Labia:         Right: No tenderness or lesion.         Left: No tenderness or lesion.       Urethra: No prolapse or urethral lesion.      Vagina: Normal. No vaginal discharge.      Uterus: Absent.       Adnexa:         Right: No fullness.          Left: No fullness.        Rectum: Normal. No external hemorrhoid or internal hemorrhoid. Normal anal tone.      Comments: External genitalia normal  Lymphadenopathy:      Cervical: No cervical  "adenopathy.      Upper Body:      Right upper body: No axillary adenopathy.      Left upper body: No axillary adenopathy.   Skin:     General: Skin is warm and dry.      Findings: No rash.   Neurological:      Mental Status: She is alert and oriented to person, place, and time.   Psychiatric:         Behavior: Behavior normal.         /80   Ht 165.1 cm (65\")   Wt 53.5 kg (118 lb)   LMP  (LMP Unknown) Comment: complete  BMI 19.64 kg/m²     Assessment/Plan   Diagnoses and all orders for this visit:    1. Encounter for gynecological examination without abnormal finding (Primary)  -     IGP,rfx Aptima HPV All Pth    2. Menopausal symptoms    3. Osteopenia, unspecified location  -     DEXA Bone Density Axial; Future    4. Encounter for breast cancer screening using non-mammogram modality    Other orders  -     estradiol (VIVELLE-DOT) 0.025 MG/24HR patch; Place 1 patch on the skin as directed by provider 2 (Two) Times a Week.  Dispense: 8 patch; Refill: 12        Mammogram.  Return to office 1 year.  Counseled about continuing calcium with vitamin D and rechecking DEXA scan for osteoporosis prevention.             "

## 2021-10-11 NOTE — PROGRESS NOTES
Subjective   Chief Complaint   Patient presents with   • Annual Exam       History of Present Illness   55 y.o. female presents for annual physical. Feeling good overall but lost 2 brothers to massive MIs--one at age 40 and the other at age 48; both were smokers. She quit smoking 5 years ago with the birth of her grandchild. Walks every day 45-60 minutes about 3 miles. Dad has 2 brain aneurysms and he is still living at age 75. She has never had screening. P/P UTD with Dr. Carvajal as is her MMG. CLS with Dr. Saunders 2019--tubular adenoma with recall in 5 years. Eye exam 3 weeks ago. Dental care UTD. COVID vaccination completed. Flus shot today. Not yet had SG.     Patient Active Problem List   Diagnosis   • ASCUS with positive high risk HPV cervical       Allergies   Allergen Reactions   • Amitiza [Lubiprostone] Other (See Comments)     Migraines   • Codeine Hives   • Pantoprazole Nausea And Vomiting       Current Outpatient Medications on File Prior to Visit   Medication Sig Dispense Refill   • Calcium-Magnesium-Vitamin D (CALCIUM 500 PO) Take  by mouth 2 (Two) Times a Day.     • cholecalciferol (VITAMIN D3) 25 MCG (1000 UT) tablet Take 1,000 Units by mouth Daily.     • diphenhydrAMINE (BENADRYL) 25 mg Take 25 mg by mouth every 6 (six) hours as needed for itching.     • estradiol (VIVELLE-DOT) 0.025 MG/24HR patch PLACE 1 PATCH ON THE SKIN AS DIRECTED BY PROVIDER TWICE A WEEK 24 patch 3   • Linzess 145 MCG capsule capsule TAKE 1 CAPSULE BY MOUTH EVERY MORNING BEFORE BREAKFAST 90 capsule 3   • Omega-3 Fatty Acids (FISH OIL) 1000 MG capsule capsule Take  by mouth Daily With Breakfast.     • amoxicillin (AMOXIL) 500 MG capsule      • estradiol (VIVELLE-DOT) 0.025 MG/24HR patch Place 1 patch on the skin as directed by provider 2 (Two) Times a Week. 8 patch 12     No current facility-administered medications on file prior to visit.       Past Medical History:   Diagnosis Date   • Leiva esophagus    • Chest pain 2016   •  Dyspepsia    • Gastric ulcer    • GERD (gastroesophageal reflux disease)    • Headache    • Irritable bowel syndrome    • Tubular adenoma of colon        Family History   Problem Relation Age of Onset   • Heart disease Mother    • Inflammatory bowel disease Mother    • Colon cancer Mother    • Colon polyps Mother    • Liver cancer Mother    • Ulcerative colitis Mother    • Cerebral aneurysm Father    • Crohn's disease Brother    • Colon polyps Brother    • Crohn's disease Cousin    • Crohn's disease Cousin    • Heart attack Brother 48   • Heart attack Brother 40       Social History     Socioeconomic History   • Marital status:    Tobacco Use   • Smoking status: Former Smoker     Packs/day: 1.00     Years: 20.00     Pack years: 20.00     Quit date:      Years since quittin.7   • Smokeless tobacco: Never Used   Substance and Sexual Activity   • Alcohol use: Yes     Alcohol/week: 2.0 standard drinks     Types: 2 Glasses of wine per week     Comment: occ   • Drug use: No   • Sexual activity: Yes     Partners: Male     Birth control/protection: None       Past Surgical History:   Procedure Laterality Date   • CHOLECYSTECTOMY      Dr. EDER Alba   • COLONOSCOPY  2016    polyp, tics, tubular adenoma   • COLONOSCOPY  2019    normal ileum, diverticulosis, NBIH, TAx1   • ENDOSCOPY  2016    gastric ulcer w/clean base, acute gastritis. Leiva's esophagus   • ENDOSCOPY  2019    normal esophagus/duodenum, acute gastritis   • HYSTERECTOMY     • OOPHORECTOMY     • SHOULDER ARTHROSCOPY      Dr. Plunkett   • UPPER GASTROINTESTINAL ENDOSCOPY  2016       The following portions of the patient's history were reviewed and updated as appropriate: problem list, allergies, current medications, past medical history, past family history, past social history and past surgical history.    Review of Systems   Constitutional: Negative for fatigue.   HENT: Negative for congestion.    Eyes: Negative for  "visual disturbance.   Respiratory: Negative for shortness of breath.    Cardiovascular: Negative for chest pain.   Gastrointestinal: Negative for blood in stool.   Endocrine: Negative.    Genitourinary: Negative.    Musculoskeletal: Negative for arthralgias.   Skin: Negative for rash.   Allergic/Immunologic: Negative for environmental allergies.   Neurological: Negative for headache.   Hematological: Does not bruise/bleed easily.   Psychiatric/Behavioral: The patient is not nervous/anxious.        Immunization History   Administered Date(s) Administered   • COVID-19 (PFIZER) 03/27/2021, 04/19/2021   • Flu Vaccine Quad PF >36MO 10/14/2020   • FluLaval/Fluarix/Fluzone >6 10/14/2020, 10/14/2021   • Hepatitis A 05/05/2018   • Influenza Quad Vaccine (Inpatient) 09/28/2019   • Tdap 01/23/2020       Objective   Vitals:    10/14/21 0758   BP: 96/58   Pulse: 64   Resp: 12   Temp: 97.5 °F (36.4 °C)   Weight: 54.4 kg (120 lb)   Height: 165.1 cm (65\")     Body mass index is 19.97 kg/m².  Physical Exam  Vitals reviewed.   Constitutional:       Appearance: Normal appearance. She is well-developed and normal weight.   HENT:      Head: Normocephalic and atraumatic.      Right Ear: Tympanic membrane, ear canal and external ear normal.      Left Ear: Tympanic membrane, ear canal and external ear normal.      Nose: Nose normal.      Mouth/Throat:      Mouth: Mucous membranes are moist.      Pharynx: Oropharynx is clear. No oropharyngeal exudate or posterior oropharyngeal erythema.   Eyes:      General: No scleral icterus.     Extraocular Movements: Extraocular movements intact.      Conjunctiva/sclera: Conjunctivae normal.      Pupils: Pupils are equal, round, and reactive to light.   Neck:      Thyroid: No thyromegaly.      Vascular: No carotid bruit.   Cardiovascular:      Rate and Rhythm: Normal rate and regular rhythm.      Heart sounds: Normal heart sounds. No murmur heard.      Pulmonary:      Effort: Pulmonary effort is normal. "      Breath sounds: Normal breath sounds.   Abdominal:      General: Bowel sounds are normal. There is no distension.      Palpations: Abdomen is soft.      Tenderness: There is no abdominal tenderness.   Genitourinary:     Comments: P/P and CBE with GYN.   Musculoskeletal:      Cervical back: Neck supple.      Comments: Ambulates without assistance; no clubbing, cyanosis or edema.    Lymphadenopathy:      Cervical: No cervical adenopathy.   Skin:     General: Skin is warm and dry.   Neurological:      Mental Status: She is alert.   Psychiatric:         Mood and Affect: Mood normal.         Behavior: Behavior normal.           ECG 12 Lead    Date/Time: 10/14/2021 8:36 AM  Performed by: Minda Sanchez APRN  Authorized by: Minda Sanchez APRN   Previous ECG: no previous ECG available  Rhythm: sinus rhythm  Rate: normal  BPM: 66  ST Segments: ST segments normal  T Waves: T waves normal  QRS axis: normal    Clinical impression: normal ECG            Assessment/Plan   Diagnoses and all orders for this visit:    1. Annual physical exam (Primary)  Comments:  150 minutes aerobic exercise weekly advised. SG at local pharmacy. Influenza vaccination today.   Orders:  -     Comprehensive Metabolic Panel; Future  -     Lipid Panel With / Chol / HDL Ratio; Future  -     ECG 12 Lead    2. Family history of cerebral aneurysm  Comments:  1st degree relative father with 2 cerebral aneurysms. Screening advised.   Orders:  -     CT Angiogram Head With Contrast; Future    3. Screening for lung cancer  Comments:  Former smoker with 20 pk-yr history; quit 5 years ago. Schedule low dose chest CT as below.   Orders:  -      CT Chest Low Dose Cancer Screening WO; Future    4. Family history of MI (myocardial infarction)  Comments:  EKG today reviewed. Continue 150 minutes weekly aerobic activity. Quit smoking 5 years ago. Weight is good. Continue healthy diet.   Orders:  -     ECG 12 Lead  -     CT Cardiac Calcium Score  Without Dye; Future    5. Need for influenza vaccination  -     FluLaval/Fluarix/Fluzone >6 Months (9112-9583)    6. COVID-19 vaccine series completed    Records reviewed include previous OV with myself as well as labs.     Return in about 1 year (around 10/14/2022) for Annual physical, Lab B4 FUP.

## 2021-10-12 DIAGNOSIS — Z00.00 HEALTH CARE MAINTENANCE: Primary | ICD-10-CM

## 2021-10-12 DIAGNOSIS — Z11.59 NEED FOR HEPATITIS C SCREENING TEST: ICD-10-CM

## 2021-10-14 ENCOUNTER — OFFICE VISIT (OUTPATIENT)
Dept: INTERNAL MEDICINE | Facility: CLINIC | Age: 56
End: 2021-10-14

## 2021-10-14 VITALS
DIASTOLIC BLOOD PRESSURE: 58 MMHG | BODY MASS INDEX: 19.99 KG/M2 | RESPIRATION RATE: 12 BRPM | WEIGHT: 120 LBS | HEART RATE: 64 BPM | TEMPERATURE: 97.5 F | HEIGHT: 65 IN | SYSTOLIC BLOOD PRESSURE: 96 MMHG

## 2021-10-14 DIAGNOSIS — Z82.49 FAMILY HISTORY OF MI (MYOCARDIAL INFARCTION): ICD-10-CM

## 2021-10-14 DIAGNOSIS — Z12.2 SCREENING FOR LUNG CANCER: ICD-10-CM

## 2021-10-14 DIAGNOSIS — Z82.49 FAMILY HISTORY OF CEREBRAL ANEURYSM: ICD-10-CM

## 2021-10-14 DIAGNOSIS — Z23 NEED FOR INFLUENZA VACCINATION: ICD-10-CM

## 2021-10-14 DIAGNOSIS — Z00.00 ANNUAL PHYSICAL EXAM: Primary | ICD-10-CM

## 2021-10-14 DIAGNOSIS — Z92.29 COVID-19 VACCINE SERIES COMPLETED: ICD-10-CM

## 2021-10-14 LAB
ALBUMIN SERPL-MCNC: 4.3 G/DL (ref 3.8–4.9)
ALBUMIN/GLOB SERPL: 2 {RATIO} (ref 1.2–2.2)
ALP SERPL-CCNC: 69 IU/L (ref 44–121)
ALT SERPL-CCNC: 11 IU/L (ref 0–32)
AST SERPL-CCNC: 23 IU/L (ref 0–40)
BILIRUB SERPL-MCNC: 0.6 MG/DL (ref 0–1.2)
BUN SERPL-MCNC: 13 MG/DL (ref 6–24)
BUN/CREAT SERPL: 22 (ref 9–23)
CALCIUM SERPL-MCNC: 9.2 MG/DL (ref 8.7–10.2)
CHLORIDE SERPL-SCNC: 102 MMOL/L (ref 96–106)
CHOLEST SERPL-MCNC: 154 MG/DL (ref 100–199)
CHOLEST/HDLC SERPL: 2.5 RATIO (ref 0–4.4)
CO2 SERPL-SCNC: 27 MMOL/L (ref 20–29)
CREAT SERPL-MCNC: 0.58 MG/DL (ref 0.57–1)
GLOBULIN SER CALC-MCNC: 2.1 G/DL (ref 1.5–4.5)
GLUCOSE SERPL-MCNC: 84 MG/DL (ref 65–99)
HCV AB S/CO SERPL IA: <0.1 S/CO RATIO (ref 0–0.9)
HDLC SERPL-MCNC: 62 MG/DL
LDLC SERPL CALC-MCNC: 80 MG/DL (ref 0–99)
POTASSIUM SERPL-SCNC: 4.6 MMOL/L (ref 3.5–5.2)
PROT SERPL-MCNC: 6.4 G/DL (ref 6–8.5)
SODIUM SERPL-SCNC: 141 MMOL/L (ref 134–144)
TRIGL SERPL-MCNC: 57 MG/DL (ref 0–149)
VLDLC SERPL CALC-MCNC: 12 MG/DL (ref 5–40)

## 2021-10-14 PROCEDURE — 90686 IIV4 VACC NO PRSV 0.5 ML IM: CPT | Performed by: NURSE PRACTITIONER

## 2021-10-14 PROCEDURE — 99396 PREV VISIT EST AGE 40-64: CPT | Performed by: NURSE PRACTITIONER

## 2021-10-14 PROCEDURE — 90471 IMMUNIZATION ADMIN: CPT | Performed by: NURSE PRACTITIONER

## 2021-10-14 PROCEDURE — 93000 ELECTROCARDIOGRAM COMPLETE: CPT | Performed by: NURSE PRACTITIONER

## 2021-11-16 ENCOUNTER — APPOINTMENT (OUTPATIENT)
Dept: CT IMAGING | Facility: HOSPITAL | Age: 56
End: 2021-11-16

## 2021-11-16 ENCOUNTER — HOSPITAL ENCOUNTER (OUTPATIENT)
Dept: CT IMAGING | Facility: HOSPITAL | Age: 56
Discharge: HOME | End: 2021-11-16
Admitting: NURSE PRACTITIONER

## 2021-11-16 DIAGNOSIS — Z82.49 FAMILY HISTORY OF MI (MYOCARDIAL INFARCTION): ICD-10-CM

## 2021-11-16 DIAGNOSIS — Z12.2 SCREENING FOR LUNG CANCER: ICD-10-CM

## 2021-11-16 DIAGNOSIS — Z82.49 FAMILY HISTORY OF CEREBRAL ANEURYSM: ICD-10-CM

## 2021-11-16 PROCEDURE — 25010000002 IOPAMIDOL 61 % SOLUTION: Performed by: NURSE PRACTITIONER

## 2021-11-16 PROCEDURE — 71271 CT THORAX LUNG CANCER SCR C-: CPT

## 2021-11-16 PROCEDURE — 70496 CT ANGIOGRAPHY HEAD: CPT

## 2021-11-16 PROCEDURE — 75571 CT HRT W/O DYE W/CA TEST: CPT

## 2021-11-16 RX ADMIN — IOPAMIDOL 94 ML: 612 INJECTION, SOLUTION INTRAVENOUS at 15:40

## 2021-11-17 DIAGNOSIS — I67.1 ANEURYSM OF OPHTHALMIC ARTERY: Primary | ICD-10-CM

## 2021-12-01 ENCOUNTER — OFFICE VISIT (OUTPATIENT)
Dept: INTERNAL MEDICINE | Facility: CLINIC | Age: 56
End: 2021-12-01

## 2021-12-01 VITALS
RESPIRATION RATE: 16 BRPM | WEIGHT: 120 LBS | HEART RATE: 72 BPM | TEMPERATURE: 96.3 F | DIASTOLIC BLOOD PRESSURE: 82 MMHG | HEIGHT: 65 IN | BODY MASS INDEX: 19.99 KG/M2 | SYSTOLIC BLOOD PRESSURE: 120 MMHG

## 2021-12-01 DIAGNOSIS — I31.39 PERICARDIAL EFFUSION: ICD-10-CM

## 2021-12-01 DIAGNOSIS — R59.0 MEDIASTINAL LYMPHADENOPATHY: Primary | ICD-10-CM

## 2021-12-01 DIAGNOSIS — J43.9 PULMONARY EMPHYSEMA, UNSPECIFIED EMPHYSEMA TYPE (HCC): ICD-10-CM

## 2021-12-01 DIAGNOSIS — Z00.00 HEALTHCARE MAINTENANCE: ICD-10-CM

## 2021-12-01 PROCEDURE — 99214 OFFICE O/P EST MOD 30 MIN: CPT | Performed by: NURSE PRACTITIONER

## 2021-12-01 RX ORDER — CYCLOSPORINE 0.5 MG/ML
1 EMULSION OPHTHALMIC 2 TIMES DAILY
COMMUNITY
Start: 2021-09-16

## 2021-12-28 ENCOUNTER — TRANSCRIBE ORDERS (OUTPATIENT)
Dept: ADMINISTRATIVE | Facility: HOSPITAL | Age: 56
End: 2021-12-28

## 2021-12-28 DIAGNOSIS — Z01.818 OTHER SPECIFIED PRE-OPERATIVE EXAMINATION: Primary | ICD-10-CM

## 2022-01-03 ENCOUNTER — LAB (OUTPATIENT)
Dept: LAB | Facility: HOSPITAL | Age: 57
End: 2022-01-03

## 2022-01-03 DIAGNOSIS — Z01.818 OTHER SPECIFIED PRE-OPERATIVE EXAMINATION: ICD-10-CM

## 2022-01-03 LAB — SARS-COV-2 ORF1AB RESP QL NAA+PROBE: NOT DETECTED

## 2022-01-03 PROCEDURE — C9803 HOPD COVID-19 SPEC COLLECT: HCPCS

## 2022-01-03 PROCEDURE — U0004 COV-19 TEST NON-CDC HGH THRU: HCPCS

## 2022-01-04 ENCOUNTER — HOSPITAL ENCOUNTER (OUTPATIENT)
Dept: CARDIOLOGY | Facility: HOSPITAL | Age: 57
Discharge: HOME | End: 2022-01-04
Admitting: NURSE PRACTITIONER

## 2022-01-04 PROCEDURE — 93306 TTE W/DOPPLER COMPLETE: CPT | Performed by: INTERNAL MEDICINE

## 2022-01-04 PROCEDURE — 93306 TTE W/DOPPLER COMPLETE: CPT

## 2022-01-07 ENCOUNTER — APPOINTMENT (OUTPATIENT)
Dept: RESPIRATORY THERAPY | Facility: HOSPITAL | Age: 57
End: 2022-01-07

## 2022-01-10 ENCOUNTER — OFFICE VISIT (OUTPATIENT)
Dept: NEUROSURGERY | Facility: CLINIC | Age: 57
End: 2022-01-10

## 2022-01-10 VITALS
HEART RATE: 82 BPM | HEIGHT: 64 IN | SYSTOLIC BLOOD PRESSURE: 118 MMHG | TEMPERATURE: 98 F | BODY MASS INDEX: 21.24 KG/M2 | WEIGHT: 124.4 LBS | DIASTOLIC BLOOD PRESSURE: 70 MMHG

## 2022-01-10 DIAGNOSIS — I67.1 CEREBRAL ANEURYSM, NONRUPTURED: Primary | ICD-10-CM

## 2022-01-10 PROCEDURE — 99204 OFFICE O/P NEW MOD 45 MIN: CPT | Performed by: NEUROLOGICAL SURGERY

## 2022-01-14 ENCOUNTER — PATIENT ROUNDING (BHMG ONLY) (OUTPATIENT)
Dept: NEUROSURGERY | Facility: CLINIC | Age: 57
End: 2022-01-14

## 2022-01-20 DIAGNOSIS — I31.39 PERICARDIAL EFFUSION: Primary | ICD-10-CM

## 2022-01-31 ENCOUNTER — TRANSCRIBE ORDERS (OUTPATIENT)
Dept: ADMINISTRATIVE | Facility: HOSPITAL | Age: 57
End: 2022-01-31

## 2022-01-31 DIAGNOSIS — Z01.818 OTHER SPECIFIED PRE-OPERATIVE EXAMINATION: Primary | ICD-10-CM

## 2022-02-09 ENCOUNTER — HOSPITAL ENCOUNTER (OUTPATIENT)
Dept: CT IMAGING | Facility: HOSPITAL | Age: 57
Discharge: HOME | End: 2022-02-09
Admitting: NEUROLOGICAL SURGERY

## 2022-02-09 DIAGNOSIS — I67.1 CEREBRAL ANEURYSM, NONRUPTURED: ICD-10-CM

## 2022-02-09 LAB — CREAT BLDA-MCNC: 0.5 MG/DL (ref 0.6–1.3)

## 2022-02-09 PROCEDURE — 82565 ASSAY OF CREATININE: CPT

## 2022-02-09 PROCEDURE — 70496 CT ANGIOGRAPHY HEAD: CPT

## 2022-02-09 PROCEDURE — 0 IOPAMIDOL PER 1 ML: Performed by: NEUROLOGICAL SURGERY

## 2022-02-09 PROCEDURE — 70498 CT ANGIOGRAPHY NECK: CPT

## 2022-02-09 RX ADMIN — IOPAMIDOL 95 ML: 755 INJECTION, SOLUTION INTRAVENOUS at 07:17

## 2022-02-14 ENCOUNTER — OFFICE VISIT (OUTPATIENT)
Dept: NEUROSURGERY | Facility: CLINIC | Age: 57
End: 2022-02-14

## 2022-02-14 VITALS
TEMPERATURE: 97.8 F | HEART RATE: 75 BPM | BODY MASS INDEX: 21.17 KG/M2 | OXYGEN SATURATION: 99 % | WEIGHT: 124 LBS | RESPIRATION RATE: 16 BRPM | DIASTOLIC BLOOD PRESSURE: 70 MMHG | HEIGHT: 64 IN | SYSTOLIC BLOOD PRESSURE: 100 MMHG

## 2022-02-14 DIAGNOSIS — I67.1 CEREBRAL ANEURYSM, NONRUPTURED: Primary | ICD-10-CM

## 2022-02-14 PROCEDURE — 99214 OFFICE O/P EST MOD 30 MIN: CPT | Performed by: NEUROLOGICAL SURGERY

## 2022-02-18 ENCOUNTER — APPOINTMENT (OUTPATIENT)
Dept: RESPIRATORY THERAPY | Facility: HOSPITAL | Age: 57
End: 2022-02-18

## 2022-02-20 NOTE — PROGRESS NOTES
Subjective   Chief Complaint   Patient presents with   • Results     echo   • Ear Fullness     left       History of Present Illness   56 y.o. female presents for follow up regarding echo in January showing trivial pericardial effusion. She is feeling good. Denies CP or dyspnea. Had to cancel PFTs due to COVID exposure ( positive). Notes fullness in left ear. Not using Flonase regularly. No pain, fever, chills or drainage.     She has had follow up and repeat imaging with Dr. Nieto with plans for MRI and follow up in 6 months. Chest CT to be scheduled to be repeated in March given mediastinal lymphadenopathy on low dose screening. Recent echo showed trivial pericardial effusion.   Repeat chest CT in 3-6 months.      Patient Active Problem List   Diagnosis   • ASCUS with positive high risk HPV cervical       Allergies   Allergen Reactions   • Amitiza [Lubiprostone] Other (See Comments)     Migraines   • Codeine Hives   • Pantoprazole Nausea And Vomiting       Current Outpatient Medications on File Prior to Visit   Medication Sig Dispense Refill   • Calcium-Magnesium-Vitamin D (CALCIUM 500 PO) Take  by mouth 2 (Two) Times a Day.     • cholecalciferol (VITAMIN D3) 25 MCG (1000 UT) tablet Take 1,000 Units by mouth Daily.     • diphenhydrAMINE (BENADRYL) 25 mg Take 25 mg by mouth every 6 (six) hours as needed for itching.     • estradiol (VIVELLE-DOT) 0.025 MG/24HR patch PLACE 1 PATCH ON THE SKIN AS DIRECTED BY PROVIDER TWICE A WEEK 24 patch 3   • linaclotide (Linzess) 145 MCG capsule capsule Take 1 capsule by mouth Every Morning Before Breakfast. 90 capsule 3   • Omega-3 Fatty Acids (FISH OIL) 1000 MG capsule capsule Take  by mouth Daily With Breakfast.     • Restasis 0.05 % ophthalmic emulsion Administer 1 drop to both eyes 2 (Two) Times a Day.     • [DISCONTINUED] amoxicillin (AMOXIL) 500 MG capsule      • [DISCONTINUED] estradiol (VIVELLE-DOT) 0.025 MG/24HR patch Place 1 patch on the skin as directed by  provider 2 (Two) Times a Week. 8 patch 12     No current facility-administered medications on file prior to visit.       Past Medical History:   Diagnosis Date   • Leiva esophagus    • Chest pain 2016   • Dyspepsia    • Gastric ulcer    • GERD (gastroesophageal reflux disease)    • Headache    • Irritable bowel syndrome    • Tubular adenoma of colon        Family History   Problem Relation Age of Onset   • Heart disease Mother    • Inflammatory bowel disease Mother    • Colon cancer Mother    • Colon polyps Mother    • Liver cancer Mother    • Ulcerative colitis Mother    • Cerebral aneurysm Father    • Crohn's disease Brother    • Colon polyps Brother    • Crohn's disease Cousin    • Crohn's disease Cousin    • Heart attack Brother 48   • Heart attack Brother 40       Social History     Socioeconomic History   • Marital status:    Tobacco Use   • Smoking status: Former Smoker     Packs/day: 1.00     Years: 20.00     Pack years: 20.00     Quit date:      Years since quittin.1   • Smokeless tobacco: Never Used   Vaping Use   • Vaping Use: Never used   Substance and Sexual Activity   • Alcohol use: Yes     Alcohol/week: 2.0 standard drinks     Types: 2 Glasses of wine per week     Comment: occ   • Drug use: No   • Sexual activity: Yes     Partners: Male     Birth control/protection: None       Past Surgical History:   Procedure Laterality Date   • CHOLECYSTECTOMY      Dr. EDER Alba   • COLONOSCOPY  2016    polyp, tics, tubular adenoma   • COLONOSCOPY  2019    normal ileum, diverticulosis, NBIH, TAx1   • ENDOSCOPY  2016    gastric ulcer w/clean base, acute gastritis. Leiva's esophagus   • ENDOSCOPY  2019    normal esophagus/duodenum, acute gastritis   • HYSTERECTOMY     • OOPHORECTOMY     • SHOULDER ARTHROSCOPY      Dr. Plunkett   • UPPER GASTROINTESTINAL ENDOSCOPY  2016       The following portions of the patient's history were reviewed and updated as appropriate: problem  "list, allergies, current medications, past medical history and past social history.    Review of Systems    Immunization History   Administered Date(s) Administered   • COVID-19 (PFIZER) PURPLE CAP 03/27/2021, 04/19/2021, 12/07/2021   • Flu Vaccine Quad PF >36MO 10/14/2020   • FluLaval/Fluarix/Fluzone >6 10/14/2020, 10/14/2021   • Hepatitis A 05/05/2018   • Influenza Quad Vaccine (Inpatient) 09/28/2019   • Pneumococcal Polysaccharide (PPSV23) 02/21/2022   • Tdap 01/23/2020       Objective   Vitals:    02/21/22 1109   BP: 116/74   Pulse: 80   Resp: 14   Temp: 97.7 °F (36.5 °C)   Weight: 55.3 kg (122 lb)   Height: 162.6 cm (64\")     Body mass index is 20.94 kg/m².  Physical Exam  Vitals reviewed.   Constitutional:       Appearance: Normal appearance. She is well-developed and normal weight.   HENT:      Head: Normocephalic and atraumatic.      Right Ear: Tympanic membrane, ear canal and external ear normal.      Left Ear: Tympanic membrane, ear canal and external ear normal.      Nose: Nose normal.      Mouth/Throat:      Mouth: Mucous membranes are moist.      Pharynx: Oropharynx is clear. No oropharyngeal exudate or posterior oropharyngeal erythema.   Cardiovascular:      Rate and Rhythm: Normal rate and regular rhythm.      Heart sounds: Normal heart sounds, S1 normal and S2 normal.   Pulmonary:      Effort: Pulmonary effort is normal.      Breath sounds: Normal breath sounds.   Abdominal:      General: Abdomen is flat. Bowel sounds are normal. There is no distension.      Palpations: Abdomen is soft.      Tenderness: There is no abdominal tenderness.   Musculoskeletal:      Cervical back: Neck supple.      Comments: Ambulates without assistance; no clubbing, cyanosis or edema.    Lymphadenopathy:      Cervical: No cervical adenopathy.   Skin:     General: Skin is warm and dry.   Neurological:      Mental Status: She is alert.   Psychiatric:         Mood and Affect: Mood normal.         Behavior: Behavior normal. "         Procedures    Assessment/Plan   Diagnoses and all orders for this visit:    1. Pericardial effusion (Primary)  Comments:  Trivial per echo 01/2022; lab work up unrevealing.     2. Mediastinal lymphadenopathy  Comments:  Repeat chest CT scheduled in March. Quit smoking in 2014.    3. Emphysematous changes on imaging  Comments:  Reschedule PFTs.     4. 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 Followed by Dr. Nieto with MRI scheduled in 6 months.     5. Need for pneumococcal vaccination  -     Pneumococcal Polysaccharide Vaccine 23-Valent (PPSV23) Greater Than or Equal To 3yo Subcutaneous / IM    6. Healthcare maintenance  Comments:  Had SG in the past--she will send dates.   Orders:  -     Comprehensive Metabolic Panel; Future  -     Lipid Panel With / Chol / HDL Ratio; Future    Records reviewed include previous OV with myself as well as labs, imaging and echo.     Return in about 1 year (around 2/21/2023) for Lab B4 FUP, Annual physical.

## 2022-02-21 ENCOUNTER — OFFICE VISIT (OUTPATIENT)
Dept: INTERNAL MEDICINE | Facility: CLINIC | Age: 57
End: 2022-02-21

## 2022-02-21 VITALS
TEMPERATURE: 97.7 F | DIASTOLIC BLOOD PRESSURE: 74 MMHG | HEART RATE: 80 BPM | RESPIRATION RATE: 14 BRPM | WEIGHT: 122 LBS | SYSTOLIC BLOOD PRESSURE: 116 MMHG | BODY MASS INDEX: 20.83 KG/M2 | HEIGHT: 64 IN

## 2022-02-21 DIAGNOSIS — R59.0 MEDIASTINAL LYMPHADENOPATHY: ICD-10-CM

## 2022-02-21 DIAGNOSIS — I67.1 ANEURYSM OF OPHTHALMIC ARTERY: Chronic | ICD-10-CM

## 2022-02-21 DIAGNOSIS — Z23 NEED FOR PNEUMOCOCCAL VACCINATION: ICD-10-CM

## 2022-02-21 DIAGNOSIS — Z00.00 HEALTHCARE MAINTENANCE: ICD-10-CM

## 2022-02-21 DIAGNOSIS — J43.9 PULMONARY EMPHYSEMA, UNSPECIFIED EMPHYSEMA TYPE: ICD-10-CM

## 2022-02-21 DIAGNOSIS — I31.39 PERICARDIAL EFFUSION: Primary | ICD-10-CM

## 2022-02-21 PROCEDURE — 99214 OFFICE O/P EST MOD 30 MIN: CPT | Performed by: NURSE PRACTITIONER

## 2022-02-21 PROCEDURE — 90471 IMMUNIZATION ADMIN: CPT | Performed by: NURSE PRACTITIONER

## 2022-02-21 PROCEDURE — 90732 PPSV23 VACC 2 YRS+ SUBQ/IM: CPT | Performed by: NURSE PRACTITIONER

## 2022-03-09 LAB
AORTIC DIMENSIONLESS INDEX: 0.8 (DI)
BH CV ECHO MEAS - ACS: 2.1 CM
BH CV ECHO MEAS - AO MAX PG (FULL): 2.3 MMHG
BH CV ECHO MEAS - AO MAX PG: 6.2 MMHG
BH CV ECHO MEAS - AO MEAN PG (FULL): 1.1 MMHG
BH CV ECHO MEAS - AO MEAN PG: 2.9 MMHG
BH CV ECHO MEAS - AO ROOT AREA (BSA CORRECTED): 2
BH CV ECHO MEAS - AO ROOT AREA: 7.4 CM^2
BH CV ECHO MEAS - AO ROOT DIAM: 3.1 CM
BH CV ECHO MEAS - AO V2 MAX: 124.1 CM/SEC
BH CV ECHO MEAS - AO V2 MEAN: 78.8 CM/SEC
BH CV ECHO MEAS - AO V2 VTI: 23.7 CM
BH CV ECHO MEAS - AVA(I,A): 3.1 CM^2
BH CV ECHO MEAS - AVA(I,D): 3.1 CM^2
BH CV ECHO MEAS - AVA(V,A): 3 CM^2
BH CV ECHO MEAS - AVA(V,D): 3 CM^2
BH CV ECHO MEAS - BSA(HAYCOCK): 1.6 M^2
BH CV ECHO MEAS - BSA: 1.6 M^2
BH CV ECHO MEAS - BZI_BMI: 20.6 KILOGRAMS/M^2
BH CV ECHO MEAS - BZI_METRIC_HEIGHT: 162.6 CM
BH CV ECHO MEAS - BZI_METRIC_WEIGHT: 54.4 KG
BH CV ECHO MEAS - EDV(CUBED): 65.9 ML
BH CV ECHO MEAS - EDV(MOD-SP2): 69 ML
BH CV ECHO MEAS - EDV(MOD-SP4): 57 ML
BH CV ECHO MEAS - EDV(TEICH): 71.6 ML
BH CV ECHO MEAS - EF(CUBED): 65.3 %
BH CV ECHO MEAS - EF(MOD-BP): 54.9 %
BH CV ECHO MEAS - EF(MOD-SP2): 56.5 %
BH CV ECHO MEAS - EF(MOD-SP4): 52.6 %
BH CV ECHO MEAS - EF(TEICH): 57.4 %
BH CV ECHO MEAS - ESV(CUBED): 22.9 ML
BH CV ECHO MEAS - ESV(MOD-SP2): 30 ML
BH CV ECHO MEAS - ESV(MOD-SP4): 27 ML
BH CV ECHO MEAS - ESV(TEICH): 30.5 ML
BH CV ECHO MEAS - FS: 29.7 %
BH CV ECHO MEAS - IVS/LVPW: 1
BH CV ECHO MEAS - IVSD: 0.65 CM
BH CV ECHO MEAS - LAT PEAK E' VEL: 9.6 CM/SEC
BH CV ECHO MEAS - LV DIASTOLIC VOL/BSA (35-75): 36.2 ML/M^2
BH CV ECHO MEAS - LV MASS(C)D: 70.9 GRAMS
BH CV ECHO MEAS - LV MASS(C)DI: 45 GRAMS/M^2
BH CV ECHO MEAS - LV MAX PG: 3.9 MMHG
BH CV ECHO MEAS - LV MEAN PG: 1.8 MMHG
BH CV ECHO MEAS - LV SYSTOLIC VOL/BSA (12-30): 17.2 ML/M^2
BH CV ECHO MEAS - LV V1 MAX: 98.5 CM/SEC
BH CV ECHO MEAS - LV V1 MEAN: 62.2 CM/SEC
BH CV ECHO MEAS - LV V1 VTI: 19.4 CM
BH CV ECHO MEAS - LVIDD: 4 CM
BH CV ECHO MEAS - LVIDS: 2.8 CM
BH CV ECHO MEAS - LVLD AP2: 7.8 CM
BH CV ECHO MEAS - LVLD AP4: 7.3 CM
BH CV ECHO MEAS - LVLS AP2: 7.1 CM
BH CV ECHO MEAS - LVLS AP4: 6.5 CM
BH CV ECHO MEAS - LVOT AREA (M): 3.8 CM^2
BH CV ECHO MEAS - LVOT AREA: 3.7 CM^2
BH CV ECHO MEAS - LVOT DIAM: 2.2 CM
BH CV ECHO MEAS - LVPWD: 0.63 CM
BH CV ECHO MEAS - MED PEAK E' VEL: 7.3 CM/SEC
BH CV ECHO MEAS - MV A DUR: 0.13 SEC
BH CV ECHO MEAS - MV A MAX VEL: 72.4 CM/SEC
BH CV ECHO MEAS - MV DEC SLOPE: 303.9 CM/SEC^2
BH CV ECHO MEAS - MV DEC TIME: 272 SEC
BH CV ECHO MEAS - MV E MAX VEL: 97.3 CM/SEC
BH CV ECHO MEAS - MV E/A: 1.3
BH CV ECHO MEAS - MV MAX PG: 3.3 MMHG
BH CV ECHO MEAS - MV MEAN PG: 1.6 MMHG
BH CV ECHO MEAS - MV P1/2T MAX VEL: 94.5 CM/SEC
BH CV ECHO MEAS - MV P1/2T: 91 MSEC
BH CV ECHO MEAS - MV V2 MAX: 90.7 CM/SEC
BH CV ECHO MEAS - MV V2 MEAN: 60.4 CM/SEC
BH CV ECHO MEAS - MV V2 VTI: 25.2 CM
BH CV ECHO MEAS - MVA P1/2T LCG: 2.3 CM^2
BH CV ECHO MEAS - MVA(P1/2T): 2.4 CM^2
BH CV ECHO MEAS - MVA(VTI): 2.9 CM^2
BH CV ECHO MEAS - PA ACC TIME: 0.17 SEC
BH CV ECHO MEAS - PA MAX PG (FULL): 0.38 MMHG
BH CV ECHO MEAS - PA MAX PG: 2.8 MMHG
BH CV ECHO MEAS - PA PR(ACCEL): 0.57 MMHG
BH CV ECHO MEAS - PA V2 MAX: 83.5 CM/SEC
BH CV ECHO MEAS - PULM A REVS DUR: 0.13 SEC
BH CV ECHO MEAS - PULM A REVS VEL: 28.7 CM/SEC
BH CV ECHO MEAS - PULM DIAS VEL: 60.8 CM/SEC
BH CV ECHO MEAS - PULM S/D: 0.85
BH CV ECHO MEAS - PULM SYS VEL: 51.4 CM/SEC
BH CV ECHO MEAS - PVA(V,A): 0.96 CM^2
BH CV ECHO MEAS - PVA(V,D): 0.96 CM^2
BH CV ECHO MEAS - QP/QS: 0.23
BH CV ECHO MEAS - RAP SYSTOLE: 3 MMHG
BH CV ECHO MEAS - RV MAX PG: 2.4 MMHG
BH CV ECHO MEAS - RV MEAN PG: 1.4 MMHG
BH CV ECHO MEAS - RV V1 MAX: 77.6 CM/SEC
BH CV ECHO MEAS - RV V1 MEAN: 55.7 CM/SEC
BH CV ECHO MEAS - RV V1 VTI: 16.5 CM
BH CV ECHO MEAS - RVOT AREA: 1 CM^2
BH CV ECHO MEAS - RVOT DIAM: 1.1 CM
BH CV ECHO MEAS - RVSP: 19.4 MMHG
BH CV ECHO MEAS - SI(AO): 112.1 ML/M^2
BH CV ECHO MEAS - SI(CUBED): 27.3 ML/M^2
BH CV ECHO MEAS - SI(LVOT): 46.2 ML/M^2
BH CV ECHO MEAS - SI(MOD-SP2): 24.8 ML/M^2
BH CV ECHO MEAS - SI(MOD-SP4): 19.1 ML/M^2
BH CV ECHO MEAS - SI(TEICH): 26.1 ML/M^2
BH CV ECHO MEAS - SV(AO): 176.5 ML
BH CV ECHO MEAS - SV(CUBED): 43 ML
BH CV ECHO MEAS - SV(LVOT): 72.7 ML
BH CV ECHO MEAS - SV(MOD-SP2): 39 ML
BH CV ECHO MEAS - SV(MOD-SP4): 30 ML
BH CV ECHO MEAS - SV(RVOT): 17 ML
BH CV ECHO MEAS - SV(TEICH): 41.1 ML
BH CV ECHO MEAS - TAPSE (>1.6): 1.8 CM
BH CV ECHO MEAS - TR MAX VEL: 202.7 CM/SEC
BH CV ECHO MEASUREMENTS AVERAGE E/E' RATIO: 11.51
BH CV VAS BP RIGHT ARM: NORMAL MMHG
BH CV XLRA - RV BASE: 2.2 CM
BH CV XLRA - RV LENGTH: 6.7 CM
BH CV XLRA - RV MID: 2.2 CM
BH CV XLRA - TDI S': 8.4 CM/SEC
LEFT ATRIUM VOLUME INDEX: 14 ML/M2
MAXIMAL PREDICTED HEART RATE: 165 BPM
SINUS: 2.7 CM
STRESS TARGET HR: 140 BPM

## 2022-04-19 RX ORDER — ESTRADIOL 0.03 MG/D
FILM, EXTENDED RELEASE TRANSDERMAL
Qty: 8 PATCH | Refills: 11 | Status: SHIPPED | OUTPATIENT
Start: 2022-04-19 | End: 2023-01-09 | Stop reason: SDUPTHER

## 2022-05-23 ENCOUNTER — OFFICE VISIT (OUTPATIENT)
Dept: INTERNAL MEDICINE | Facility: CLINIC | Age: 57
End: 2022-05-23

## 2022-05-23 VITALS
SYSTOLIC BLOOD PRESSURE: 122 MMHG | BODY MASS INDEX: 20.83 KG/M2 | TEMPERATURE: 98.4 F | DIASTOLIC BLOOD PRESSURE: 80 MMHG | HEIGHT: 64 IN | WEIGHT: 122 LBS

## 2022-05-23 DIAGNOSIS — J06.9 UPPER RESPIRATORY TRACT INFECTION, UNSPECIFIED TYPE: Primary | ICD-10-CM

## 2022-05-23 PROCEDURE — 99214 OFFICE O/P EST MOD 30 MIN: CPT | Performed by: PHYSICIAN ASSISTANT

## 2022-05-23 RX ORDER — POLYMYXIN B SULFATE AND TRIMETHOPRIM 1; 10000 MG/ML; [USP'U]/ML
1 SOLUTION OPHTHALMIC EVERY 4 HOURS
Qty: 10 ML | Refills: 0 | Status: SHIPPED | OUTPATIENT
Start: 2022-05-23 | End: 2022-11-02 | Stop reason: HOSPADM

## 2022-05-23 RX ORDER — BENZONATATE 100 MG/1
100 CAPSULE ORAL 3 TIMES DAILY PRN
Qty: 30 CAPSULE | Refills: 0 | Status: SHIPPED | OUTPATIENT
Start: 2022-05-23 | End: 2022-10-27

## 2022-05-23 RX ORDER — CEFDINIR 300 MG/1
300 CAPSULE ORAL 2 TIMES DAILY
Qty: 14 CAPSULE | Refills: 0 | Status: SHIPPED | OUTPATIENT
Start: 2022-05-23 | End: 2022-05-30

## 2022-05-23 NOTE — PROGRESS NOTES
Subjective   Chief Complaint   Patient presents with   • Sore Throat   Pt in mask and I was in N95    History of Present Illness   Pt has had respiratory symptoms for almost 2 weeks. Sore throat was worst sx, went to [facility] a week ago. Was told she had strep and treated with AMoxicilli 500 mg for  Days. She was not tested. She had done a home covid test that was negative. Her sore throat is better, but not resolved.  Continues to have productive cough, no fever or chills. Has had some ear pain. Woke up this morning with drainage from both eyes and matting.      Patient Active Problem List   Diagnosis   • ASCUS with positive high risk HPV cervical       Allergies   Allergen Reactions   • Amitiza [Lubiprostone] Other (See Comments)     Migraines   • Codeine Hives   • Pantoprazole Nausea And Vomiting       Current Outpatient Medications on File Prior to Visit   Medication Sig Dispense Refill   • Calcium-Magnesium-Vitamin D (CALCIUM 500 PO) Take  by mouth 2 (Two) Times a Day.     • cholecalciferol (VITAMIN D3) 25 MCG (1000 UT) tablet Take 1,000 Units by mouth Daily.     • diphenhydrAMINE (BENADRYL) 25 mg Take 25 mg by mouth every 6 (six) hours as needed for itching.     • estradiol (VIVELLE-DOT) 0.025 MG/24HR patch PLACE 1 PATCH ON THE SKIN AS DIRECTED BY PROVIDER TWICE A WEEK 8 patch 11   • linaclotide (Linzess) 145 MCG capsule capsule Take 1 capsule by mouth Every Morning Before Breakfast. 90 capsule 3   • Omega-3 Fatty Acids (FISH OIL) 1000 MG capsule capsule Take  by mouth Daily With Breakfast.     • Restasis 0.05 % ophthalmic emulsion Administer 1 drop to both eyes 2 (Two) Times a Day.       No current facility-administered medications on file prior to visit.       Past Medical History:   Diagnosis Date   • Leiva esophagus    • Chest pain 2016   • Dyspepsia    • Gastric ulcer    • GERD (gastroesophageal reflux disease)    • Headache    • Irritable bowel syndrome    • Tubular adenoma of colon        Family  History   Problem Relation Age of Onset   • Heart disease Mother    • Inflammatory bowel disease Mother    • Colon cancer Mother    • Colon polyps Mother    • Liver cancer Mother    • Ulcerative colitis Mother    • Cerebral aneurysm Father    • Crohn's disease Brother    • Colon polyps Brother    • Crohn's disease Cousin    • Crohn's disease Cousin    • Heart attack Brother 48   • Heart attack Brother 40       Social History     Socioeconomic History   • Marital status:    Tobacco Use   • Smoking status: Former Smoker     Packs/day: 1.00     Years: 20.00     Pack years: 20.00     Quit date:      Years since quittin.3   • Smokeless tobacco: Never Used   Vaping Use   • Vaping Use: Never used   Substance and Sexual Activity   • Alcohol use: Yes     Alcohol/week: 2.0 standard drinks     Types: 2 Glasses of wine per week     Comment: occ   • Drug use: No   • Sexual activity: Yes     Partners: Male     Birth control/protection: None       Past Surgical History:   Procedure Laterality Date   • CHOLECYSTECTOMY      Dr. EDER Alba   • COLONOSCOPY  2016    polyp, tics, tubular adenoma   • COLONOSCOPY  2019    normal ileum, diverticulosis, NBIH, TAx1   • ENDOSCOPY  2016    gastric ulcer w/clean base, acute gastritis. Leiva's esophagus   • ENDOSCOPY  2019    normal esophagus/duodenum, acute gastritis   • HYSTERECTOMY     • OOPHORECTOMY     • SHOULDER ARTHROSCOPY      Dr. Plunkett   • UPPER GASTROINTESTINAL ENDOSCOPY  2016       The following portions of the patient's history were reviewed and updated as appropriate: problem list, allergies, current medications, past medical history, past family history, past social history and past surgical history.    ROS     See HPI    Immunization History   Administered Date(s) Administered   • COVID-19 (PFIZER) PURPLE CAP 2021, 2021, 2021   • Flu Vaccine Quad PF >36MO 10/14/2020   • FluLaval/Fluarix/Fluzone >6 10/14/2020,  "10/14/2021   • Hepatitis A 05/05/2018   • Influenza Quad Vaccine (Inpatient) 09/28/2019   • Pneumococcal Polysaccharide (PPSV23) 02/21/2022   • Tdap 01/23/2020       Objective   Vitals:    05/23/22 1125 05/23/22 1138   BP:  122/80   Temp: 98.4 °F (36.9 °C)    Weight: 55.3 kg (122 lb)    Height: 162.6 cm (64\")      Body mass index is 20.94 kg/m².  Physical Exam  Vitals reviewed.   Constitutional:       Appearance: She is well-developed.   HENT:      Head: Normocephalic and atraumatic.      Right Ear: Ear canal normal.      Left Ear: Ear canal normal.      Mouth/Throat:      Pharynx: Posterior oropharyngeal erythema present.   Eyes:      Comments: Conjunctiva erythematous bilaterally   Cardiovascular:      Rate and Rhythm: Normal rate and regular rhythm.      Heart sounds: Normal heart sounds, S1 normal and S2 normal.   Pulmonary:      Effort: Pulmonary effort is normal.      Breath sounds: Normal breath sounds.   Skin:     General: Skin is warm.   Neurological:      Mental Status: She is alert.   Psychiatric:         Behavior: Behavior normal.           Assessment & Plan   Diagnoses and all orders for this visit:    1. Upper respiratory tract infection, unspecified type (Primary)  -     cefdinir (OMNICEF) 300 MG capsule; Take 1 capsule by mouth 2 (Two) Times a Day for 7 days.  Dispense: 14 capsule; Refill: 0    Other orders  -     benzonatate (Tessalon Perles) 100 MG capsule; Take 1 capsule by mouth 3 (Three) Times a Day As Needed for Cough.  Dispense: 30 capsule; Refill: 0  -     trimethoprim-polymyxin b (Polytrim) 83147-2.1 UNIT/ML-% ophthalmic solution; Administer 1 drop to both eyes Every 4 (Four) Hours.  Dispense: 10 mL; Refill: 0             "

## 2022-06-09 ENCOUNTER — APPOINTMENT (OUTPATIENT)
Dept: BONE DENSITY | Facility: HOSPITAL | Age: 57
End: 2022-06-09

## 2022-06-29 DIAGNOSIS — H53.453 PERIPHERAL VISION LOSS, BILATERAL: ICD-10-CM

## 2022-06-29 DIAGNOSIS — I67.1 CEREBRAL ANEURYSM, NONRUPTURED: Primary | ICD-10-CM

## 2022-07-17 ENCOUNTER — HOSPITAL ENCOUNTER (OUTPATIENT)
Dept: MRI IMAGING | Facility: HOSPITAL | Age: 57
End: 2022-07-17

## 2022-07-17 ENCOUNTER — HOSPITAL ENCOUNTER (OUTPATIENT)
Dept: MRI IMAGING | Facility: HOSPITAL | Age: 57
Discharge: HOME | End: 2022-07-17
Admitting: NEUROLOGICAL SURGERY

## 2022-07-17 DIAGNOSIS — I67.1 CEREBRAL ANEURYSM, NONRUPTURED: ICD-10-CM

## 2022-07-17 DIAGNOSIS — H53.453 PERIPHERAL VISION LOSS, BILATERAL: ICD-10-CM

## 2022-07-17 PROCEDURE — A9577 INJ MULTIHANCE: HCPCS | Performed by: NEUROLOGICAL SURGERY

## 2022-07-17 PROCEDURE — 70553 MRI BRAIN STEM W/O & W/DYE: CPT

## 2022-07-17 PROCEDURE — 0 GADOBENATE DIMEGLUMINE 529 MG/ML SOLUTION: Performed by: NEUROLOGICAL SURGERY

## 2022-07-17 PROCEDURE — 70544 MR ANGIOGRAPHY HEAD W/O DYE: CPT

## 2022-07-17 RX ADMIN — GADOBENATE DIMEGLUMINE 10 ML: 529 INJECTION, SOLUTION INTRAVENOUS at 14:24

## 2022-07-26 ENCOUNTER — TELEPHONE (OUTPATIENT)
Dept: GASTROENTEROLOGY | Facility: CLINIC | Age: 57
End: 2022-07-26

## 2022-07-28 ENCOUNTER — APPOINTMENT (OUTPATIENT)
Dept: MRI IMAGING | Facility: HOSPITAL | Age: 57
End: 2022-07-28

## 2022-08-01 ENCOUNTER — TELEPHONE (OUTPATIENT)
Dept: GASTROENTEROLOGY | Facility: CLINIC | Age: 57
End: 2022-08-01

## 2022-08-01 NOTE — TELEPHONE ENCOUNTER
----- Message from Iliana Reyes sent at 8/1/2022  7:20 AM EDT -----  Regarding: Dalton Dick     I can’t refill my linzee 145 mg out of refills and insurance. Can you provide any assistance ?     Please!

## 2022-08-08 NOTE — PROGRESS NOTES
Subjective   History of Present Illness: Iliana Reyes is a 56 y.o. female is here today for follow-up on cerebral aneurysm. MRI brain/MRA head completed on 7/17/22.  She is doing well today.  No new complaints.  Denies any changes in the frequency or severity of her headaches.  Denies any changes in vision.  Denies any strokelike episodes.  Denies any changes in strength or sensation.  She reports that she has had left-sided headaches for several years.  The headaches occur 2-3 times per week.  The headaches go away with massaging her temporalis muscle.        History of Present Illness    The following portions of the patient's history were reviewed and updated as appropriate: allergies, past family history, past medical history, past social history, past surgical history and problem list.    Past Medical History:   Diagnosis Date   • Leiva esophagus    • Chest pain 2016   • Dyspepsia    • Gastric ulcer    • GERD (gastroesophageal reflux disease)    • Headache    • Irritable bowel syndrome    • Tubular adenoma of colon         Past Surgical History:   Procedure Laterality Date   • CHOLECYSTECTOMY      Dr. EDER Alba   • COLONOSCOPY  05/18/2016    polyp, tics, tubular adenoma   • COLONOSCOPY  06/05/2019    normal ileum, diverticulosis, NBIH, TAx1   • ENDOSCOPY  05/18/2016    gastric ulcer w/clean base, acute gastritis. Leiva's esophagus   • ENDOSCOPY  06/05/2019    normal esophagus/duodenum, acute gastritis   • HYSTERECTOMY     • OOPHORECTOMY     • SHOULDER ARTHROSCOPY      Dr. Plunkett   • UPPER GASTROINTESTINAL ENDOSCOPY  05/18/2016          Current Outpatient Medications:   •  Calcium-Magnesium-Vitamin D (CALCIUM 500 PO), Take  by mouth 2 (Two) Times a Day., Disp: , Rfl:   •  cholecalciferol (VITAMIN D3) 25 MCG (1000 UT) tablet, Take 1,000 Units by mouth Daily., Disp: , Rfl:   •  diphenhydrAMINE (BENADRYL) 25 mg, Take 25 mg by mouth every 6 (six) hours as needed for itching., Disp: , Rfl:   •  estradiol  (VIVELLE-DOT) 0.025 MG/24HR patch, PLACE 1 PATCH ON THE SKIN AS DIRECTED BY PROVIDER TWICE A WEEK, Disp: 8 patch, Rfl: 11  •  linaclotide (Linzess) 145 MCG capsule capsule, Take 1 capsule by mouth Every Morning Before Breakfast., Disp: 90 capsule, Rfl: 3  •  Omega-3 Fatty Acids (FISH OIL) 1000 MG capsule capsule, Take  by mouth Daily With Breakfast., Disp: , Rfl:   •  Restasis 0.05 % ophthalmic emulsion, Administer 1 drop to both eyes 2 (Two) Times a Day., Disp: , Rfl:   •  benzonatate (Tessalon Perles) 100 MG capsule, Take 1 capsule by mouth 3 (Three) Times a Day As Needed for Cough., Disp: 30 capsule, Rfl: 0  •  trimethoprim-polymyxin b (Polytrim) 15359-9.1 UNIT/ML-% ophthalmic solution, Administer 1 drop to both eyes Every 4 (Four) Hours., Disp: 10 mL, Rfl: 0     Allergies   Allergen Reactions   • Amitiza [Lubiprostone] Other (See Comments)     Migraines   • Codeine Hives   • Pantoprazole Nausea And Vomiting        Social History     Socioeconomic History   • Marital status:    Tobacco Use   • Smoking status: Former Smoker     Packs/day: 1.00     Years: 20.00     Pack years: 20.00     Quit date:      Years since quittin.6   • Smokeless tobacco: Never Used   Vaping Use   • Vaping Use: Never used   Substance and Sexual Activity   • Alcohol use: Yes     Alcohol/week: 2.0 standard drinks     Types: 2 Glasses of wine per week     Comment: occ   • Drug use: No   • Sexual activity: Yes     Partners: Male     Birth control/protection: None        Family History   Problem Relation Age of Onset   • Heart disease Mother    • Inflammatory bowel disease Mother    • Colon cancer Mother    • Colon polyps Mother    • Liver cancer Mother    • Ulcerative colitis Mother    • Cerebral aneurysm Father    • Crohn's disease Brother    • Colon polyps Brother    • Crohn's disease Cousin    • Crohn's disease Cousin    • Heart attack Brother 48   • Heart attack Brother 40        Review of Systems   Eyes: Positive for visual  "disturbance.   Neurological: Positive for dizziness and headaches. Negative for seizures, speech difficulty, weakness, light-headedness and numbness.       Objective     Vitals:    08/10/22 1359   BP: 114/74   Pulse: 87   Temp: 97.3 °F (36.3 °C)   SpO2: 100%   Weight: 55.2 kg (121 lb 9.6 oz)   Height: 162.6 cm (64\")     Body mass index is 20.87 kg/m².      Physical Exam  Neurologic Exam  Awake, alert, oriented x3  Pupils equal round reactive to light  Extraocular muscles intact  Face symmetric  Speech is fluent and clear  No pronator drift  Motor exam  Bilateral deltoids 5/5, bilateral biceps 5/5, bilateral triceps 5/5, bilateral wrist extension 5/5 bilateral hand  5/5  Bilateral hip flexion 5/5, bilateral knee extension 5/5, bilateral DF/PF 5/5  No clonus  No Anushka's reflex  Steady normal gait  Able to detect  light touch in all 4 extremities        Assessment & Plan   Independent Review of Radiographic Studies:      I personally reviewed the images from the following studies.  MRA brain from 2022, CTA head 2021  The MRA images show no growth of the left ophthalmic artery aneurysm.    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  She has a positive family history of father who  around her age from a ruptured aneurysm.  She also has frequent headaches on the left side, which is the same side as the aneurysm.  She likely has a less than 1% chance of hemorrhage per year.  I have offered her endovascular embolization of the aneurysm which would likely carry a 2 to 3% risk complications.  Since there has been no growth on follow-up studies she would like to continue to monitor the aneurysm for now.  If there is any growth on a follow-up study we will proceed with embolization of the aneurysm.  We talked about the risks and benefits of " embolization using an intra saccular device versus flow diverting stents.  -I will plan to see her back in 6 months with a repeat CTA head.  I have asked her to call or come back sooner if she develops any changes in the frequency or severity of her headaches or new neurologic symptoms.    Diagnoses and all orders for this visit:    1. Cerebral aneurysm, nonruptured (Primary)  -     CT Angiogram Head With Contrast; Future      Return in about 6 months (around 2/10/2023).  I spent 35 minutes reviewing the medical record, reviewing the CTA images, reviewing the MRI images, discussing the risks and benefits of endovascular embolization of aneurysms, discussing the natural history of aneurysm

## 2022-08-10 ENCOUNTER — OFFICE VISIT (OUTPATIENT)
Dept: NEUROSURGERY | Facility: CLINIC | Age: 57
End: 2022-08-10

## 2022-08-10 VITALS
TEMPERATURE: 97.3 F | OXYGEN SATURATION: 100 % | WEIGHT: 121.6 LBS | HEART RATE: 87 BPM | SYSTOLIC BLOOD PRESSURE: 114 MMHG | DIASTOLIC BLOOD PRESSURE: 74 MMHG | BODY MASS INDEX: 20.76 KG/M2 | HEIGHT: 64 IN

## 2022-08-10 DIAGNOSIS — I67.1 CEREBRAL ANEURYSM, NONRUPTURED: Primary | ICD-10-CM

## 2022-08-10 PROCEDURE — 99214 OFFICE O/P EST MOD 30 MIN: CPT | Performed by: NEUROLOGICAL SURGERY

## 2022-08-16 ENCOUNTER — TELEPHONE (OUTPATIENT)
Dept: GASTROENTEROLOGY | Facility: CLINIC | Age: 57
End: 2022-08-16

## 2022-08-18 ENCOUNTER — TELEPHONE (OUTPATIENT)
Dept: NEUROSURGERY | Facility: CLINIC | Age: 57
End: 2022-08-18

## 2022-08-18 NOTE — TELEPHONE ENCOUNTER
Caller: Iliana Reyes    Relationship: Self    Best call back number: [phone]    What is the medical concern/diagnosis: HEADACHES    What specialty or service is being requested: UNKNOWN    What is the provider, practice or medical service name: [facility]    Any additional details: PATIENT STATES AT HER 8/10/22 APPT WITH DR WEINBERG, HE DISCUSSED REFERRING HER TO ANOTHER PROVIDER IN REGARDS TO HER HEADACHES. PLEASE CALL PATIENT TO CLARIFY HOW/TO WHOM HE MEANT TO REFER HER.

## 2022-08-22 NOTE — TELEPHONE ENCOUNTER
Dr. Nieto did not discuss a specific Neurologist with patient. She can contact her PCP to see if it is any specific Neurologist they would want her to see.   Abdomen soft, non-tender, no guarding.

## 2022-09-01 ENCOUNTER — OFFICE VISIT (OUTPATIENT)
Dept: INTERNAL MEDICINE | Facility: CLINIC | Age: 57
End: 2022-09-01

## 2022-09-01 VITALS
HEART RATE: 80 BPM | BODY MASS INDEX: 20.32 KG/M2 | TEMPERATURE: 97.7 F | HEIGHT: 64 IN | RESPIRATION RATE: 14 BRPM | SYSTOLIC BLOOD PRESSURE: 116 MMHG | WEIGHT: 119 LBS | DIASTOLIC BLOOD PRESSURE: 74 MMHG

## 2022-09-01 DIAGNOSIS — R51.9 NONINTRACTABLE HEADACHE, UNSPECIFIED CHRONICITY PATTERN, UNSPECIFIED HEADACHE TYPE: Primary | ICD-10-CM

## 2022-09-01 DIAGNOSIS — I67.1 CEREBRAL ANEURYSM WITHOUT RUPTURE: Chronic | ICD-10-CM

## 2022-09-01 PROCEDURE — 99214 OFFICE O/P EST MOD 30 MIN: CPT | Performed by: NURSE PRACTITIONER

## 2022-09-02 ENCOUNTER — TELEPHONE (OUTPATIENT)
Dept: INTERNAL MEDICINE | Facility: CLINIC | Age: 57
End: 2022-09-02

## 2022-09-02 RX ORDER — ERENUMAB-AOOE 140 MG/ML
140 INJECTION, SOLUTION SUBCUTANEOUS ONCE
Qty: 1 ML | Refills: 5 | Status: SHIPPED | OUTPATIENT
Start: 2022-09-02 | End: 2022-09-02

## 2022-09-02 NOTE — TELEPHONE ENCOUNTER
Pt informed and will be looking for PA since it will most likely require one.       ----- Message from OCTAVIO Cuellar sent at 9/2/2022 12:33 PM EDT -----  Please let her know I am sending in Aimovig 140 mg SC q month after discussing it with Dr. Nieto. She is to let him know though if HA become more frequent or change in intensity. Please let her know Aimovig can cause constipation, muscle aches and sometimes increase BP.

## 2022-09-12 DIAGNOSIS — R51.9 NONINTRACTABLE HEADACHE, UNSPECIFIED CHRONICITY PATTERN, UNSPECIFIED HEADACHE TYPE: Primary | ICD-10-CM

## 2022-09-12 RX ORDER — PROPRANOLOL HYDROCHLORIDE 40 MG/1
40 TABLET ORAL 2 TIMES DAILY
Qty: 180 TABLET | Refills: 0 | Status: SHIPPED | OUTPATIENT
Start: 2022-09-12 | End: 2022-10-27 | Stop reason: SDUPTHER

## 2022-10-07 DIAGNOSIS — Z00.00 ANNUAL PHYSICAL EXAM: ICD-10-CM

## 2022-10-12 NOTE — PROGRESS NOTES
Subjective   Chief Complaint   Patient presents with   • Annual Exam       History of Present Illness   56 y.o. female presents for annual physical. M-HA 50% improved since starting Propranolol. Frequency uncharged but duration and severity improved. Walking 3-6 miles daily.     P/P with Dr Carvajal. MMG overdue but she will schedule (order placed by GYN). CLS UTD with Dr. Cole with recall advised in 2024. Eye care and dental cleanings UTD.      Patient Active Problem List   Diagnosis   • ASCUS with positive high risk HPV cervical   • Headache   • Cerebral aneurysm without rupture       Allergies   Allergen Reactions   • Amitiza [Lubiprostone] Other (See Comments)     Migraines   • Codeine Hives   • Pantoprazole Nausea And Vomiting       Current Outpatient Medications on File Prior to Visit   Medication Sig Dispense Refill   • Calcium-Magnesium-Vitamin D (CALCIUM 500 PO) Take  by mouth 2 (Two) Times a Day.     • cholecalciferol (VITAMIN D3) 25 MCG (1000 UT) tablet Take 1,000 Units by mouth Daily.     • diphenhydrAMINE (BENADRYL) 25 mg Take 25 mg by mouth every 6 (six) hours as needed for itching.     • estradiol (VIVELLE-DOT) 0.025 MG/24HR patch PLACE 1 PATCH ON THE SKIN AS DIRECTED BY PROVIDER TWICE A WEEK 8 patch 11   • linaclotide (Linzess) 145 MCG capsule capsule Take 1 capsule by mouth Every Morning Before Breakfast. 90 capsule 3   • Omega-3 Fatty Acids (FISH OIL) 1000 MG capsule capsule Take  by mouth Daily With Breakfast.     • Restasis 0.05 % ophthalmic emulsion Administer 1 drop to both eyes 2 (Two) Times a Day.     • trimethoprim-polymyxin b (Polytrim) 22139-3.1 UNIT/ML-% ophthalmic solution Administer 1 drop to both eyes Every 4 (Four) Hours. 10 mL 0   • [DISCONTINUED] propranolol (INDERAL) 40 MG tablet Take 1 tablet by mouth 2 (Two) Times a Day. 180 tablet 0   • [DISCONTINUED] benzonatate (Tessalon Perles) 100 MG capsule Take 1 capsule by mouth 3 (Three) Times a Day As Needed for Cough. 30 capsule 0      No current facility-administered medications on file prior to visit.       Past Medical History:   Diagnosis Date   • Leiva esophagus    • Chest pain 2016   • Dyspepsia    • Gastric ulcer    • GERD (gastroesophageal reflux disease)    • Headache    • Irritable bowel syndrome    • Tubular adenoma of colon        Family History   Problem Relation Age of Onset   • Heart disease Mother    • Inflammatory bowel disease Mother    • Colon cancer Mother    • Colon polyps Mother    • Liver cancer Mother    • Ulcerative colitis Mother    • Cerebral aneurysm Father    • Crohn's disease Brother    • Colon polyps Brother    • Crohn's disease Cousin    • Crohn's disease Cousin    • Heart attack Brother 48   • Heart attack Brother 40       Social History     Socioeconomic History   • Marital status:    Tobacco Use   • Smoking status: Former     Packs/day: 1.00     Years: 20.00     Pack years: 20.00     Types: Cigarettes     Quit date:      Years since quittin.8   • Smokeless tobacco: Never   Vaping Use   • Vaping Use: Never used   Substance and Sexual Activity   • Alcohol use: Yes     Alcohol/week: 2.0 standard drinks     Types: 2 Glasses of wine per week     Comment: occ   • Drug use: No   • Sexual activity: Yes     Partners: Male     Birth control/protection: None       Past Surgical History:   Procedure Laterality Date   • CHOLECYSTECTOMY      Dr. EDER Alba   • COLONOSCOPY  2016    polyp, tics, tubular adenoma   • COLONOSCOPY  2019    normal ileum, diverticulosis, NBIH, TAx1   • ENDOSCOPY  2016    gastric ulcer w/clean base, acute gastritis. Leiva's esophagus   • ENDOSCOPY  2019    normal esophagus/duodenum, acute gastritis   • HYSTERECTOMY     • OOPHORECTOMY     • SHOULDER ARTHROSCOPY      Dr. Plunkett   • UPPER GASTROINTESTINAL ENDOSCOPY  2016       The following portions of the patient's history were reviewed and updated as appropriate: problem list, allergies, current  "medications, past medical history, past family history, past social history and past surgical history.    Review of Systems   Constitutional: Negative for fatigue.   HENT: Negative for congestion.    Eyes: Negative for visual disturbance.   Respiratory: Negative for shortness of breath.    Cardiovascular: Negative for chest pain.   Gastrointestinal: Negative for blood in stool.   Endocrine: Negative.    Genitourinary: Negative.    Musculoskeletal: Negative for arthralgias.   Skin: Negative for rash.   Allergic/Immunologic: Negative for environmental allergies.   Neurological: Positive for headache.   Hematological: Does not bruise/bleed easily.   Psychiatric/Behavioral: The patient is not nervous/anxious.        Immunization History   Administered Date(s) Administered   • COVID-19 (PFIZER) PURPLE CAP 03/27/2021, 04/19/2021, 12/07/2021   • Flu Vaccine Quad PF >36MO 10/14/2020   • FluLaval/Fluzone >6mos 10/14/2020, 10/14/2021   • Flublok 18+yrs 04/21/2022   • Hepatitis A 05/05/2018   • Influenza Quad Vaccine (Inpatient) 09/28/2019   • Pneumococcal Polysaccharide (PPSV23) 02/21/2022   • Tdap 01/23/2020       Objective   Vitals:    10/27/22 0851   BP: 102/60   Pulse: 68   Resp: 12   Temp: 97.7 °F (36.5 °C)   Weight: 55.3 kg (122 lb)   Height: 162.6 cm (64\")     Body mass index is 20.94 kg/m².  Physical Exam  Vitals reviewed.   Constitutional:       Appearance: Normal appearance. She is well-developed and normal weight.   HENT:      Head: Normocephalic and atraumatic.      Right Ear: Tympanic membrane, ear canal and external ear normal.      Left Ear: Tympanic membrane, ear canal and external ear normal.      Nose: Nose normal.      Mouth/Throat:      Mouth: Mucous membranes are moist.      Pharynx: Oropharynx is clear. No oropharyngeal exudate or posterior oropharyngeal erythema.   Eyes:      General: No scleral icterus.     Extraocular Movements: Extraocular movements intact.      Conjunctiva/sclera: Conjunctivae " normal.      Pupils: Pupils are equal, round, and reactive to light.   Neck:      Thyroid: No thyromegaly.      Vascular: No carotid bruit.   Cardiovascular:      Rate and Rhythm: Normal rate and regular rhythm.      Heart sounds: Normal heart sounds. No murmur heard.  Pulmonary:      Effort: Pulmonary effort is normal.      Breath sounds: Normal breath sounds.   Abdominal:      General: Bowel sounds are normal. There is no distension.      Palpations: Abdomen is soft.      Tenderness: There is no abdominal tenderness.   Genitourinary:     Comments: P/P and CBE with GYN.   Musculoskeletal:      Cervical back: Neck supple.      Comments: Ambulates without assistance; no clubbing, cyanosis or edema.    Lymphadenopathy:      Cervical: No cervical adenopathy.   Skin:     General: Skin is warm and dry.   Neurological:      Mental Status: She is alert.   Psychiatric:         Mood and Affect: Mood normal.         Behavior: Behavior normal.         Procedures    Assessment & Plan   Diagnoses and all orders for this visit:    1. Annual physical exam (Primary)  Comments:  150 minutes weekly aerobic exercise advised. SG advised. Flu shot today.   Orders:  -     Comprehensive Metabolic Panel; Future  -     Lipid Panel With / Chol / HDL Ratio; Future    2. Nonintractable headache, unspecified chronicity pattern, unspecified headache type  Comments:  Frequency unchanged but severity and duration 50% better. Wishes to continue Propranolol 40 mg bid and see how she does. Discussed Aimovig.   Orders:  -     propranolol (INDERAL) 40 MG tablet; Take 1 tablet by mouth 2 (Two) Times a Day.  Dispense: 180 tablet; Refill: 1    3. Screening for lung cancer  Comments:  Quit smoking in 2014 after smoking 1 ppd x 20 yrs.   Orders:  -      CT Chest Low Dose Cancer Screening WO; Future    Records reviewed include previous OV with myself as well as labs.     Return in about 1 year (around 10/27/2023) for Lab B4 FUP.

## 2022-10-13 LAB
ALBUMIN SERPL-MCNC: 4.3 G/DL (ref 3.5–5.2)
ALBUMIN/GLOB SERPL: 2.3 G/DL
ALP SERPL-CCNC: 67 U/L (ref 39–117)
ALT SERPL-CCNC: 8 U/L (ref 1–33)
AST SERPL-CCNC: 16 U/L (ref 1–32)
BILIRUB SERPL-MCNC: 0.7 MG/DL (ref 0–1.2)
BUN SERPL-MCNC: 7 MG/DL (ref 6–20)
BUN/CREAT SERPL: 12.7 (ref 7–25)
CALCIUM SERPL-MCNC: 9.3 MG/DL (ref 8.6–10.5)
CHLORIDE SERPL-SCNC: 102 MMOL/L (ref 98–107)
CHOLEST SERPL-MCNC: 155 MG/DL (ref 0–200)
CHOLEST/HDLC SERPL: 2.35 {RATIO}
CO2 SERPL-SCNC: 29.7 MMOL/L (ref 22–29)
CREAT SERPL-MCNC: 0.55 MG/DL (ref 0.57–1)
EGFRCR SERPLBLD CKD-EPI 2021: 108.4 ML/MIN/1.73
GLOBULIN SER CALC-MCNC: 1.9 GM/DL
GLUCOSE SERPL-MCNC: 84 MG/DL (ref 65–99)
HDLC SERPL-MCNC: 66 MG/DL (ref 40–60)
LDLC SERPL CALC-MCNC: 78 MG/DL (ref 0–100)
POTASSIUM SERPL-SCNC: 4.6 MMOL/L (ref 3.5–5.2)
PROT SERPL-MCNC: 6.2 G/DL (ref 6–8.5)
SODIUM SERPL-SCNC: 141 MMOL/L (ref 136–145)
TRIGL SERPL-MCNC: 52 MG/DL (ref 0–150)
VLDLC SERPL CALC-MCNC: 11 MG/DL (ref 5–40)

## 2022-10-26 NOTE — PATIENT INSTRUCTIONS
You are advised to get Shingrix. It is a series of 2 shots given 2-6 months apart. Get this at you local pharmacy.      An order was placed for your screening mammogram. Please be sure to have this scheduled.

## 2022-10-27 ENCOUNTER — OFFICE VISIT (OUTPATIENT)
Dept: INTERNAL MEDICINE | Facility: CLINIC | Age: 57
End: 2022-10-27

## 2022-10-27 VITALS
DIASTOLIC BLOOD PRESSURE: 60 MMHG | TEMPERATURE: 97.7 F | SYSTOLIC BLOOD PRESSURE: 102 MMHG | HEART RATE: 68 BPM | RESPIRATION RATE: 12 BRPM | BODY MASS INDEX: 20.83 KG/M2 | WEIGHT: 122 LBS | HEIGHT: 64 IN

## 2022-10-27 DIAGNOSIS — Z00.00 ANNUAL PHYSICAL EXAM: Primary | ICD-10-CM

## 2022-10-27 DIAGNOSIS — Z23 NEED FOR INFLUENZA VACCINATION: ICD-10-CM

## 2022-10-27 DIAGNOSIS — R51.9 NONINTRACTABLE HEADACHE, UNSPECIFIED CHRONICITY PATTERN, UNSPECIFIED HEADACHE TYPE: Chronic | ICD-10-CM

## 2022-10-27 DIAGNOSIS — Z12.2 SCREENING FOR LUNG CANCER: ICD-10-CM

## 2022-10-27 PROCEDURE — 90471 IMMUNIZATION ADMIN: CPT | Performed by: NURSE PRACTITIONER

## 2022-10-27 PROCEDURE — 99396 PREV VISIT EST AGE 40-64: CPT | Performed by: NURSE PRACTITIONER

## 2022-10-27 PROCEDURE — 90686 IIV4 VACC NO PRSV 0.5 ML IM: CPT | Performed by: NURSE PRACTITIONER

## 2022-10-27 RX ORDER — PROPRANOLOL HYDROCHLORIDE 40 MG/1
40 TABLET ORAL 2 TIMES DAILY
Qty: 180 TABLET | Refills: 1 | Status: SHIPPED | OUTPATIENT
Start: 2022-10-27 | End: 2022-11-02 | Stop reason: HOSPADM

## 2022-10-31 ENCOUNTER — APPOINTMENT (OUTPATIENT)
Dept: CT IMAGING | Facility: HOSPITAL | Age: 57
End: 2022-10-31

## 2022-10-31 ENCOUNTER — TELEPHONE (OUTPATIENT)
Dept: INTERNAL MEDICINE | Facility: CLINIC | Age: 57
End: 2022-10-31

## 2022-10-31 ENCOUNTER — HOSPITAL ENCOUNTER (OUTPATIENT)
Facility: HOSPITAL | Age: 57
Setting detail: OBSERVATION
Discharge: HOME | End: 2022-11-02
Attending: EMERGENCY MEDICINE | Admitting: INTERNAL MEDICINE

## 2022-10-31 DIAGNOSIS — D72.829 LEUKOCYTOSIS, UNSPECIFIED TYPE: Primary | ICD-10-CM

## 2022-10-31 DIAGNOSIS — N12 PYELONEPHRITIS: ICD-10-CM

## 2022-10-31 DIAGNOSIS — R11.2 NAUSEA AND VOMITING, UNSPECIFIED VOMITING TYPE: ICD-10-CM

## 2022-10-31 DIAGNOSIS — R93.429 ABNORMAL CT SCAN, KIDNEY: ICD-10-CM

## 2022-10-31 DIAGNOSIS — I95.9 HYPOTENSION, UNSPECIFIED HYPOTENSION TYPE: ICD-10-CM

## 2022-10-31 DIAGNOSIS — R10.9 FLANK PAIN: ICD-10-CM

## 2022-10-31 LAB
ALBUMIN SERPL-MCNC: 4.5 G/DL (ref 3.5–5.2)
ALBUMIN/GLOB SERPL: 1.9 G/DL
ALP SERPL-CCNC: 74 U/L (ref 39–117)
ALT SERPL W P-5'-P-CCNC: 7 U/L (ref 1–33)
ANION GAP SERPL CALCULATED.3IONS-SCNC: 11 MMOL/L (ref 5–15)
AST SERPL-CCNC: 18 U/L (ref 1–32)
BASOPHILS # BLD AUTO: 0.03 10*3/MM3 (ref 0–0.2)
BASOPHILS NFR BLD AUTO: 0.2 % (ref 0–1.5)
BILIRUB SERPL-MCNC: 1.1 MG/DL (ref 0–1.2)
BILIRUB UR QL STRIP: NEGATIVE
BUN SERPL-MCNC: 6 MG/DL (ref 6–20)
BUN/CREAT SERPL: 11.8 (ref 7–25)
CALCIUM SPEC-SCNC: 9.3 MG/DL (ref 8.6–10.5)
CHLORIDE SERPL-SCNC: 98 MMOL/L (ref 98–107)
CLARITY UR: CLEAR
CO2 SERPL-SCNC: 29 MMOL/L (ref 22–29)
COLOR UR: YELLOW
CREAT SERPL-MCNC: 0.51 MG/DL (ref 0.57–1)
D-LACTATE SERPL-SCNC: 1.9 MMOL/L (ref 0.5–2)
DEPRECATED RDW RBC AUTO: 39.8 FL (ref 37–54)
EGFRCR SERPLBLD CKD-EPI 2021: 110.4 ML/MIN/1.73
EOSINOPHIL # BLD AUTO: 0 10*3/MM3 (ref 0–0.4)
EOSINOPHIL NFR BLD AUTO: 0 % (ref 0.3–6.2)
ERYTHROCYTE [DISTWIDTH] IN BLOOD BY AUTOMATED COUNT: 11.7 % (ref 12.3–15.4)
GLOBULIN UR ELPH-MCNC: 2.4 GM/DL
GLUCOSE SERPL-MCNC: 126 MG/DL (ref 65–99)
GLUCOSE UR STRIP-MCNC: NEGATIVE MG/DL
HCT VFR BLD AUTO: 41.8 % (ref 34–46.6)
HGB BLD-MCNC: 14 G/DL (ref 12–15.9)
HGB UR QL STRIP.AUTO: NEGATIVE
IMM GRANULOCYTES # BLD AUTO: 0.12 10*3/MM3 (ref 0–0.05)
IMM GRANULOCYTES NFR BLD AUTO: 0.6 % (ref 0–0.5)
KETONES UR QL STRIP: NEGATIVE
LEUKOCYTE ESTERASE UR QL STRIP.AUTO: NEGATIVE
LIPASE SERPL-CCNC: 20 U/L (ref 13–60)
LYMPHOCYTES # BLD AUTO: 1.22 10*3/MM3 (ref 0.7–3.1)
LYMPHOCYTES NFR BLD AUTO: 6.3 % (ref 19.6–45.3)
MAGNESIUM SERPL-MCNC: 1.6 MG/DL (ref 1.6–2.6)
MCH RBC QN AUTO: 31 PG (ref 26.6–33)
MCHC RBC AUTO-ENTMCNC: 33.5 G/DL (ref 31.5–35.7)
MCV RBC AUTO: 92.7 FL (ref 79–97)
MONOCYTES # BLD AUTO: 1.13 10*3/MM3 (ref 0.1–0.9)
MONOCYTES NFR BLD AUTO: 5.9 % (ref 5–12)
NEUTROPHILS NFR BLD AUTO: 16.76 10*3/MM3 (ref 1.7–7)
NEUTROPHILS NFR BLD AUTO: 87 % (ref 42.7–76)
NITRITE UR QL STRIP: NEGATIVE
NRBC BLD AUTO-RTO: 0 /100 WBC (ref 0–0.2)
PH UR STRIP.AUTO: 8 [PH] (ref 5–8)
PLATELET # BLD AUTO: 236 10*3/MM3 (ref 140–450)
PMV BLD AUTO: 12.6 FL (ref 6–12)
POTASSIUM SERPL-SCNC: 3.7 MMOL/L (ref 3.5–5.2)
PROT SERPL-MCNC: 6.9 G/DL (ref 6–8.5)
PROT UR QL STRIP: NEGATIVE
RBC # BLD AUTO: 4.51 10*6/MM3 (ref 3.77–5.28)
SODIUM SERPL-SCNC: 138 MMOL/L (ref 136–145)
SP GR UR STRIP: 1.01 (ref 1–1.03)
UROBILINOGEN UR QL STRIP: NORMAL
WBC NRBC COR # BLD: 19.26 10*3/MM3 (ref 3.4–10.8)

## 2022-10-31 PROCEDURE — 87040 BLOOD CULTURE FOR BACTERIA: CPT | Performed by: EMERGENCY MEDICINE

## 2022-10-31 PROCEDURE — G0378 HOSPITAL OBSERVATION PER HR: HCPCS

## 2022-10-31 PROCEDURE — 96361 HYDRATE IV INFUSION ADD-ON: CPT

## 2022-10-31 PROCEDURE — 25010000002 MORPHINE PER 10 MG: Performed by: EMERGENCY MEDICINE

## 2022-10-31 PROCEDURE — 36415 COLL VENOUS BLD VENIPUNCTURE: CPT

## 2022-10-31 PROCEDURE — 25010000002 ONDANSETRON PER 1 MG: Performed by: EMERGENCY MEDICINE

## 2022-10-31 PROCEDURE — 83605 ASSAY OF LACTIC ACID: CPT | Performed by: EMERGENCY MEDICINE

## 2022-10-31 PROCEDURE — 83690 ASSAY OF LIPASE: CPT | Performed by: EMERGENCY MEDICINE

## 2022-10-31 PROCEDURE — 25010000002 ONDANSETRON PER 1 MG: Performed by: INTERNAL MEDICINE

## 2022-10-31 PROCEDURE — 25010000002 IOPAMIDOL 61 % SOLUTION: Performed by: EMERGENCY MEDICINE

## 2022-10-31 PROCEDURE — 99285 EMERGENCY DEPT VISIT HI MDM: CPT

## 2022-10-31 PROCEDURE — 80053 COMPREHEN METABOLIC PANEL: CPT | Performed by: EMERGENCY MEDICINE

## 2022-10-31 PROCEDURE — 81003 URINALYSIS AUTO W/O SCOPE: CPT | Performed by: EMERGENCY MEDICINE

## 2022-10-31 PROCEDURE — 87086 URINE CULTURE/COLONY COUNT: CPT | Performed by: EMERGENCY MEDICINE

## 2022-10-31 PROCEDURE — 96375 TX/PRO/DX INJ NEW DRUG ADDON: CPT

## 2022-10-31 PROCEDURE — 83735 ASSAY OF MAGNESIUM: CPT | Performed by: EMERGENCY MEDICINE

## 2022-10-31 PROCEDURE — 85025 COMPLETE CBC W/AUTO DIFF WBC: CPT | Performed by: EMERGENCY MEDICINE

## 2022-10-31 PROCEDURE — 74177 CT ABD & PELVIS W/CONTRAST: CPT

## 2022-10-31 PROCEDURE — 25010000002 HYDROMORPHONE 1 MG/ML SOLUTION: Performed by: EMERGENCY MEDICINE

## 2022-10-31 PROCEDURE — 96376 TX/PRO/DX INJ SAME DRUG ADON: CPT

## 2022-10-31 PROCEDURE — 96365 THER/PROPH/DIAG IV INF INIT: CPT

## 2022-10-31 PROCEDURE — 25010000002 CEFTRIAXONE PER 250 MG: Performed by: EMERGENCY MEDICINE

## 2022-10-31 RX ORDER — POLYMYXIN B SULFATE AND TRIMETHOPRIM 1; 10000 MG/ML; [USP'U]/ML
1 SOLUTION OPHTHALMIC EVERY 4 HOURS
Status: DISCONTINUED | OUTPATIENT
Start: 2022-10-31 | End: 2022-11-01

## 2022-10-31 RX ORDER — ALUMINA, MAGNESIA, AND SIMETHICONE 2400; 2400; 240 MG/30ML; MG/30ML; MG/30ML
15 SUSPENSION ORAL ONCE
Status: COMPLETED | OUTPATIENT
Start: 2022-10-31 | End: 2022-10-31

## 2022-10-31 RX ORDER — ESTRADIOL 0.03 MG/D
1 FILM, EXTENDED RELEASE TRANSDERMAL 2 TIMES WEEKLY
Status: DISCONTINUED | OUTPATIENT
Start: 2022-11-01 | End: 2022-11-02 | Stop reason: HOSPADM

## 2022-10-31 RX ORDER — MELATONIN
1000 DAILY
Status: DISCONTINUED | OUTPATIENT
Start: 2022-10-31 | End: 2022-11-02 | Stop reason: HOSPADM

## 2022-10-31 RX ORDER — ONDANSETRON 2 MG/ML
4 INJECTION INTRAMUSCULAR; INTRAVENOUS ONCE
Status: COMPLETED | OUTPATIENT
Start: 2022-10-31 | End: 2022-10-31

## 2022-10-31 RX ORDER — ONDANSETRON 2 MG/ML
4 INJECTION INTRAMUSCULAR; INTRAVENOUS EVERY 6 HOURS PRN
Status: DISCONTINUED | OUTPATIENT
Start: 2022-10-31 | End: 2022-11-01

## 2022-10-31 RX ORDER — ACETAMINOPHEN 160 MG/5ML
650 SOLUTION ORAL EVERY 4 HOURS PRN
Status: DISCONTINUED | OUTPATIENT
Start: 2022-10-31 | End: 2022-11-02 | Stop reason: HOSPADM

## 2022-10-31 RX ORDER — ACETAMINOPHEN 325 MG/1
650 TABLET ORAL EVERY 4 HOURS PRN
Status: DISCONTINUED | OUTPATIENT
Start: 2022-10-31 | End: 2022-11-02 | Stop reason: HOSPADM

## 2022-10-31 RX ORDER — ACETAMINOPHEN 650 MG/1
650 SUPPOSITORY RECTAL EVERY 4 HOURS PRN
Status: DISCONTINUED | OUTPATIENT
Start: 2022-10-31 | End: 2022-11-02 | Stop reason: HOSPADM

## 2022-10-31 RX ORDER — SODIUM CHLORIDE 9 MG/ML
75 INJECTION, SOLUTION INTRAVENOUS CONTINUOUS
Status: DISCONTINUED | OUTPATIENT
Start: 2022-10-31 | End: 2022-11-02

## 2022-10-31 RX ORDER — SODIUM CHLORIDE 0.9 % (FLUSH) 0.9 %
10 SYRINGE (ML) INJECTION EVERY 12 HOURS SCHEDULED
Status: DISCONTINUED | OUTPATIENT
Start: 2022-10-31 | End: 2022-11-02 | Stop reason: HOSPADM

## 2022-10-31 RX ORDER — SODIUM CHLORIDE 9 MG/ML
100 INJECTION, SOLUTION INTRAVENOUS CONTINUOUS
Status: DISCONTINUED | OUTPATIENT
Start: 2022-10-31 | End: 2022-10-31

## 2022-10-31 RX ORDER — NITROGLYCERIN 0.4 MG/1
0.4 TABLET SUBLINGUAL
Status: DISCONTINUED | OUTPATIENT
Start: 2022-10-31 | End: 2022-11-02 | Stop reason: HOSPADM

## 2022-10-31 RX ORDER — SODIUM CHLORIDE 0.9 % (FLUSH) 0.9 %
10 SYRINGE (ML) INJECTION AS NEEDED
Status: DISCONTINUED | OUTPATIENT
Start: 2022-10-31 | End: 2022-11-02 | Stop reason: HOSPADM

## 2022-10-31 RX ORDER — MORPHINE SULFATE 2 MG/ML
4 INJECTION, SOLUTION INTRAMUSCULAR; INTRAVENOUS EVERY 4 HOURS PRN
Status: DISCONTINUED | OUTPATIENT
Start: 2022-10-31 | End: 2022-11-02 | Stop reason: HOSPADM

## 2022-10-31 RX ORDER — CYCLOSPORINE 0.5 MG/ML
1 EMULSION OPHTHALMIC 2 TIMES DAILY
Status: DISCONTINUED | OUTPATIENT
Start: 2022-10-31 | End: 2022-11-02 | Stop reason: HOSPADM

## 2022-10-31 RX ORDER — MORPHINE SULFATE 2 MG/ML
4 INJECTION, SOLUTION INTRAMUSCULAR; INTRAVENOUS ONCE
Status: COMPLETED | OUTPATIENT
Start: 2022-10-31 | End: 2022-10-31

## 2022-10-31 RX ORDER — LIDOCAINE HYDROCHLORIDE 20 MG/ML
15 SOLUTION OROPHARYNGEAL ONCE
Status: COMPLETED | OUTPATIENT
Start: 2022-10-31 | End: 2022-10-31

## 2022-10-31 RX ADMIN — SODIUM CHLORIDE 1000 ML: 9 INJECTION, SOLUTION INTRAVENOUS at 11:29

## 2022-10-31 RX ADMIN — LIDOCAINE HYDROCHLORIDE 15 ML: 20 SOLUTION ORAL; TOPICAL at 11:29

## 2022-10-31 RX ADMIN — ONDANSETRON 4 MG: 2 INJECTION INTRAMUSCULAR; INTRAVENOUS at 20:17

## 2022-10-31 RX ADMIN — ALUMINUM HYDROXIDE, MAGNESIUM HYDROXIDE, AND DIMETHICONE 15 ML: 400; 400; 40 SUSPENSION ORAL at 11:30

## 2022-10-31 RX ADMIN — SODIUM CHLORIDE 150 ML/HR: 9 INJECTION, SOLUTION INTRAVENOUS at 19:58

## 2022-10-31 RX ADMIN — IOPAMIDOL 85 ML: 612 INJECTION, SOLUTION INTRAVENOUS at 12:54

## 2022-10-31 RX ADMIN — IOPAMIDOL 85 ML: 612 INJECTION, SOLUTION INTRAVENOUS at 12:53

## 2022-10-31 RX ADMIN — HYDROMORPHONE HYDROCHLORIDE 1 MG: 1 INJECTION, SOLUTION INTRAMUSCULAR; INTRAVENOUS; SUBCUTANEOUS at 14:04

## 2022-10-31 RX ADMIN — MORPHINE SULFATE 4 MG: 2 INJECTION, SOLUTION INTRAMUSCULAR; INTRAVENOUS at 11:34

## 2022-10-31 RX ADMIN — CEFTRIAXONE SODIUM 1 G: 1 INJECTION, POWDER, FOR SOLUTION INTRAMUSCULAR; INTRAVENOUS at 14:17

## 2022-10-31 RX ADMIN — ONDANSETRON 4 MG: 2 INJECTION INTRAMUSCULAR; INTRAVENOUS at 11:33

## 2022-10-31 RX ADMIN — Medication 10 ML: at 22:55

## 2022-10-31 RX ADMIN — SODIUM CHLORIDE 100 ML/HR: 9 INJECTION, SOLUTION INTRAVENOUS at 15:34

## 2022-10-31 RX ADMIN — SODIUM CHLORIDE, POTASSIUM CHLORIDE, SODIUM LACTATE AND CALCIUM CHLORIDE 1000 ML: 600; 310; 30; 20 INJECTION, SOLUTION INTRAVENOUS at 14:15

## 2022-10-31 RX ADMIN — CYCLOSPORINE 1 DROP: 0.5 EMULSION OPHTHALMIC at 22:55

## 2022-10-31 NOTE — TELEPHONE ENCOUNTER
Caller: Iliana Reyes    Relationship to patient: Self    Best call back number: [phone]    Chief complaint: CONSTANT VOMITING, FEVER, EXTREME PAIN ON RIGHT SIDE    Patient directed to call 911 or go to their nearest emergency room.     Patient verbalized understanding: [x] Yes  [] No  If no, why?    Additional notes: PATIENT STATES SHE THOUGHT IT WAS POSSIBLE HER APPENDIX WAS ABOUT TO RUPTURE.

## 2022-11-01 PROBLEM — H04.123 DRY EYES: Status: ACTIVE | Noted: 2022-11-01

## 2022-11-01 PROBLEM — K58.1 IRRITABLE BOWEL SYNDROME WITH CONSTIPATION: Status: ACTIVE | Noted: 2022-11-01

## 2022-11-01 PROBLEM — Z86.69 HISTORY OF MIGRAINE: Status: ACTIVE | Noted: 2022-11-01

## 2022-11-01 LAB
ALBUMIN SERPL-MCNC: 3.4 G/DL (ref 3.5–5.2)
ALBUMIN/GLOB SERPL: 1.6 G/DL
ALP SERPL-CCNC: 76 U/L (ref 39–117)
ALT SERPL W P-5'-P-CCNC: 12 U/L (ref 1–33)
ANION GAP SERPL CALCULATED.3IONS-SCNC: 11 MMOL/L (ref 5–15)
AST SERPL-CCNC: 19 U/L (ref 1–32)
BACTERIA SPEC AEROBE CULT: NO GROWTH
BASOPHILS # BLD AUTO: 0.02 10*3/MM3 (ref 0–0.2)
BASOPHILS NFR BLD AUTO: 0.1 % (ref 0–1.5)
BILIRUB SERPL-MCNC: 0.8 MG/DL (ref 0–1.2)
BUN SERPL-MCNC: 5 MG/DL (ref 6–20)
BUN/CREAT SERPL: 13.9 (ref 7–25)
CALCIUM SPEC-SCNC: 8.5 MG/DL (ref 8.6–10.5)
CHLORIDE SERPL-SCNC: 106 MMOL/L (ref 98–107)
CO2 SERPL-SCNC: 23 MMOL/L (ref 22–29)
CREAT SERPL-MCNC: 0.36 MG/DL (ref 0.57–1)
D-LACTATE SERPL-SCNC: 1.3 MMOL/L (ref 0.5–2)
DEPRECATED RDW RBC AUTO: 37.5 FL (ref 37–54)
EGFRCR SERPLBLD CKD-EPI 2021: 120.1 ML/MIN/1.73
EOSINOPHIL # BLD AUTO: 0.02 10*3/MM3 (ref 0–0.4)
EOSINOPHIL NFR BLD AUTO: 0.1 % (ref 0.3–6.2)
ERYTHROCYTE [DISTWIDTH] IN BLOOD BY AUTOMATED COUNT: 11.5 % (ref 12.3–15.4)
GLOBULIN UR ELPH-MCNC: 2.1 GM/DL
GLUCOSE SERPL-MCNC: 71 MG/DL (ref 65–99)
HCT VFR BLD AUTO: 33.5 % (ref 34–46.6)
HGB BLD-MCNC: 11.4 G/DL (ref 12–15.9)
IMM GRANULOCYTES # BLD AUTO: 0.08 10*3/MM3 (ref 0–0.05)
IMM GRANULOCYTES NFR BLD AUTO: 0.6 % (ref 0–0.5)
LYMPHOCYTES # BLD AUTO: 1.56 10*3/MM3 (ref 0.7–3.1)
LYMPHOCYTES NFR BLD AUTO: 11.2 % (ref 19.6–45.3)
MCH RBC QN AUTO: 30.4 PG (ref 26.6–33)
MCHC RBC AUTO-ENTMCNC: 34 G/DL (ref 31.5–35.7)
MCV RBC AUTO: 89.3 FL (ref 79–97)
MONOCYTES # BLD AUTO: 0.97 10*3/MM3 (ref 0.1–0.9)
MONOCYTES NFR BLD AUTO: 7 % (ref 5–12)
NEUTROPHILS NFR BLD AUTO: 11.23 10*3/MM3 (ref 1.7–7)
NEUTROPHILS NFR BLD AUTO: 81 % (ref 42.7–76)
NRBC BLD AUTO-RTO: 0 /100 WBC (ref 0–0.2)
PLATELET # BLD AUTO: 182 10*3/MM3 (ref 140–450)
PMV BLD AUTO: 12.2 FL (ref 6–12)
POTASSIUM SERPL-SCNC: 3.2 MMOL/L (ref 3.5–5.2)
PROT SERPL-MCNC: 5.5 G/DL (ref 6–8.5)
RBC # BLD AUTO: 3.75 10*6/MM3 (ref 3.77–5.28)
SODIUM SERPL-SCNC: 140 MMOL/L (ref 136–145)
WBC NRBC COR # BLD: 13.88 10*3/MM3 (ref 3.4–10.8)

## 2022-11-01 PROCEDURE — 25010000002 ONDANSETRON PER 1 MG: Performed by: NURSE PRACTITIONER

## 2022-11-01 PROCEDURE — 85025 COMPLETE CBC W/AUTO DIFF WBC: CPT | Performed by: INTERNAL MEDICINE

## 2022-11-01 PROCEDURE — 96376 TX/PRO/DX INJ SAME DRUG ADON: CPT

## 2022-11-01 PROCEDURE — 96361 HYDRATE IV INFUSION ADD-ON: CPT

## 2022-11-01 PROCEDURE — 25010000002 CEFTRIAXONE PER 250 MG: Performed by: INTERNAL MEDICINE

## 2022-11-01 PROCEDURE — 80053 COMPREHEN METABOLIC PANEL: CPT | Performed by: INTERNAL MEDICINE

## 2022-11-01 PROCEDURE — 25010000002 MORPHINE PER 10 MG: Performed by: INTERNAL MEDICINE

## 2022-11-01 PROCEDURE — 83605 ASSAY OF LACTIC ACID: CPT | Performed by: INTERNAL MEDICINE

## 2022-11-01 PROCEDURE — G0378 HOSPITAL OBSERVATION PER HR: HCPCS

## 2022-11-01 RX ORDER — POTASSIUM CHLORIDE 1.5 G/1.77G
40 POWDER, FOR SOLUTION ORAL AS NEEDED
Status: DISCONTINUED | OUTPATIENT
Start: 2022-11-01 | End: 2022-11-02 | Stop reason: HOSPADM

## 2022-11-01 RX ORDER — POTASSIUM CHLORIDE 7.45 MG/ML
10 INJECTION INTRAVENOUS
Status: DISCONTINUED | OUTPATIENT
Start: 2022-11-01 | End: 2022-11-02 | Stop reason: HOSPADM

## 2022-11-01 RX ORDER — POLYETHYLENE GLYCOL 3350 17 G/17G
17 POWDER, FOR SOLUTION ORAL DAILY
Status: DISCONTINUED | OUTPATIENT
Start: 2022-11-01 | End: 2022-11-02 | Stop reason: HOSPADM

## 2022-11-01 RX ORDER — POTASSIUM CHLORIDE 750 MG/1
40 TABLET, FILM COATED, EXTENDED RELEASE ORAL AS NEEDED
Status: DISCONTINUED | OUTPATIENT
Start: 2022-11-01 | End: 2022-11-02 | Stop reason: HOSPADM

## 2022-11-01 RX ORDER — ONDANSETRON 2 MG/ML
4 INJECTION INTRAMUSCULAR; INTRAVENOUS EVERY 4 HOURS PRN
Status: DISCONTINUED | OUTPATIENT
Start: 2022-11-01 | End: 2022-11-02 | Stop reason: HOSPADM

## 2022-11-01 RX ADMIN — MORPHINE SULFATE 4 MG: 2 INJECTION, SOLUTION INTRAMUSCULAR; INTRAVENOUS at 19:58

## 2022-11-01 RX ADMIN — CYCLOSPORINE 1 DROP: 0.5 EMULSION OPHTHALMIC at 10:16

## 2022-11-01 RX ADMIN — Medication 10 ML: at 22:53

## 2022-11-01 RX ADMIN — CALCIUM CARBONATE-VITAMIN D TAB 500 MG-200 UNIT 1 TABLET: 500-200 TAB at 22:52

## 2022-11-01 RX ADMIN — POTASSIUM CHLORIDE 40 MEQ: 750 TABLET, EXTENDED RELEASE ORAL at 19:58

## 2022-11-01 RX ADMIN — SODIUM CHLORIDE 150 ML/HR: 9 INJECTION, SOLUTION INTRAVENOUS at 15:07

## 2022-11-01 RX ADMIN — CEFTRIAXONE SODIUM 1 G: 1 INJECTION, POWDER, FOR SOLUTION INTRAMUSCULAR; INTRAVENOUS at 15:07

## 2022-11-01 RX ADMIN — MORPHINE SULFATE 4 MG: 2 INJECTION, SOLUTION INTRAMUSCULAR; INTRAVENOUS at 09:00

## 2022-11-01 RX ADMIN — Medication 1000 UNITS: at 10:16

## 2022-11-01 RX ADMIN — ONDANSETRON 4 MG: 2 INJECTION INTRAMUSCULAR; INTRAVENOUS at 15:06

## 2022-11-01 RX ADMIN — ACETAMINOPHEN 650 MG: 325 TABLET, FILM COATED ORAL at 04:19

## 2022-11-01 RX ADMIN — CYCLOSPORINE 1 DROP: 0.5 EMULSION OPHTHALMIC at 22:53

## 2022-11-01 RX ADMIN — SODIUM CHLORIDE 150 ML/HR: 9 INJECTION, SOLUTION INTRAVENOUS at 00:26

## 2022-11-01 RX ADMIN — POLYETHYLENE GLYCOL 3350 17 G: 17 POWDER, FOR SOLUTION ORAL at 10:16

## 2022-11-01 RX ADMIN — CALCIUM CARBONATE-VITAMIN D TAB 500 MG-200 UNIT 1 TABLET: 500-200 TAB at 10:16

## 2022-11-01 RX ADMIN — ONDANSETRON 4 MG: 2 INJECTION INTRAMUSCULAR; INTRAVENOUS at 19:58

## 2022-11-01 RX ADMIN — SODIUM CHLORIDE 150 ML/HR: 9 INJECTION, SOLUTION INTRAVENOUS at 22:55

## 2022-11-01 RX ADMIN — SODIUM CHLORIDE 150 ML/HR: 9 INJECTION, SOLUTION INTRAVENOUS at 07:57

## 2022-11-01 RX ADMIN — ONDANSETRON 4 MG: 2 INJECTION INTRAMUSCULAR; INTRAVENOUS at 00:25

## 2022-11-01 NOTE — PROGRESS NOTES
[facility] Medicine Services  PROGRESS NOTE    Patient Name: Iliana Reyes  : 1965  MRN: [id number]    Date of Admission: 10/31/2022  Primary Care Physician: Minda Sanchez APRN    Subjective   Subjective     CC:  Follow-up right flank pain    HPI:  Nausea better today.  Right-sided pain slightly improved but still persists.  Patient is pleased with her progress.  She denies any new complaints.  She has her daughter at the bedside.  We discussed her treatment plan and all the results so far.    Review of Systems  No current fevers or chills  No current shortness of breath or cough  No current chest pain or palpitations      Objective   Objective     Vital Signs:   Temp:  [97.5 °F (36.4 °C)-98.2 °F (36.8 °C)] 98.2 °F (36.8 °C)  Heart Rate:  [75-84] 75  Resp:  [14-16] 14  BP: ()/(51-79) 101/56        Physical Exam:  Constitutional:Awake, alert  HENT: NCAT, mucous membranes moist, neck supple  Respiratory: No cough or wheezes, respiratory effort normal, nonlabored breathing   Cardiovascular: normal radial pulses, regular rate  Gastrointestinal: Right-sided abdominal tenderness without guarding, soft, no hepatomegaly, nondistended  Musculoskeletal: Thin, BMI is 20, otherwise normal musculature for age, no lower extremity edema  Psychiatric: Appropriate affect, cooperative, conversational  Neurologic: No slurred speech or facial droop, follows commands  Skin: No rashes or jaundice, warm      Results Reviewed:  Results from last 7 days   Lab Units 22  0850 10/31/22  1114   WBC 10*3/mm3 13.88* 19.26*   HEMOGLOBIN g/dL 11.4* 14.0   HEMATOCRIT % 33.5* 41.8   PLATELETS 10*3/mm3 182 236     Results from last 7 days   Lab Units 10/31/22  1114   SODIUM mmol/L 138   POTASSIUM mmol/L 3.7   CHLORIDE mmol/L 98   CO2 mmol/L 29.0   BUN mg/dL 6   CREATININE mg/dL 0.51*   GLUCOSE mg/dL 126*   CALCIUM mg/dL 9.3   ALT (SGPT) U/L 7   AST (SGOT) U/L 18     Estimated Creatinine Clearance: 108.8 mL/min  (A) (by C-G formula based on SCr of 0.51 mg/dL (L)).    Microbiology Results Abnormal     Procedure Component Value - Date/Time    Urine Culture - Urine, Urine, Clean Catch [[id number]]  (Normal) Collected: 10/31/22 1117    Lab Status: Preliminary result Specimen: Urine, Clean Catch Updated: 11/01/22 0533     Urine Culture No growth          Imaging Results (Last 24 Hours)     Procedure Component Value Units Date/Time    CT Abdomen Pelvis With Contrast [[id number]] Collected: 10/31/22 1301     Updated: 10/31/22 1316    Narrative:      CT ABDOMEN PELVIS W CONTRAST-     Radiation dose reduction techniques were utilized, including automated  exposure control and exposure modulation based on body size.           Clinical: Abdominal pain     FINDINGS: Both kidneys demonstrate asymmetric satisfactory pattern of  enhancement and there are tiny renal cortical cysts bilaterally. There  is perinephric fat stranding along the lower pole of the right kidney  and proximal ureter. There could be mild uroepithelial thickening of the  proximal ureter. Very subtle pyelocaliectasis without ureterectasis and  no UPJ abnormality. Differential to include pyelonephritis or UPJ  obstruction without obvious stone or tumor seen. The left renal  collecting system is within normal limits. The mid and distal right  ureter have a satisfactory appearance, no stone seen.     CBD diameter is approximately 6 mm status post cholecystectomy similar  to MRCP from 2019. Intrahepatic ducts are slightly prominent as before.  No CBD filling defect identified.     The liver parenchyma is typical in appearance. The spleen and pancreas  are normal. Both adrenal glands have typical appearance. Diameter of the  aorta is within normal limits. The urinary bladder is normal. No bladder  stone or wall thickening seen. Vaginal cuff is typical post  hysterectomy. Minimal diverticulosis of the colon, no diverticulitis.  The appendix is normal. The small bowel is  satisfactory in appearance.  The stomach is collapsed, contour within normal limits, no duodenal  abnormality.     CONCLUSION:  1. Minimal right-sided perinephric fat stranding with nominal  pyelocaliectasis, no UPJ abnormality seen however there is subtle  induration and uroepithelial thickening of the proximal ureter,  pyelonephritis versus UPJ obstruction without obvious stone or tumor  seen on this examination.  2. Previous cholecystectomy. No change in the appearance of the biliary  tree, no indication of CBD filling defect.  3. Minimal diverticulosis of the colon. The bowel is otherwise within  normal limits.        This report was finalized on 10/31/2022 1:13 PM by Dr. Flash Smith M.D.             Results for orders placed in visit on 12/01/21    Adult Transthoracic Echo Complete W/ Cont if Necessary Per Protocol    Interpretation Summary  · Calculated left ventricular EF = 54.9% Estimated left ventricular EF was in agreement with the calculated left ventricular EF. Left ventricular systolic function is normal.  · Left ventricular diastolic function was normal.  · There is mild mitral valve prolapse of the anterior mitral leaflet.  · Calculated right ventricular systolic pressure from tricuspid regurgitation is 19.4 mmHg.  · There is a trivial pericardial effusion.      I have reviewed the medications:  Scheduled Meds:calcium 500 mg vitamin D 5 mcg (200 UT), 1 tablet, Oral, BID  cefTRIAXone, 1 g, Intravenous, Q24H  cholecalciferol, 1,000 Units, Oral, Daily  cycloSPORINE, 1 drop, Both Eyes, BID  estradiol, 1 patch, Transdermal, Once per day on Tue Thu  linaclotide, 145 mcg, Oral, QAM AC  polyethylene glycol, 17 g, Oral, Daily  sodium chloride, 10 mL, Intravenous, Q12H      Continuous Infusions:sodium chloride, 150 mL/hr, Last Rate: 150 mL/hr (11/01/22 0757)      PRN Meds:.•  acetaminophen **OR** acetaminophen **OR** acetaminophen  •  Morphine  •  nitroglycerin  •  ondansetron  •  sodium  chloride    Assessment & Plan   Assessment & Plan     Active Hospital Problems    Diagnosis  POA   • **Leukocytosis, unspecified type [D72.829]  Yes   • History of migraine [Z86.69]  Not Applicable   • Irritable bowel syndrome with constipation [K58.1]  Yes   • Dry eyes [H04.123]  Yes   • Pyelonephritis [N12]  Yes   • Hypotension [I95.9]  Yes      Resolved Hospital Problems   No resolved problems to display.        Brief Hospital Course to date:  Iliana Reyes is a 56 y.o. female presents the hospital with sepsis and right-sided abdominal/flank pain and was found on CT scan to have perinephric inflammation concerning for acute pyelonephritis.  Urinalysis did not show significant pyuria felt to be consistent with more of an atypical presentation.    Discussion/plan:  Clinically patient seems to be improved.  Sepsis resolving.  Decreased IV fluid.  Continue ceftriaxone.  Follow-up on urology recommendations.  It is unusual to have pyelonephritis without pyuria but seems to clinically be consistent with pyelonephritis type presentation and findings on examination.  Patient appears to be responding to treatment.  Encourage oral intake.  Can bolus as needed if further hypotension were to occur.  Greater than 35 minutes spent in chart review, coordination of care, and counseling.  20 minutes spent at the bedside counseling patient regarding treatment plan and reviewing findings    Pyelonephritis with secondary sepsis:  Ceftriaxone.  Urology.  Improving.    Hypotension:  Due to sepsis.  Treated with IV fluid.  Resolved.  Monitor.    History of migraines:  Chronically treated with propranolol.  Propranolol held due to low blood pressure.  Monitor off this medication for now while infected.  Primary care follow-up and recommended to follow-up with neurologist    IBS with constipation: Continue Linzess.  MiraLAX.    History of dry eyes:  Continue chronic eyedrops.    DVT Prophylaxis: Mechanical and  ambulatory      Disposition: Home when better    CODE STATUS:   Code Status and Medical Interventions:   Ordered at: 10/31/22 1610     Code Status (Patient has no pulse and is not breathing):    CPR (Attempt to Resuscitate)     Medical Interventions (Patient has pulse or is breathing):    Full Support       Carl Servin MD  11/01/22

## 2022-11-01 NOTE — CONSULTS
FIRST UROLOGY CONSULT      Patient Identification:  NAME:  Iliana Reyes  Age:  56 y.o.   Sex:  female   :  1965   MRN:  [id number]       Chief complaint: Flank pain.      History of present illness:  H/o RUQ , R flank pain x 3 days. N/V. No F/C.  Ct scan mild pyelo R kidney. Minimal proximal ureteral dilation.  Feeling much better.  H/o renal stones.        Past medical history:  Past Medical History:   Diagnosis Date   • Leiva esophagus    • Chest pain    • Dyspepsia    • Gastric ulcer    • GERD (gastroesophageal reflux disease)    • Headache    • Irritable bowel syndrome    • Tubular adenoma of colon        Past surgical history:  Past Surgical History:   Procedure Laterality Date   • CHOLECYSTECTOMY      Dr. EDER Alba   • COLONOSCOPY  2016    polyp, tics, tubular adenoma   • COLONOSCOPY  2019    normal ileum, diverticulosis, NBIH, TAx1   • ENDOSCOPY  2016    gastric ulcer w/clean base, acute gastritis. Leiva's esophagus   • ENDOSCOPY  2019    normal esophagus/duodenum, acute gastritis   • HYSTERECTOMY     • OOPHORECTOMY     • SHOULDER ARTHROSCOPY      Dr. Plunkett   • UPPER GASTROINTESTINAL ENDOSCOPY  2016       Allergies:  Amitiza [lubiprostone], Codeine, and Pantoprazole    Home medications:  Medications Prior to Admission   Medication Sig Dispense Refill Last Dose   • Calcium-Magnesium-Vitamin D (CALCIUM 500 PO) Take 1 tablet by mouth 2 (Two) Times a Day.   Past Week   • cholecalciferol (VITAMIN D3) 25 MCG (1000 UT) tablet Take 1,000 Units by mouth Daily.   Past Week   • estradiol (VIVELLE-DOT) 0.025 MG/24HR patch PLACE 1 PATCH ON THE SKIN AS DIRECTED BY PROVIDER TWICE A WEEK (Patient taking differently: Place 1 patch on the skin as directed by provider 2 (Two) Times a Week.  and ) 8 patch 11 Past Week   • linaclotide (Linzess) 145 MCG capsule capsule Take 1 capsule by mouth Every Morning Before Breakfast. 90 capsule 3 Past Week   • Omega-3  Fatty Acids (FISH OIL) 1000 MG capsule capsule Take 1 capsule by mouth Daily With Breakfast.   Past Week   • propranolol (INDERAL) 40 MG tablet Take 1 tablet by mouth 2 (Two) Times a Day. 180 tablet 1 Past Week   • Restasis 0.05 % ophthalmic emulsion Administer 1 drop to both eyes 2 (Two) Times a Day.   Past Week   • trimethoprim-polymyxin b (Polytrim) 63287-4.1 UNIT/ML-% ophthalmic solution Administer 1 drop to both eyes Every 4 (Four) Hours. 10 mL 0 Past Week   • diphenhydrAMINE (BENADRYL) 25 mg Take 25 mg by mouth every 6 (six) hours as needed for itching.   More than a month        Hospital medications:  calcium 500 mg vitamin D 5 mcg (200 UT), 1 tablet, Oral, BID  cefTRIAXone, 1 g, Intravenous, Q24H  cholecalciferol, 1,000 Units, Oral, Daily  cycloSPORINE, 1 drop, Both Eyes, BID  estradiol, 1 patch, Transdermal, Once per day   linaclotide, 145 mcg, Oral, QAM AC  polyethylene glycol, 17 g, Oral, Daily  sodium chloride, 10 mL, Intravenous, Q12H      sodium chloride, 150 mL/hr, Last Rate: 150 mL/hr (22 1507)      •  acetaminophen **OR** acetaminophen **OR** acetaminophen  •  Morphine  •  nitroglycerin  •  ondansetron  •  sodium chloride    Family history:  Family History   Problem Relation Age of Onset   • Heart disease Mother    • Inflammatory bowel disease Mother    • Colon cancer Mother    • Colon polyps Mother    • Liver cancer Mother    • Ulcerative colitis Mother    • Cerebral aneurysm Father    • Crohn's disease Brother    • Colon polyps Brother    • Crohn's disease Cousin    • Crohn's disease Cousin    • Heart attack Brother 48   • Heart attack Brother 40       Social history:  Social History     Tobacco Use   • Smoking status: Former     Packs/day: 1.00     Years: 20.00     Pack years: 20.00     Types: Cigarettes     Quit date: 2016     Years since quittin.8   • Smokeless tobacco: Never   Vaping Use   • Vaping Use: Never used   Substance Use Topics   • Alcohol use: Yes      Alcohol/week: 2.0 standard drinks     Types: 2 Glasses of wine per week     Comment: occ   • Drug use: No       Review of systems:      Positive for:  Flank pain.    Negative for:  Fever.      Objective:  TMax 24 hours:   Temp (24hrs), Av °F (36.7 °C), Min:97.5 °F (36.4 °C), Max:98.6 °F (37 °C)      Vitals Ranges:   Temp:  [97.5 °F (36.4 °C)-98.6 °F (37 °C)] 98.6 °F (37 °C)  Heart Rate:  [75-79] 79  Resp:  [14-16] 16  BP: ()/(51-67) 112/66    Intake/Output Last 3 shifts:  I/O last 3 completed shifts:  In: 2100 [IV Piggyback:2100]  Out: -      Physical Exam:    General Appearance:    Alert, cooperative, NAD   HEENT:    No trauma, pupils reactive, hearing intact   Back:     No CVA tenderness   Lungs:     Respirations unlabored, no wheezing    Heart:    RRR.   Abdomen:     Soft, NDNT, no masses, no guarding   :    Pelvic not performed, bladder nondistended and nontender   Extremities:     Lymphatic:   No neck or groin LAD   Skin:   No bleeding, bruising or rashes   Neuro/Psych:   Orientation intact, mood/affect pleasant, no focal findings       Results review:   I reviewed the patient's new clinical results.    Data review:  Lab Results (last 24 hours)     Procedure Component Value Units Date/Time    Blood Culture - Blood, Arm, Right [[id number]]  (Normal) Collected: 10/31/22 1415    Specimen: Blood from Arm, Right Updated: 22 1446     Blood Culture No growth at 24 hours    Blood Culture - Blood, Arm, Left [[id number]]  (Normal) Collected: 10/31/22 1416    Specimen: Blood from Arm, Left Updated: 22 1431     Blood Culture No growth at 24 hours    Comprehensive Metabolic Panel [[id number]]  (Abnormal) Collected: 22 0850    Specimen: Blood Updated: 22 1029     Glucose 71 mg/dL      BUN 5 mg/dL      Creatinine 0.36 mg/dL      Sodium 140 mmol/L      Potassium 3.2 mmol/L      Chloride 106 mmol/L      CO2 23.0 mmol/L      Calcium 8.5 mg/dL      Total Protein 5.5 g/dL      Albumin 3.40 g/dL       ALT (SGPT) 12 U/L      AST (SGOT) 19 U/L      Alkaline Phosphatase 76 U/L      Total Bilirubin 0.8 mg/dL      Globulin 2.1 gm/dL      A/G Ratio 1.6 g/dL      BUN/Creatinine Ratio 13.9     Anion Gap 11.0 mmol/L      eGFR 120.1 mL/min/1.73      Comment: National Kidney Foundation and American Society of Nephrology (ASN) Task Force recommended calculation based on the Chronic Kidney Disease Epidemiology Collaboration (CKD-EPI) equation refit without adjustment for race.       Narrative:      GFR Normal >60  Chronic Kidney Disease <60  Kidney Failure <15      Lactic Acid, Plasma [[id number]]  (Normal) Collected: 11/01/22 0850    Specimen: Blood Updated: 11/01/22 1023     Lactate 1.3 mmol/L     CBC Auto Differential [[id number]]  (Abnormal) Collected: 11/01/22 0850    Specimen: Blood Updated: 11/01/22 0953     WBC 13.88 10*3/mm3      RBC 3.75 10*6/mm3      Hemoglobin 11.4 g/dL      Hematocrit 33.5 %      MCV 89.3 fL      MCH 30.4 pg      MCHC 34.0 g/dL      RDW 11.5 %      RDW-SD 37.5 fl      MPV 12.2 fL      Platelets 182 10*3/mm3      Neutrophil % 81.0 %      Lymphocyte % 11.2 %      Monocyte % 7.0 %      Eosinophil % 0.1 %      Basophil % 0.1 %      Immature Grans % 0.6 %      Neutrophils, Absolute 11.23 10*3/mm3      Lymphocytes, Absolute 1.56 10*3/mm3      Monocytes, Absolute 0.97 10*3/mm3      Eosinophils, Absolute 0.02 10*3/mm3      Basophils, Absolute 0.02 10*3/mm3      Immature Grans, Absolute 0.08 10*3/mm3      nRBC 0.0 /100 WBC     Urine Culture - Urine, Urine, Clean Catch [[id number]]  (Normal) Collected: 10/31/22 1117    Specimen: Urine, Clean Catch Updated: 11/01/22 0533     Urine Culture No growth           Imaging:  Imaging Results (Last 24 Hours)     ** No results found for the last 24 hours. **             Assessment:       Leukocytosis, unspecified type    Pyelonephritis    Hypotension    History of migraine    Irritable bowel syndrome with constipation    Dry eyes    Pyelonephritis.      Plan:     Pt  improving. Minimal proximal ureteral dilation secondary to infection.  No need for stent.  Recommend 2 week course antibx.    F/u Dr. Castro 2 weeks BOUBACAR.      Jed Castro MD  11/01/22  16:28 EDT

## 2022-11-01 NOTE — CASE MANAGEMENT/SOCIAL WORK
Discharge Planning Assessment  [facility]     Patient Name: Iliana Reyes  MRN: [id number]  Today's Date: 11/1/2022    Admit Date: 10/31/2022    Plan: Home with family support   Discharge Needs Assessment     Row Name 11/01/22 0924       Living Environment    People in Home spouse    Name(s) of People in Home Star Reyes  [phone]    Current Living Arrangements home    Primary Care Provided by self    Provides Primary Care For no one    Family Caregiver if Needed spouse;child(daniela), adult    Family Caregiver Names Star Reyes  [phone]  or daughter.    Quality of Family Relationships supportive;involved    Able to Return to Prior Arrangements yes       Resource/Environmental Concerns    Transportation Concerns none       Transition Planning    Patient/Family Anticipates Transition to home with family    Patient/Family Anticipated Services at Transition none    Transportation Anticipated family or friend will provide       Discharge Needs Assessment    Readmission Within the Last 30 Days no previous admission in last 30 days    Equipment Currently Used at Home none    Concerns to be Addressed no discharge needs identified;denies needs/concerns at this time    Anticipated Changes Related to Illness none    Equipment Needed After Discharge none    Provided Post Acute Provider List? N/A    N/A Provider List Comment No needs identified    Provided Post Acute Provider Quality & Resource List? N/A               Discharge Plan     Row Name 11/01/22 0927       Plan    Plan Home with family support    Patient/Family in Agreement with Plan yes    Plan Comments Met with patient and daughter at bedside. Patient granted me permission to speak with her while family remained in the room. Face sheet verified. Prior to admission patient was able to perform her own ADL’s and denies using any special or adaptive equipment.  Patient uses the [facility] in [place], denies any issues affording or remembering to  take her medications. Patient is agreeable to using [facility]. Patient does not have POA or living will on file. Discharge plans are to return home with family support. Family to transport. Will continue to monitor for new or changing discharge needs.  Lora RAY RN CCP              Continued Care and Services - Admitted Since 10/31/2022    Coordination has not been started for this encounter.       Expected Discharge Date and Time     Expected Discharge Date Expected Discharge Time    Nov 3, 2022          Demographic Summary     Row Name 11/01/22 0923       General Information    Admission Type inpatient    Arrived From emergency department    Referral Source admission list    Reason for Consult discharge planning    Preferred Language English       Contact Information    Permission Granted to Share Info With family/designee  Star Reyes  [phone]               Functional Status     Row Name 11/01/22 0923       Functional Status    Usual Activity Tolerance excellent       Functional Status, IADL    Medications independent    Meal Preparation independent    Housekeeping independent    Laundry independent    Shopping independent       Mental Status    General Appearance WDL WDL       Mental Status Summary    Recent Changes in Mental Status/Cognitive Functioning no changes       Employment/    Employment Status employed full-time               Psychosocial    No documentation.                Abuse/Neglect    No documentation.                Legal    No documentation.                Substance Abuse    No documentation.                Patient Forms    No documentation.                   Lora Restrepo RN

## 2022-11-01 NOTE — PAYOR COMM NOTE
"Iliana Stevens (56 y.o. Female)     PLEASE SEE ATTACHED FOR INPT AUTH. [facility] IS DRG WITH LOCAL [place]    REF#[id number]    PLEASE CALL   OR  [phone]    THANK YOU    MARIAELENA LARA LPN [facility]    Date of Birth   1965    Social Security Number       Address   [address] [place] [address]    Home Phone   [phone]    MRN   [id number]       [place]   Patient Refused    Marital Status                               Admission Date   10/31/22    Admission Type   Emergency    Admitting Provider   Edmar Aly MD    Attending Provider   Carl Servin MD    Department, Room/Bed   [facility] OBSERVATION, 131/1       Discharge Date       Discharge Disposition       Discharge Destination                               Attending Provider: Carl Servin MD    Allergies: Amitiza [Lubiprostone], Codeine, Pantoprazole    Isolation: None   Infection: None   Code Status: CPR    Ht: 165.1 cm (65\")   Wt: 55.3 kg (122 lb)    Admission Cmt: None   Principal Problem: Leukocytosis, unspecified type [D72.829]                 Active Insurance as of 10/31/2022     Primary Coverage     Payor Plan Insurance Group Employer/Plan Group    [employer] BLUE [employer]OSS [employer]UE [employer]OSS BLUE [employer] PPO [id number]     Payor Plan Address Payor Plan Phone Number Payor Plan Fax Number Effective Dates    [address] [phone]  9/15/2009 - None Entered    [place] [address]       Subscriber Name Subscriber Birth Date Member ID       STAR STEVENS [birth year]1965 [id number]                 Emergency Contacts      (Rel.) Home Phone Work Phone Mobile Phone    Star Stevens (Spouse) [phone] -- --            [place]: NPI [id number]  Tax ID [id number]     History & Physical      Edmar Aly MD at 10/31/22 1605          Internal medicine history and physical  INTERNAL MEDICINE   [facility]       Patient Identification:  Name: Iliana LANE " Eric  Age: 56 y.o.  Sex: female  :  1965  MRN: [id number]                   Primary Care Physician: Minda Sanchez APRN                               Date of admission:10/31/2022    Chief Complaint: Progressive upper abdominal and right-sided abdominal pain over the course of last couple days with associated nausea and vomiting.    History of Present Illness:   Patient is a 56-year-old female with past medical history remarkable for peptic ulcer disease, gastroesophageal reflux disease migraine headaches and Leiva's esophagus who was recently evaluated by her primary care provider on 10/27/2022 for her annual checkup.  At that time she was doing fine and was noted that her headaches are improved after introduction of propranolol.  According the patient couple days ago she started noticing nausea and vomiting and not feeling very well and started noticing abdominal discomfort which was mainly in the upper abdomen going around to her right side towards her back.  Earlier today she called her primary care provider about the symptoms and wanted to be seen but was directed to the emergency room for further care.  She thought that she may be having appendicitis.  Work-up in the emergency room revealed WBC count of 19,000, lactate of 1.9 normal lipase and amylase and essentially unremarkable urinalysis though CT scan of the abdomen and pelvis revealed minimal right-sided perinephric stranding and slight enlargement of right-sided collecting system and subtle induration and uroepithelial thickening of the proximal ureter concerning for pyelonephritis versus UPJ obstruction.  Blood cultures were drawn patient has been started on IV antibiotics.  According to the care providers in the emergency room urology service have been consulted.  Patient is currently feeling somewhat better after receiving pain medication and nausea medication.      Past Medical History:  Past Medical History:   Diagnosis Date    • Leiva esophagus    • Chest pain 2016   • Dyspepsia    • Gastric ulcer    • GERD (gastroesophageal reflux disease)    • Headache    • Irritable bowel syndrome    • Tubular adenoma of colon      Past Surgical History:  Past Surgical History:   Procedure Laterality Date   • CHOLECYSTECTOMY      Dr. EDER Alba   • COLONOSCOPY  05/18/2016    polyp, tics, tubular adenoma   • COLONOSCOPY  06/05/2019    normal ileum, diverticulosis, NBIH, TAx1   • ENDOSCOPY  05/18/2016    gastric ulcer w/clean base, acute gastritis. Leiva's esophagus   • ENDOSCOPY  06/05/2019    normal esophagus/duodenum, acute gastritis   • HYSTERECTOMY     • OOPHORECTOMY     • SHOULDER ARTHROSCOPY      Dr. Plunkett   • UPPER GASTROINTESTINAL ENDOSCOPY  05/18/2016      Home Meds:  (Not in a hospital admission)    Current Meds:     Current Facility-Administered Medications:   •  sodium chloride 0.9 % infusion, 100 mL/hr, Intravenous, Continuous, Berto Longoria MD, Last Rate: 100 mL/hr at 10/31/22 1534, 100 mL/hr at 10/31/22 1534    Current Outpatient Medications:   •  Calcium-Magnesium-Vitamin D (CALCIUM 500 PO), Take 1 tablet by mouth 2 (Two) Times a Day., Disp: , Rfl:   •  cholecalciferol (VITAMIN D3) 25 MCG (1000 UT) tablet, Take 1,000 Units by mouth Daily., Disp: , Rfl:   •  estradiol (VIVELLE-DOT) 0.025 MG/24HR patch, PLACE 1 PATCH ON THE SKIN AS DIRECTED BY PROVIDER TWICE A WEEK (Patient taking differently: Place 1 patch on the skin as directed by provider 2 (Two) Times a Week. Tuesdays and Thursdays), Disp: 8 patch, Rfl: 11  •  linaclotide (Linzess) 145 MCG capsule capsule, Take 1 capsule by mouth Every Morning Before Breakfast., Disp: 90 capsule, Rfl: 3  •  Omega-3 Fatty Acids (FISH OIL) 1000 MG capsule capsule, Take 1 capsule by mouth Daily With Breakfast., Disp: , Rfl:   •  propranolol (INDERAL) 40 MG tablet, Take 1 tablet by mouth 2 (Two) Times a Day., Disp: 180 tablet, Rfl: 1  •  Restasis 0.05 % ophthalmic emulsion, Administer 1 drop to  "both eyes 2 (Two) Times a Day., Disp: , Rfl:   •  trimethoprim-polymyxin b (Polytrim) 09060-7.1 UNIT/ML-% ophthalmic solution, Administer 1 drop to both eyes Every 4 (Four) Hours., Disp: 10 mL, Rfl: 0  •  diphenhydrAMINE (BENADRYL) 25 mg, Take 25 mg by mouth every 6 (six) hours as needed for itching., Disp: , Rfl:   Allergies:  Allergies   Allergen Reactions   • Amitiza [Lubiprostone] Other (See Comments)     Migraines   • Codeine Hives   • Pantoprazole Nausea And Vomiting     Social History:   Social History     Tobacco Use   • Smoking status: Former     Packs/day: 1.00     Years: 20.00     Pack years: 20.00     Types: Cigarettes     Quit date:      Years since quittin.8   • Smokeless tobacco: Never   Substance Use Topics   • Alcohol use: Yes     Alcohol/week: 2.0 standard drinks     Types: 2 Glasses of wine per week     Comment: occ      Family History:  Family History   Problem Relation Age of Onset   • Heart disease Mother    • Inflammatory bowel disease Mother    • Colon cancer Mother    • Colon polyps Mother    • Liver cancer Mother    • Ulcerative colitis Mother    • Cerebral aneurysm Father    • Crohn's disease Brother    • Colon polyps Brother    • Crohn's disease Cousin    • Crohn's disease Cousin    • Heart attack Brother 48   • Heart attack Brother 40          Review of Systems  See history of present illness and past medical history.    Constitutional: Remarkable for no fever or chills  Cardiovascular: Remarkable for no chest pain or shortness of breath  GI: Remarkable for nausea and vomiting and decreased appetite, abdominal discomfort going to her right side  : Remarkable for no significant burning in urination frequency urgency  Musculoskeletal: Remarkable for no new joint aches and pain  Neurological: Remarkable for no loss of consciousness or continence.  Remainder of ROS is negative.      Vitals:   /56   Pulse 76   Temp 98.3 °F (36.8 °C) (Tympanic)   Resp 16   Ht 165.1 cm (65\") "   Wt 55.3 kg (122 lb)   LMP  (LMP Unknown) Comment: complete  SpO2 100%   BMI 20.30 kg/m²   I/O:     Intake/Output Summary (Last 24 hours) at 10/31/2022 1605  Last data filed at 10/31/2022 1503  Gross per 24 hour   Intake 2100 ml   Output --   Net 2100 ml     Exam:  Patient is examined using the personal protective equipment as per guidelines from infection control for this particular patient as enacted.  Hand washing was performed before and after patient interaction.  General Appearance:    Alert, cooperative, no distress, appears stated age   Head:    Normocephalic, without obvious abnormality, atraumatic   Eyes:    PERRL, conjunctiva/corneas clear, EOM's intact, both eyes   Ears:    Normal external ear canals, both ears   Nose:   Nares normal, septum midline, mucosa normal, no drainage    or sinus tenderness   Throat:   Lips, tongue, gums normal; oral mucosa pink and moist   Neck:   Supple, symmetrical, trachea midline, no adenopathy;     thyroid:  no enlargement/tenderness/nodules; no carotid    bruit or JVD   Back:     Symmetric, no curvature, ROM normal, right CVA tenderness   Lungs:     Clear to auscultation bilaterally, respirations unlabored   Chest Wall:    No tenderness or deformity    Heart:    Regular rate and rhythm, S1 and S2 normal, no murmur, rub   or gallop   Abdomen:    Epigastric and right upper quadrant tenderness and right CVA tenderness but no guarding rigidity or rebound noted   Extremities:   Extremities normal, atraumatic, no cyanosis or edema   Pulses:   Pulses palpable in all extremities; symmetric all extremities   Skin:   Skin color normal, Skin is warm and dry,  no rashes or palpable lesions   Neurologic:  Alert and oriented and grossly nonfocal       Data Review:      I reviewed the patient's new clinical results.  Results from last 7 days   Lab Units 10/31/22  1114   WBC 10*3/mm3 19.26*   HEMOGLOBIN g/dL 14.0   PLATELETS 10*3/mm3 236     Results from last 7 days   Lab Units  10/31/22  1114   SODIUM mmol/L 138   POTASSIUM mmol/L 3.7   CHLORIDE mmol/L 98   CO2 mmol/L 29.0   BUN mg/dL 6   CREATININE mg/dL 0.51*   CALCIUM mg/dL 9.3   GLUCOSE mg/dL 126*     No radiology results for the last 30 days.  Microbiology Results (last 10 days)     ** No results found for the last 240 hours. **          Assessment:  Active Hospital Problems    Diagnosis  POA   • **Leukocytosis, unspecified type [D72.829]  Yes   • Pyelonephritis [N12]  Unknown   • Hypotension [I95.9]  Unknown       Plan: See admitting orders  Her lack of abnormal findings in urinalysis and subtle changes on the CT scan argues against classical diagnosis of pyelonephritis but there is no other alternate explanation given the physical examination and hence we will treat her as it is acute pyelonephritis with IV fluids and IV antibiotics while following up on blood cultures  Continue with IV hydration and monitor her blood pressure and hold her propranolol  Urology consultation and further management as her condition evolves.  Edmar Aly MD   10/31/2022  16:05 EDT  Much of this encounter note is an electronic transcription/translation of spoken language to printed text. The electronic translation of spoken language may permit erroneous, or at times, nonsensical words or phrases to be inadvertently transcribed; Although I have reviewed the note for such errors, some may still exist      Electronically signed by Edmar Aly MD at 11/01/22 0520          Emergency Department Notes      Degonda, Janet, RN at 10/31/22 0943        Right side abd pain and vomit x 2 days    Patient was placed in face mask during first look triage.  Patient was wearing a face mask throughout encounter.  I wore personal protective equipment throughout the encounter.  Hand hygiene was performed before and after patient encounter.       Electronically signed by Degonda, Janet, RN at 10/31/22 0984     Jl Villanueva PA at 10/31/22 1036     Attestation signed by  Berto Longoria MD at 10/31/22 1823        SHARED APC FACE TO FACE: This visit was performed by both the physician and an APC. I personally evaluated and examined the patient. I performed the entirety of the physical exam and I documented this exam in this attestation or in accompanying documentation.    Berto Longoria MD 10/31/2022 18:23 EDT                          EMERGENCY DEPARTMENT ENCOUNTER    Room Number:  22/22  Date of encounter:  10/31/2022  PCP: Minda Sanchez APRN  Historian: Patient  Full history not obtainable due to: None    HPI:  Chief Complaint: Abdominal pain    Context: Iliana Reyes is a 56 y.o. female with a PMH significant for Leiva's esophagus, dyspepsia, gastric ulcer, GERD, irritable bowel syndrome who presents to the ED c/o abdominal pain.  For the past 2 days the patient has been experiencing progressively worsening epigastric and right upper quadrant abdominal pain that she describes as sharp and nonradiating.  She has had associated nausea and vomiting with anything she tries to eat or drink since that time.  She denies fever, chills.  No recent abdominal surgeries or antibiotic use.  She does have a remote history of cholecystectomy.  No changes to bowel habits or urination.  She attempted to use Pepto-Bismol and Tums at home without relief.      MEDICAL RECORD REVIEW:    Upon review of the medical record it appears the patient's most recent evaluation was in the internal medicine office on October 27, 2022 for annual physical exam.      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 EDER Alba   • COLONOSCOPY  2016    polyp, tics, tubular adenoma   • COLONOSCOPY  2019    normal ileum, diverticulosis, NBIH, TAx1   • ENDOSCOPY  2016    gastric ulcer w/clean base, acute gastritis. Leiva's esophagus   • ENDOSCOPY  2019    normal esophagus/duodenum, acute gastritis   • HYSTERECTOMY     • OOPHORECTOMY     • SHOULDER ARTHROSCOPY      Dr. Plunkett   • UPPER GASTROINTESTINAL ENDOSCOPY  2016         FAMILY HISTORY  Family History   Problem Relation Age of Onset   • Heart disease Mother    • Inflammatory bowel disease Mother    • Colon cancer Mother    • Colon polyps Mother    • Liver cancer Mother    • Ulcerative colitis Mother    • Cerebral aneurysm Father    • Crohn's disease Brother    • Colon polyps Brother    • Crohn's disease Cousin    • Crohn's disease Cousin    • Heart attack Brother 48   • Heart attack Brother 40         SOCIAL HISTORY  Social History     Socioeconomic History   • Marital status:    Tobacco Use   • Smoking status: Former     Packs/day: 1.00     Years: 20.00     Pack years: 20.00     Types: Cigarettes     Quit date:      Years since quittin.8   • Smokeless tobacco: Never   Vaping Use   • Vaping Use: Never used   Substance and Sexual Activity   • Alcohol use: Yes     Alcohol/week: 2.0 standard drinks     Types: 2 Glasses of wine per week     Comment: occ   • Drug use: No   • Sexual activity: Yes     Partners: Male     Birth control/protection: None         ALLERGIES  Amitiza [lubiprostone], Codeine, and Pantoprazole        REVIEW OF SYSTEMS    All systems reviewed and marked as negative except as listed in HPI     PHYSICAL EXAM    I have reviewed the triage vital signs and nursing notes.    ED Triage Vitals [10/31/22 0944]   Temp Heart Rate Resp BP SpO2   98.3 °F (36.8  °C) 113 16 -- 98 %      Temp src Heart Rate Source Patient Position BP Location FiO2 (%)   Tympanic Monitor -- -- --       Physical Exam  Constitutional:       General: She is not in acute distress.     Appearance: She is well-developed.   HENT:      Head: Normocephalic and atraumatic.   Eyes:      General: No scleral icterus.     Conjunctiva/sclera: Conjunctivae normal.   Neck:      Trachea: No tracheal deviation.   Cardiovascular:      Rate and Rhythm: Regular rhythm. Tachycardia present.   Pulmonary:      Effort: Pulmonary effort is normal.      Breath sounds: Normal breath sounds.   Abdominal:      Palpations: Abdomen is soft.      Tenderness: There is abdominal tenderness in the right upper quadrant and epigastric area.   Musculoskeletal:         General: No deformity.      Cervical back: Normal range of motion.   Lymphadenopathy:      Cervical: No cervical adenopathy.   Skin:     General: Skin is warm and dry.   Neurological:      Mental Status: She is alert and oriented to person, place, and time.   Psychiatric:         Behavior: Behavior normal.         Vital signs and nursing notes reviewed.            LAB RESULTS  Recent Results (from the past 24 hour(s))   Comprehensive Metabolic Panel    Collection Time: 10/31/22 11:14 AM    Specimen: Blood   Result Value Ref Range    Glucose 126 (H) 65 - 99 mg/dL    BUN 6 6 - 20 mg/dL    Creatinine 0.51 (L) 0.57 - 1.00 mg/dL    Sodium 138 136 - 145 mmol/L    Potassium 3.7 3.5 - 5.2 mmol/L    Chloride 98 98 - 107 mmol/L    CO2 29.0 22.0 - 29.0 mmol/L    Calcium 9.3 8.6 - 10.5 mg/dL    Total Protein 6.9 6.0 - 8.5 g/dL    Albumin 4.50 3.50 - 5.20 g/dL    ALT (SGPT) 7 1 - 33 U/L    AST (SGOT) 18 1 - 32 U/L    Alkaline Phosphatase 74 39 - 117 U/L    Total Bilirubin 1.1 0.0 - 1.2 mg/dL    Globulin 2.4 gm/dL    A/G Ratio 1.9 g/dL    BUN/Creatinine Ratio 11.8 7.0 - 25.0    Anion Gap 11.0 5.0 - 15.0 mmol/L    eGFR 110.4 >60.0 mL/min/1.73   Lipase    Collection Time: 10/31/22  11:14 AM    Specimen: Blood   Result Value Ref Range    Lipase 20 13 - 60 U/L   Lactic Acid, Plasma    Collection Time: 10/31/22 11:14 AM    Specimen: Blood   Result Value Ref Range    Lactate 1.9 0.5 - 2.0 mmol/L   Magnesium    Collection Time: 10/31/22 11:14 AM    Specimen: Blood   Result Value Ref Range    Magnesium 1.6 1.6 - 2.6 mg/dL   CBC Auto Differential    Collection Time: 10/31/22 11:14 AM    Specimen: Blood   Result Value Ref Range    WBC 19.26 (H) 3.40 - 10.80 10*3/mm3    RBC 4.51 3.77 - 5.28 10*6/mm3    Hemoglobin 14.0 12.0 - 15.9 g/dL    Hematocrit 41.8 34.0 - 46.6 %    MCV 92.7 79.0 - 97.0 fL    MCH 31.0 26.6 - 33.0 pg    MCHC 33.5 31.5 - 35.7 g/dL    RDW 11.7 (L) 12.3 - 15.4 %    RDW-SD 39.8 37.0 - 54.0 fl    MPV 12.6 (H) 6.0 - 12.0 fL    Platelets 236 140 - 450 10*3/mm3    Neutrophil % 87.0 (H) 42.7 - 76.0 %    Lymphocyte % 6.3 (L) 19.6 - 45.3 %    Monocyte % 5.9 5.0 - 12.0 %    Eosinophil % 0.0 (L) 0.3 - 6.2 %    Basophil % 0.2 0.0 - 1.5 %    Immature Grans % 0.6 (H) 0.0 - 0.5 %    Neutrophils, Absolute 16.76 (H) 1.70 - 7.00 10*3/mm3    Lymphocytes, Absolute 1.22 0.70 - 3.10 10*3/mm3    Monocytes, Absolute 1.13 (H) 0.10 - 0.90 10*3/mm3    Eosinophils, Absolute 0.00 0.00 - 0.40 10*3/mm3    Basophils, Absolute 0.03 0.00 - 0.20 10*3/mm3    Immature Grans, Absolute 0.12 (H) 0.00 - 0.05 10*3/mm3    nRBC 0.0 0.0 - 0.2 /100 WBC   Urinalysis With Microscopic If Indicated (No Culture) - Urine, Clean Catch    Collection Time: 10/31/22 11:17 AM    Specimen: Urine, Clean Catch   Result Value Ref Range    Color, UA Yellow Yellow, Straw    Appearance, UA Clear Clear    pH, UA 8.0 5.0 - 8.0    Specific Gravity, UA 1.009 1.005 - 1.030    Glucose, UA Negative Negative    Ketones, UA Negative Negative    Bilirubin, UA Negative Negative    Blood, UA Negative Negative    Protein, UA Negative Negative    Leuk Esterase, UA Negative Negative    Nitrite, UA Negative Negative    Urobilinogen, UA 0.2 E.U./dL 0.2 - 1.0  E.U./dL       Ordered the above labs and independently reviewed the results.        RADIOLOGY  CT Abdomen Pelvis With Contrast    Result Date: 10/31/2022  CT ABDOMEN PELVIS W CONTRAST-  Radiation dose reduction techniques were utilized, including automated exposure control and exposure modulation based on body size.    Clinical: Abdominal pain  FINDINGS: Both kidneys demonstrate asymmetric satisfactory pattern of enhancement and there are tiny renal cortical cysts bilaterally. There is perinephric fat stranding along the lower pole of the right kidney and proximal ureter. There could be mild uroepithelial thickening of the proximal ureter. Very subtle pyelocaliectasis without ureterectasis and no UPJ abnormality. Differential to include pyelonephritis or UPJ obstruction without obvious stone or tumor seen. The left renal collecting system is within normal limits. The mid and distal right ureter have a satisfactory appearance, no stone seen.  CBD diameter is approximately 6 mm status post cholecystectomy similar to MRCP from 2019. Intrahepatic ducts are slightly prominent as before. No CBD filling defect identified.  The liver parenchyma is typical in appearance. The spleen and pancreas are normal. Both adrenal glands have typical appearance. Diameter of the aorta is within normal limits. The urinary bladder is normal. No bladder stone or wall thickening seen. Vaginal cuff is typical post hysterectomy. Minimal diverticulosis of the colon, no diverticulitis. The appendix is normal. The small bowel is satisfactory in appearance. The stomach is collapsed, contour within normal limits, no duodenal abnormality.  CONCLUSION: 1. Minimal right-sided perinephric fat stranding with nominal pyelocaliectasis, no UPJ abnormality seen however there is subtle induration and uroepithelial thickening of the proximal ureter, pyelonephritis versus UPJ obstruction without obvious stone or tumor seen on this examination. 2. Previous  cholecystectomy. No change in the appearance of the biliary tree, no indication of CBD filling defect. 3. Minimal diverticulosis of the colon. The bowel is otherwise within normal limits.   This report was finalized on 10/31/2022 1:13 PM by Dr. Flash Smith M.D.        I ordered the above noted radiological studies. Independently reviewed by me and discussed with radiologist.  See dictation above for official radiology interpretation.      PROCEDURES    Procedures        MEDICATIONS GIVEN IN ER    Medications   cefTRIAXone (ROCEPHIN) 1 g in sodium chloride 0.9 % 100 mL IVPB-VTB (1 g Intravenous New Bag 10/31/22 1417)   lactated ringers bolus 1,000 mL (1,000 mL Intravenous New Bag 10/31/22 1415)   sodium chloride 0.9 % bolus 1,000 mL (0 mL Intravenous Stopped 10/31/22 1235)   ondansetron (ZOFRAN) injection 4 mg (4 mg Intravenous Given 10/31/22 1133)   morphine injection 4 mg (4 mg Intravenous Given 10/31/22 1134)   aluminum-magnesium hydroxide-simethicone (MAALOX MAX) 400-400-40 MG/5ML suspension 15 mL (15 mL Oral Given 10/31/22 1130)   Lidocaine Viscous HCl (XYLOCAINE) 2 % solution 15 mL (15 mL Mouth/Throat Given 10/31/22 1129)   iopamidol (ISOVUE-300) 61 % injection 100 mL (85 mL Intravenous Given 10/31/22 1253)   iopamidol (ISOVUE-300) 61 % injection 100 mL (85 mL Intravenous Given 10/31/22 1254)   HYDROmorphone (DILAUDID) injection 1 mg (1 mg Intravenous Given 10/31/22 1404)         PROGRESS, DATA ANALYSIS, CONSULTS, AND MEDICAL DECISION MAKING    All labs have been independently reviewed by me.  All radiology studies have been reviewed by me.   EKG's independently reviewed by me.  Discussion below represents my analysis of pertinent findings related to patient's condition, differential diagnosis, treatment plan and final disposition.    DIFFERENTIAL DIAGNOSIS INCLUDE BUT NOT LIMITED TO:     Differential diagnosis includes but is not limited to:  - Hepatobiliary pathology such as cholecystitis, cholangitis, and  symptomatic cholelithiasis  - Pancreatitis  - Dyspepsia  - Small bowel obstruction  - Appendicitis  - Diverticulitis  - UTI including pyelonephritis  - Ureteral stone  - Zoster  - Colitis, including infectious and ischemic  - Atypical ACS    ED Course as of 10/31/22 1429   Mon Oct 31, 2022   1210 WBC(!): 19.26 [DC]   1210 Hemoglobin: 14.0 [DC]   1210 Platelets: 236 [DC]   1210 Nitrite, UA: Negative [DC]   1210 Leukocytes, UA: Negative [DC]   1210 Ketones, UA: Negative [DC]   1319 CT Abdomen Pelvis With Contrast  reviewed [DC]   1354 I discussed with Dr. Almazan, urology, discussed patient's clinical course and findings today with the right CVA and abdominal pain, leukocytosis of 19,000, and abnormal CT findings of the kidney.  At this point he does not feel acute intervention necessary, he would recommend antibiotics and outpatient follow-up if patient is feeling well enough to go home.  Curiously, there is no evidence of UTI on the urinalysis provided [DC]   1402 Patient updated on findings and discussion with urology, at this point after discussion with the patient, we have decided to hospitalize her for further monitoring and management  [DC]   1427 I spoke with MD Jermain with Urology at this time regarding the patient.  I discussed work-up, results, concerns.  I discussed the consulting provider's desire for med surg admit     [DC-2]      ED Course User Index  [DC] Berto Longoria MD  [DC-2] Jl Villanueva PA       AS OF 14:21 EDT VITALS:    BP - (!) 87/52  HR - 80  TEMP - 98.3 °F (36.8 °C) (Tympanic)  02 SATS - 94%      DIAGNOSIS  Final diagnoses:   Leukocytosis, unspecified type   Abnormal CT scan, kidney   Flank pain   Nausea and vomiting, unspecified vomiting type   Hypotension, unspecified hypotension type         DISPOSITION  Admit    Pt masked in first look. I wore a surgical mask throughout my encounters with the pt. I performed hand hygiene on entry into the pt room and upon exit.     Dictated  utilizing Dragon dictation     Note Disclaimer: At [facility], we believe that sharing information builds trust and better relationships. You are receiving this note because you recently visited [facility]. It is possible you will see health information before a provider has talked with you about it. This kind of information can be easy to misunderstand. To help you fully understand what it means for your health, we urge you to discuss this note with your provider.      Jl Villanueva PA  10/31/22 1422       Jl Villanueva PA  10/31/22 1429      Electronically signed by Berto Longoria MD at 10/31/22 1823     Berto Longoria MD at 10/31/22 1328          Pt presents to the ED c/o  right-sided abdominal pain since Saturday constant in nature.  Has had nausea and vomiting.  Denies any measured fevers but has had subjective fevers.  Denies any urinary frequency, urgency, dysuria.  No change in bowel habits.  Has been using Pepto-Bismol and Tums without relief.     On exam,   General: No acute distress, nontoxic  HEENT: Mucous membranes moist, atraumatic, EOMI  Neck: Full ROM  Pulm: Symmetric chest rise, nonlabored, lungs CTAB  Cardiovascular: Regular rate and rhythm, intact distal pulses  GI: Soft, right CVA and right upper quadrant tenderness palpation but, diffusely tender in the abdomen overall, nondistended, no rebound, no guarding, bowel sounds diminished  MSK: Full ROM, no deformity  Skin: Warm, dry  Neuro: Awake, alert, oriented x 4, GCS 15, moving all extremities, no focal deficits  Psych: Calm, cooperative      N95, protective eye goggles, and gloves used during this encounter. Patient in surgical mask.      Plan:   ED Course as of 10/31/22 1807   Mon Oct 31, 2022   1210 WBC(!): 19.26 [DC]   1210 Hemoglobin: 14.0 [DC]   1210 Platelets: 236 [DC]   1210 Nitrite, UA: Negative [DC]   1210 Leukocytes, UA: Negative [DC]   1210 Ketones, UA: Negative [DC]   1319 CT Abdomen Pelvis With Contrast  reviewed  [DC]   5514 I discussed with Dr. Almazan, urology, discussed patient's clinical course and findings today with the right CVA and abdominal pain, leukocytosis of 19,000, and abnormal CT findings of the kidney.  At this point he does not feel acute intervention necessary, he would recommend antibiotics and outpatient follow-up if patient is feeling well enough to go home.  Curiously, there is no evidence of UTI on the urinalysis provided [DC]   1402 Patient updated on findings and discussion with urology, at this point after discussion with the patient, we have decided to hospitalize her for further monitoring and management  [DC]   1427 I spoke with MD Jermain with Urology at this time regarding the patient.  I discussed work-up, results, concerns.  I discussed the consulting provider's desire for med surg admit     [DC-2]      ED Course User Index  [DC] Berto Longoria MD  [DC-2] Jl Villanueva, PA     This patient is interesting as she has a significant leukocytosis of 19,000, right flank pain, nausea, vomiting but no evidence of urinary tract infection, abnormal findings on CT scan concerning for possible proximal obstruction and possibly pyelonephritis.  She is afebrile.  After discussion with urology and with the patient and family, I ultimately think that hospitalization to be the best course of action as she did arrive tachycardic, has been hypotensive in the 80s and 90s despite which she states is a chronic low blood pressure although I think this is probably lower than her norm.  I have started IV antibiotics, she has received 2 L of IV fluids.  Plan for continued monitoring and management and potentially further urology involvement.     Diagnosis Plan   1. Leukocytosis, unspecified type        2. Abnormal CT scan, kidney        3. Flank pain        4. Nausea and vomiting, unspecified vomiting type        5. Hypotension, unspecified hypotension type          HOSPITALIZATION    Discussed treatment plan and  reason for hospitalization with pt/family and hospitalizing physician.  Pt/family voiced understanding of the plan for hospitalization for further testing/treatment as needed.          Attestation:  The NITIN and I have discussed this patient's history, physical exam, and treatment plan.  I have reviewed the documentation and personally had a face to face interaction with the patient. I affirm the documentation and agree with the treatment and plan.  The attached note describes my personal findings.          Berto Longoria MD  10/31/22 1809      Electronically signed by Berto Longoria MD at 10/31/22 1809     Nicole Gutierrez RN at 10/31/22 1630          Nursing report ED to floor  Iliana Reyes  56 y.o.  female    HPI :   Chief Complaint   Patient presents with   • Abdominal Pain   • Vomiting       Admitting doctor:   Edmar Aly MD    Admitting diagnosis:   The primary encounter diagnosis was Leukocytosis, unspecified type. Diagnoses of Abnormal CT scan, kidney, Flank pain, Nausea and vomiting, unspecified vomiting type, and Hypotension, unspecified hypotension type were also pertinent to this visit.    Code status:   Current Code Status       Date Active Code Status Order ID Comments User Context       10/31/2022 1610 CPR (Attempt to Resuscitate) [id number]  Edmar Aly MD ED        Question Answer    Code Status (Patient has no pulse and is not breathing) CPR (Attempt to Resuscitate)    Medical Interventions (Patient has pulse or is breathing) Full Support                    Allergies:   Amitiza [lubiprostone], Codeine, and Pantoprazole    Isolation:   No active isolations    Intake and Output    Intake/Output Summary (Last 24 hours) at 10/31/2022 1630  Last data filed at 10/31/2022 1503  Gross per 24 hour   Intake 2100 ml   Output --   Net 2100 ml       Weight:       10/31/22  1059   Weight: 55.3 kg (122 lb)       Most recent vitals:   Vitals:    10/31/22 1445 10/31/22 1500 10/31/22 1501 10/31/22 1533   BP:    (!) 89/62 104/56   BP Location:   Left arm    Patient Position:   Lying    Pulse: 80 78 82 76   Resp:   16 16   Temp:       TempSrc:       SpO2: 100% 100% 100% 100%   Weight:       Height:           Active LDAs/IV Access:   Lines, Drains & Airways       Active LDAs       Name Placement date Placement time Site Days    Peripheral IV 10/31/22 1128 Right Antecubital 10/31/22  1128  Antecubital  less than 1                    Labs (abnormal labs have a star):   Labs Reviewed   COMPREHENSIVE METABOLIC PANEL - Abnormal; Notable for the following components:       Result Value    Glucose 126 (*)     Creatinine 0.51 (*)     All other components within normal limits    Narrative:     GFR Normal >60  Chronic Kidney Disease <60  Kidney Failure <15     CBC WITH AUTO DIFFERENTIAL - Abnormal; Notable for the following components:    WBC 19.26 (*)     RDW 11.7 (*)     MPV 12.6 (*)     Neutrophil % 87.0 (*)     Lymphocyte % 6.3 (*)     Eosinophil % 0.0 (*)     Immature Grans % 0.6 (*)     Neutrophils, Absolute 16.76 (*)     Monocytes, Absolute 1.13 (*)     Immature Grans, Absolute 0.12 (*)     All other components within normal limits   LIPASE - Normal   URINALYSIS W/ MICROSCOPIC IF INDICATED (NO CULTURE) - Normal    Narrative:     Urine microscopic not indicated.   LACTIC ACID, PLASMA - Normal   MAGNESIUM - Normal   BLOOD CULTURE   BLOOD CULTURE   URINE CULTURE   CBC AND DIFFERENTIAL    Narrative:     The following orders were created for panel order CBC & Differential.  Procedure                               Abnormality         Status                     ---------                               -----------         ------                     CBC Auto Differential[[id number]]        Abnormal            Final result                 Please view results for these tests on the individual orders.       EKG:   No orders to display       Meds given in ED:   Medications   sodium chloride 0.9 % infusion (100 mL/hr Intravenous New Bag  10/31/22 1534)   sodium chloride 0.9 % bolus 1,000 mL (0 mL Intravenous Stopped 10/31/22 1235)   ondansetron (ZOFRAN) injection 4 mg (4 mg Intravenous Given 10/31/22 1133)   morphine injection 4 mg (4 mg Intravenous Given 10/31/22 1134)   aluminum-magnesium hydroxide-simethicone (MAALOX MAX) 400-400-40 MG/5ML suspension 15 mL (15 mL Oral Given 10/31/22 1130)   Lidocaine Viscous HCl (XYLOCAINE) 2 % solution 15 mL (15 mL Mouth/Throat Given 10/31/22 1129)   iopamidol (ISOVUE-300) 61 % injection 100 mL (85 mL Intravenous Given 10/31/22 1253)   iopamidol (ISOVUE-300) 61 % injection 100 mL (85 mL Intravenous Given 10/31/22 1254)   cefTRIAXone (ROCEPHIN) 1 g in sodium chloride 0.9 % 100 mL IVPB-VTB (0 g Intravenous Stopped 10/31/22 1503)   lactated ringers bolus 1,000 mL (0 mL Intravenous Stopped 10/31/22 1502)   HYDROmorphone (DILAUDID) injection 1 mg (1 mg Intravenous Given 10/31/22 1404)       Imaging results:  No radiology results for the last day    Ambulatory status:   - UP AD DAVID    Social issues:   Social History     Socioeconomic History   • Marital status:    Tobacco Use   • Smoking status: Former     Packs/day: 1.00     Years: 20.00     Pack years: 20.00     Types: Cigarettes     Quit date: 2016     Years since quittin.8   • Smokeless tobacco: Never   Vaping Use   • Vaping Use: Never used   Substance and Sexual Activity   • Alcohol use: Yes     Alcohol/week: 2.0 standard drinks     Types: 2 Glasses of wine per week     Comment: occ   • Drug use: No   • Sexual activity: Yes     Partners: Male     Birth control/protection: None       NIH Stroke Scale:         Nicole Gutierrez RN  10/31/22 16:30 EDT        Electronically signed by Nicole Gutierrez RN at 10/31/22 1631     Nicole Gutierrez RN at 10/31/22 6351        Attempt to call report to [facility] states they do not take telemetry pt,  paged    Electronically signed by Nicole Gutierrez RN at 10/31/22 9905     Nicole Gutierrez, RN at 10/31/22 1652           Nursing report ED to floor  Iliana Reyes  56 y.o.  female    HPI :   Chief Complaint   Patient presents with   • Abdominal Pain   • Vomiting       Admitting doctor:   Edmar Aly MD    Admitting diagnosis:   The primary encounter diagnosis was Leukocytosis, unspecified type. Diagnoses of Abnormal CT scan, kidney, Flank pain, Nausea and vomiting, unspecified vomiting type, and Hypotension, unspecified hypotension type were also pertinent to this visit.    Code status:   Current Code Status       Date Active Code Status Order ID Comments User Context       10/31/2022 1610 CPR (Attempt to Resuscitate) [id number]  Edmar Aly MD ED        Question Answer    Code Status (Patient has no pulse and is not breathing) CPR (Attempt to Resuscitate)    Medical Interventions (Patient has pulse or is breathing) Full Support                    Allergies:   Amitiza [lubiprostone], Codeine, and Pantoprazole    Isolation:   No active isolations    Intake and Output    Intake/Output Summary (Last 24 hours) at 10/31/2022 1650  Last data filed at 10/31/2022 1503  Gross per 24 hour   Intake 2100 ml   Output --   Net 2100 ml       Weight:       10/31/22  1059   Weight: 55.3 kg (122 lb)       Most recent vitals:   Vitals:    10/31/22 1445 10/31/22 1500 10/31/22 1501 10/31/22 1533   BP:   (!) 89/62 104/56   BP Location:   Left arm    Patient Position:   Lying    Pulse: 80 78 82 76   Resp:   16 16   Temp:       TempSrc:       SpO2: 100% 100% 100% 100%   Weight:       Height:           Active LDAs/IV Access:   Lines, Drains & Airways       Active LDAs       Name Placement date Placement time Site Days    Peripheral IV 10/31/22 1128 Right Antecubital 10/31/22  1128  Antecubital  less than 1                    Labs (abnormal labs have a star):   Labs Reviewed   COMPREHENSIVE METABOLIC PANEL - Abnormal; Notable for the following components:       Result Value    Glucose 126 (*)     Creatinine 0.51 (*)     All other components within  normal limits    Narrative:     GFR Normal >60  Chronic Kidney Disease <60  Kidney Failure <15     CBC WITH AUTO DIFFERENTIAL - Abnormal; Notable for the following components:    WBC 19.26 (*)     RDW 11.7 (*)     MPV 12.6 (*)     Neutrophil % 87.0 (*)     Lymphocyte % 6.3 (*)     Eosinophil % 0.0 (*)     Immature Grans % 0.6 (*)     Neutrophils, Absolute 16.76 (*)     Monocytes, Absolute 1.13 (*)     Immature Grans, Absolute 0.12 (*)     All other components within normal limits   LIPASE - Normal   URINALYSIS W/ MICROSCOPIC IF INDICATED (NO CULTURE) - Normal    Narrative:     Urine microscopic not indicated.   LACTIC ACID, PLASMA - Normal   MAGNESIUM - Normal   BLOOD CULTURE   BLOOD CULTURE   URINE CULTURE   CBC AND DIFFERENTIAL    Narrative:     The following orders were created for panel order CBC & Differential.  Procedure                               Abnormality         Status                     ---------                               -----------         ------                     CBC Auto Differential[[id number]]        Abnormal            Final result                 Please view results for these tests on the individual orders.       EKG:   No orders to display       Meds given in ED:   Medications   sodium chloride 0.9 % infusion (100 mL/hr Intravenous New Bag 10/31/22 1534)   sodium chloride 0.9 % bolus 1,000 mL (0 mL Intravenous Stopped 10/31/22 1235)   ondansetron (ZOFRAN) injection 4 mg (4 mg Intravenous Given 10/31/22 1133)   morphine injection 4 mg (4 mg Intravenous Given 10/31/22 1134)   aluminum-magnesium hydroxide-simethicone (MAALOX MAX) 400-400-40 MG/5ML suspension 15 mL (15 mL Oral Given 10/31/22 1130)   Lidocaine Viscous HCl (XYLOCAINE) 2 % solution 15 mL (15 mL Mouth/Throat Given 10/31/22 1129)   iopamidol (ISOVUE-300) 61 % injection 100 mL (85 mL Intravenous Given 10/31/22 1253)   iopamidol (ISOVUE-300) 61 % injection 100 mL (85 mL Intravenous Given 10/31/22 1254)   cefTRIAXone (ROCEPHIN) 1 g  in sodium chloride 0.9 % 100 mL IVPB-VTB (0 g Intravenous Stopped 10/31/22 1503)   lactated ringers bolus 1,000 mL (0 mL Intravenous Stopped 10/31/22 1502)   HYDROmorphone (DILAUDID) injection 1 mg (1 mg Intravenous Given 10/31/22 1404)       Imaging results:  No radiology results for the last day    Ambulatory status:   - UP AD DAVID    Social issues:   Social History     Socioeconomic History   • Marital status:    Tobacco Use   • Smoking status: Former     Packs/day: 1.00     Years: 20.00     Pack years: 20.00     Types: Cigarettes     Quit date:      Years since quittin.8   • Smokeless tobacco: Never   Vaping Use   • Vaping Use: Never used   Substance and Sexual Activity   • Alcohol use: Yes     Alcohol/week: 2.0 standard drinks     Types: 2 Glasses of wine per week     Comment: occ   • Drug use: No   • Sexual activity: Yes     Partners: Male     Birth control/protection: None       NIH Stroke Scale:         Nicole Gutierrez RN  10/31/22 16:50 EDT          Electronically signed by Nicole Gutierrez, RN at 10/31/22 1650       Oxygen Therapy (since admission)     Date/Time SpO2 Device (Oxygen Therapy) Flow (L/min) Oxygen Concentration (%) ETCO2 (mmHg)    22 0744 97 room air -- -- --    22 0403 97 room air -- -- --    10/31/22 2300 97 room air -- -- --    10/31/22 2022 -- room air -- -- --    10/31/22 1949 99 room air -- -- --    10/31/22 1533 100 nasal cannula 2 -- --    10/31/22 1501 100 nasal cannula 2 -- --    10/31/22 1500 100 -- -- -- --    10/31/22 1445 100 -- -- -- --    10/31/22 1431 100 -- -- -- --    10/31/22 1430 100 -- -- -- --    10/31/22 1231 94 -- -- -- --    10/31/22 1230 95 -- -- -- --    10/31/22 1216 95 -- -- -- --    10/31/22 1215 96 -- -- -- --    10/31/22 1200 96 -- -- -- --    10/31/22 1146 96 -- -- -- --    10/31/22 1145 96 -- -- -- --    10/31/22 1059 98 room air -- -- --    10/31/22 0944 98 room air -- -- --        Intake & Output (last 2 days)       10/30 0701  10/31  0700 10/31 0701  11/01 0700 11/01 0701  11/02 0700    IV Piggyback  2100     Total Intake(mL/kg)  2100 (38)     Net  +2100            Urine Unmeasured Occurrence  1 x         Lines, Drains & Airways     Active LDAs     Name Placement date Placement time Site Days    Peripheral IV 10/31/22 1128 Right Antecubital 10/31/22  1128  Antecubital  1          Inactive LDAs     None                  Medication Administration Report for Iliana Reyes as of 11/01/22 1206   Legend:    Given Hold Not Given Due Canceled Entry Other Actions    Time Time (Time) Time  Time-Action       Discontinued     Completed     Future     MAR Hold     Linked           Medications 10/30/22 10/31/22 11/01/22    acetaminophen (TYLENOL) tablet 650 mg  Dose: 650 mg  Freq: Every 4 Hours PRN Route: PO  PRN Reason: Mild Pain  Start: 10/31/22 1942   Admin Instructions:   Based on patient request - if ordered for moderate or severe pain, provider allows for administration of a medication prescribed for a lower pain scale.  Do not exceed 4 grams of acetaminophen in a 24 hr period. Max dose of 2gm for AST/ALT greater than 120 units/L    If given for fever, use fever parameter: fever greater than 100.4 °F.    If given for pain, use the following pain scale:   Mild Pain = Pain Score of 1-3, CPOT 1-2  Moderate Pain = Pain Score of 4-6, CPOT 3-4  Severe Pain = Pain Score of 7-10, CPOT 5-8      0419-Given            Or  acetaminophen (TYLENOL) 160 MG/5ML solution 650 mg  Dose: 650 mg  Freq: Every 4 Hours PRN Route: PO  PRN Reason: Mild Pain  Start: 10/31/22 1942   Admin Instructions:   Based on patient request - if ordered for moderate or severe pain, provider allows for administration of a medication prescribed for a lower pain scale.  Do not exceed 4 grams of acetaminophen in a 24 hr period. Max dose of 2gm for AST/ALT greater than 120 units/L    If given for fever, use fever parameter: fever greater than 100.4 °F.    If given for pain, use the following  pain scale:   Mild Pain = Pain Score of 1-3, CPOT 1-2  Moderate Pain = Pain Score of 4-6, CPOT 3-4  Severe Pain = Pain Score of 7-10, CPOT 5-8      0419-Not Given:  See Alt            Or  acetaminophen (TYLENOL) suppository 650 mg  Dose: 650 mg  Freq: Every 4 Hours PRN Route: RE  PRN Reason: Mild Pain  Start: 10/31/22 1942   Admin Instructions:   Based on patient request - if ordered for moderate or severe pain, provider allows for administration of a medication prescribed for a lower pain scale.  Do not exceed 4 grams of acetaminophen in a 24 hr period. Max dose of 2gm for AST/ALT greater than 120 units/L    If given for fever, use fever parameter: fever greater than 100.4 °F.    If given for pain, use the following pain scale:   Mild Pain = Pain Score of 1-3, CPOT 1-2  Moderate Pain = Pain Score of 4-6, CPOT 3-4  Severe Pain = Pain Score of 7-10, CPOT 5-8      0419-Not Given:  See Alt             calcium 500 mg vitamin D 5 mcg (200 UT) per tablet 1 tablet  Dose: 1 tablet  Freq: 2 Times Daily Route: PO  Start: 10/31/22 2100     (2306)-Not Given          1016-Given     2100            cefTRIAXone (ROCEPHIN) 1 g in sodium chloride 0.9 % 100 mL IVPB-VTB  Dose: 1 g  Freq: Every 24 Hours Route: IV  Indications of Use: INTRA-ABDOMINAL INFECTION,URINARY TRACT INFECTION  Start: 11/01/22 1400   End: 11/08/22 1359   Admin Instructions:   LR should be paused and flushing of the line with NS is recommended prior to and after completion of ceftriaxone infusion due to incompatibility. Do not co-adminster with calcium-containing solutions.  Caution: Look alike/sound alike drug alert      1400             cholecalciferol (VITAMIN D3) tablet 1,000 Units  Dose: 1,000 Units  Freq: Daily Route: PO  Start: 10/31/22 2030     (2307)-Not Given          1016-Given             cycloSPORINE (RESTASIS) 0.05 % ophthalmic emulsion 1 drop  Dose: 1 drop  Freq: 2 Times Daily Route: Both Eyes  Start: 10/31/22 2100   Admin Instructions:   Caution:  Look alike/sound alike drug alert. Prior to use invert container several times to obtain uniform emulsion     2255-Given          1016-Given     2100            estradiol (VIVELLE-DOT) 0.025 MG/24HR patch 1 patch  Dose: 1 patch  Freq: Once per day on Tue Thu Route: TD  Start: 11/01/22 0900   Order specific questions:   Drug Name: vivelle-DOT 0.025 mg/24 hr patch        0900             linaclotide (LINZESS) capsule 145 mcg  Dose: 145 mcg  Freq: Every Morning Before Breakfast Route: PO  Start: 11/01/22 0730   Order specific questions:   Drug Name: linzess        0730             morphine injection 4 mg  Dose: 4 mg  Freq: Every 4 Hours PRN Route: IV  PRN Reason: Severe Pain  Start: 10/31/22 1942   Admin Instructions:   Based on patient request - if ordered for moderate or severe pain, provider allows for administration of a medication prescribed for a lower pain scale.  If given for pain, use the following pain scale:  Mild Pain = Pain Score of 1-3, CPOT 1-2  Moderate Pain = Pain Score of 4-6, CPOT 3-4  Severe Pain = Pain Score of 7-10, CPOT 5-8      0900-Given             nitroglycerin (NITROSTAT) SL tablet 0.4 mg  Dose: 0.4 mg  Freq: Every 5 Minutes PRN Route: SL  PRN Reason: Chest Pain  PRN Comment: Only if SBP Greater Than 100  Start: 10/31/22 1942   Admin Instructions:   If Pain Unrelieved After 3 Doses Notify MD          ondansetron (ZOFRAN) injection 4 mg  Dose: 4 mg  Freq: Every 4 Hours PRN Route: IV  PRN Reasons: Nausea,Vomiting  Start: 11/01/22 0019      0025-Given             polyethylene glycol (MIRALAX) packet 17 g  Dose: 17 g  Freq: Daily Route: PO  Start: 11/01/22 1015   Admin Instructions:   Use 4-8 ounces of water, tea, or juice for each 17 gram dose.      1016-Given             sodium chloride 0.9 % flush 10 mL  Dose: 10 mL  Freq: As Needed Route: IV  PRN Reason: Line Care  Start: 10/31/22 1942          sodium chloride 0.9 % flush 10 mL  Dose: 10 mL  Freq: Every 12 Hours Scheduled Route: IV  Start:  10/31/22 2100     2255-Given          1032-Canceled Entry     2100            sodium chloride 0.9 % infusion  Rate: 150 mL/hr Dose: 150 mL/hr  Freq: Continuous Route: IV  Start: 10/31/22 2030     1958-New Bag          0026-New Bag     0757-New Bag           Completed Medications  Medications 10/30/22 10/31/22 11/01/22       aluminum-magnesium hydroxide-simethicone (MAALOX MAX) 400-400-40 MG/5ML suspension 15 mL  Dose: 15 mL  Freq: Once Route: PO  Start: 10/31/22 1043   End: 10/31/22 1130   Admin Instructions:   Mix with viscous lidocaine in this panel     1130-Given              cefTRIAXone (ROCEPHIN) 1 g in sodium chloride 0.9 % 100 mL IVPB-VTB  Dose: 1 g  Freq: Once Route: IV  Indications of Use: INTRA-ABDOMINAL INFECTION  Start: 10/31/22 1356   End: 10/31/22 1503   Admin Instructions:   LR should be paused and flushing of the line with NS is recommended prior to and after completion of ceftriaxone infusion due to incompatibility. Do not co-adminster with calcium-containing solutions.  Caution: Look alike/sound alike drug alert     1417-New Bag     1503-Stopped             HYDROmorphone (DILAUDID) injection 1 mg  Dose: 1 mg  Freq: Once Route: IV  Start: 10/31/22 1357   End: 10/31/22 1404   Admin Instructions:   Based on patient request - if ordered for moderate or severe pain, provider allows for administration of a medication prescribed for a lower pain scale.  Caution: Look alike/sound alike drug alert    If given for pain, use the following pain scale:  Mild Pain = Pain Score of 1-3, CPOT 1-2  Moderate Pain = Pain Score of 4-6, CPOT 3-4  Severe Pain = Pain Score of 7-10, CPOT 5-8     1404-Given              iopamidol (ISOVUE-300) 61 % injection 100 mL  Dose: 100 mL  Freq: Once in Imaging Route: IV  Start: 10/31/22 1255   End: 10/31/22 1254     1254-Given              iopamidol (ISOVUE-300) 61 % injection 100 mL  Dose: 100 mL  Freq: Once in Imaging Route: IV  Start: 10/31/22 1251   End: 10/31/22 1251      1253-Given              lactated ringers bolus 1,000 mL  Dose: 1,000 mL  Freq: Once Route: IV  Start: 10/31/22 1357   End: 10/31/22 1502     1415-New Bag     1502-Stopped             Lidocaine Viscous HCl (XYLOCAINE) 2 % solution 15 mL  Dose: 15 mL  Freq: Once Route: MT  Start: 10/31/22 1043   End: 10/31/22 1129   Admin Instructions:   Mix with alum / mag hydroxide / simeth (mylanta) in this panel  If given for pain, use the following pain scale:  Mild Pain = Pain Score of 1-3, CPOT 1-2  Moderate Pain = Pain Score of 4-6, CPOT 3-4  Severe Pain = Pain Score of 7-10, CPOT 5-8     1129-Given              morphine injection 4 mg  Dose: 4 mg  Freq: Once Route: IV  Start: 10/31/22 1043   End: 10/31/22 1134   Admin Instructions:   Based on patient request - if ordered for moderate or severe pain, provider allows for administration of a medication prescribed for a lower pain scale.  If given for pain, use the following pain scale:  Mild Pain = Pain Score of 1-3, CPOT 1-2  Moderate Pain = Pain Score of 4-6, CPOT 3-4  Severe Pain = Pain Score of 7-10, CPOT 5-8     1134-Given              ondansetron (ZOFRAN) injection 4 mg  Dose: 4 mg  Freq: Once Route: IV  Start: 10/31/22 1014   End: 10/31/22 1133     1133-Given              sodium chloride 0.9 % bolus 1,000 mL  Dose: 1,000 mL  Freq: Once Route: IV  Start: 10/31/22 1014   End: 10/31/22 1235     1129-New Bag     1235-Stopped            Discontinued Medications  Medications 10/30/22 10/31/22 11/01/22       ondansetron (ZOFRAN) injection 4 mg  Dose: 4 mg  Freq: Every 6 Hours PRN Route: IV  PRN Reasons: Nausea,Vomiting  Start: 10/31/22 1942   End: 11/01/22 0020 2017-Given              sodium chloride 0.9 % infusion  Rate: 100 mL/hr Dose: 100 mL/hr  Freq: Continuous Route: IV  Start: 10/31/22 1523   End: 10/31/22 1942     1534-New Bag              trimethoprim-polymyxin b (POLYTRIM) 34947-1.1 UNIT/ML-% ophthalmic solution 1 drop  Dose: 1 drop  Freq: Every 4 Hours Route: Both  Eyes  Start: 10/31/22 2030   End: 22 0929     (2307)-Not Given     (2338)-Not Given         (0351)-Not Given     (0945)-Not Given                          Physician Progress Notes (all)      Carl Servin MD at 22 1014              [facility]  PROGRESS NOTE    Patient Name: Iliana Reyes  : 1965  MRN: [id number]    Date of Admission: 10/31/2022  Primary Care Physician: Minda Sanchez APRN    Subjective   Subjective     CC:  Follow-up right flank pain    HPI:  Nausea better today.  Right-sided pain slightly improved but still persists.  Patient is pleased with her progress.  She denies any new complaints.  She has her daughter at the bedside.  We discussed her treatment plan and all the results so far.    Review of Systems  No current fevers or chills  No current shortness of breath or cough  No current chest pain or palpitations      Objective   Objective     Vital Signs:   Temp:  [97.5 °F (36.4 °C)-98.2 °F (36.8 °C)] 98.2 °F (36.8 °C)  Heart Rate:  [75-84] 75  Resp:  [14-16] 14  BP: ()/(51-79) 101/56        Physical Exam:  Constitutional:Awake, alert  HENT: NCAT, mucous membranes moist, neck supple  Respiratory: No cough or wheezes, respiratory effort normal, nonlabored breathing   Cardiovascular: normal radial pulses, regular rate  Gastrointestinal: Right-sided abdominal tenderness without guarding, soft, no hepatomegaly, nondistended  Musculoskeletal: Thin, BMI is 20, otherwise normal musculature for age, no lower extremity edema  Psychiatric: Appropriate affect, cooperative, conversational  Neurologic: No slurred speech or facial droop, follows commands  Skin: No rashes or jaundice, warm      Results Reviewed:  Results from last 7 days   Lab Units 22  0850 10/31/22  1114   WBC 10*3/mm3 13.88* 19.26*   HEMOGLOBIN g/dL 11.4* 14.0   HEMATOCRIT % 33.5* 41.8   PLATELETS 10*3/mm3 182 236     Results from last 7 days   Lab Units 10/31/22  1114    SODIUM mmol/L 138   POTASSIUM mmol/L 3.7   CHLORIDE mmol/L 98   CO2 mmol/L 29.0   BUN mg/dL 6   CREATININE mg/dL 0.51*   GLUCOSE mg/dL 126*   CALCIUM mg/dL 9.3   ALT (SGPT) U/L 7   AST (SGOT) U/L 18     Estimated Creatinine Clearance: 108.8 mL/min (A) (by C-G formula based on SCr of 0.51 mg/dL (L)).    Microbiology Results Abnormal     Procedure Component Value - Date/Time    Urine Culture - Urine, Urine, Clean Catch [[id number]]  (Normal) Collected: 10/31/22 1117    Lab Status: Preliminary result Specimen: Urine, Clean Catch Updated: 11/01/22 0533     Urine Culture No growth          Imaging Results (Last 24 Hours)     Procedure Component Value Units Date/Time    CT Abdomen Pelvis With Contrast [[id number]] Collected: 10/31/22 1301     Updated: 10/31/22 1316    Narrative:      CT ABDOMEN PELVIS W CONTRAST-     Radiation dose reduction techniques were utilized, including automated  exposure control and exposure modulation based on body size.           Clinical: Abdominal pain     FINDINGS: Both kidneys demonstrate asymmetric satisfactory pattern of  enhancement and there are tiny renal cortical cysts bilaterally. There  is perinephric fat stranding along the lower pole of the right kidney  and proximal ureter. There could be mild uroepithelial thickening of the  proximal ureter. Very subtle pyelocaliectasis without ureterectasis and  no UPJ abnormality. Differential to include pyelonephritis or UPJ  obstruction without obvious stone or tumor seen. The left renal  collecting system is within normal limits. The mid and distal right  ureter have a satisfactory appearance, no stone seen.     CBD diameter is approximately 6 mm status post cholecystectomy similar  to MRCP from 2019. Intrahepatic ducts are slightly prominent as before.  No CBD filling defect identified.     The liver parenchyma is typical in appearance. The spleen and pancreas  are normal. Both adrenal glands have typical appearance. Diameter of the  aorta  is within normal limits. The urinary bladder is normal. No bladder  stone or wall thickening seen. Vaginal cuff is typical post  hysterectomy. Minimal diverticulosis of the colon, no diverticulitis.  The appendix is normal. The small bowel is satisfactory in appearance.  The stomach is collapsed, contour within normal limits, no duodenal  abnormality.     CONCLUSION:  1. Minimal right-sided perinephric fat stranding with nominal  pyelocaliectasis, no UPJ abnormality seen however there is subtle  induration and uroepithelial thickening of the proximal ureter,  pyelonephritis versus UPJ obstruction without obvious stone or tumor  seen on this examination.  2. Previous cholecystectomy. No change in the appearance of the biliary  tree, no indication of CBD filling defect.  3. Minimal diverticulosis of the colon. The bowel is otherwise within  normal limits.        This report was finalized on 10/31/2022 1:13 PM by Dr. Flash Smith M.D.             Results for orders placed in visit on 12/01/21    Adult Transthoracic Echo Complete W/ Cont if Necessary Per Protocol    Interpretation Summary  · Calculated left ventricular EF = 54.9% Estimated left ventricular EF was in agreement with the calculated left ventricular EF. Left ventricular systolic function is normal.  · Left ventricular diastolic function was normal.  · There is mild mitral valve prolapse of the anterior mitral leaflet.  · Calculated right ventricular systolic pressure from tricuspid regurgitation is 19.4 mmHg.  · There is a trivial pericardial effusion.      I have reviewed the medications:  Scheduled Meds:calcium 500 mg vitamin D 5 mcg (200 UT), 1 tablet, Oral, BID  cefTRIAXone, 1 g, Intravenous, Q24H  cholecalciferol, 1,000 Units, Oral, Daily  cycloSPORINE, 1 drop, Both Eyes, BID  estradiol, 1 patch, Transdermal, Once per day on Tue Thu  linaclotide, 145 mcg, Oral, QAM AC  polyethylene glycol, 17 g, Oral, Daily  sodium chloride, 10 mL, Intravenous,  Q12H      Continuous Infusions:sodium chloride, 150 mL/hr, Last Rate: 150 mL/hr (11/01/22 0757)      PRN Meds:.•  acetaminophen **OR** acetaminophen **OR** acetaminophen  •  Morphine  •  nitroglycerin  •  ondansetron  •  sodium chloride    Assessment & Plan   Assessment & Plan     Active Hospital Problems    Diagnosis  POA   • **Leukocytosis, unspecified type [D72.829]  Yes   • History of migraine [Z86.69]  Not Applicable   • Irritable bowel syndrome with constipation [K58.1]  Yes   • Dry eyes [H04.123]  Yes   • Pyelonephritis [N12]  Yes   • Hypotension [I95.9]  Yes      Resolved Hospital Problems   No resolved problems to display.        Brief Hospital Course to date:  Iliana Reyes is a 56 y.o. female presents the hospital with sepsis and right-sided abdominal/flank pain and was found on CT scan to have perinephric inflammation concerning for acute pyelonephritis.  Urinalysis did not show significant pyuria felt to be consistent with more of an atypical presentation.    Discussion/plan:  Clinically patient seems to be improved.  Sepsis resolving.  Decreased IV fluid.  Continue ceftriaxone.  Follow-up on urology recommendations.  It is unusual to have pyelonephritis without pyuria but seems to clinically be consistent with pyelonephritis type presentation and findings on examination.  Patient appears to be responding to treatment.  Encourage oral intake.  Can bolus as needed if further hypotension were to occur.  Greater than 35 minutes spent in chart review, coordination of care, and counseling.  20 minutes spent at the bedside counseling patient regarding treatment plan and reviewing findings    Pyelonephritis with secondary sepsis:  Ceftriaxone.  Urology.  Improving.    Hypotension:  Due to sepsis.  Treated with IV fluid.  Resolved.  Monitor.    History of migraines:  Chronically treated with propranolol.  Propranolol held due to low blood pressure.  Monitor off this medication for now while infected.  Primary  care follow-up and recommended to follow-up with neurologist    IBS with constipation: Continue Linzess.  MiraLAX.    History of dry eyes:  Continue chronic eyedrops.    DVT Prophylaxis: Mechanical and ambulatory      Disposition: Home when better    CODE STATUS:   Code Status and Medical Interventions:   Ordered at: 10/31/22 1610     Code Status (Patient has no pulse and is not breathing):    CPR (Attempt to Resuscitate)     Medical Interventions (Patient has pulse or is breathing):    Full Support       Carl Servin MD  11/01/22      Electronically signed by Carl Servin MD at 11/01/22 1020       Consult Notes (all)    No notes of this type exist for this encounter.

## 2022-11-02 ENCOUNTER — READMISSION MANAGEMENT (OUTPATIENT)
Dept: CALL CENTER | Facility: HOSPITAL | Age: 57
End: 2022-11-02

## 2022-11-02 ENCOUNTER — TELEPHONE (OUTPATIENT)
Dept: INTERNAL MEDICINE | Facility: CLINIC | Age: 57
End: 2022-11-02

## 2022-11-02 VITALS
OXYGEN SATURATION: 97 % | HEIGHT: 65 IN | SYSTOLIC BLOOD PRESSURE: 109 MMHG | DIASTOLIC BLOOD PRESSURE: 68 MMHG | RESPIRATION RATE: 18 BRPM | WEIGHT: 122 LBS | BODY MASS INDEX: 20.33 KG/M2 | TEMPERATURE: 97.9 F | HEART RATE: 76 BPM

## 2022-11-02 PROBLEM — R11.2 NAUSEA AND VOMITING: Status: ACTIVE | Noted: 2022-11-02

## 2022-11-02 PROBLEM — I95.9 HYPOTENSION: Status: RESOLVED | Noted: 2022-10-31 | Resolved: 2022-11-02

## 2022-11-02 PROBLEM — R93.429 ABNORMAL CT SCAN, KIDNEY: Status: ACTIVE | Noted: 2022-11-02

## 2022-11-02 PROBLEM — N28.89: Status: ACTIVE | Noted: 2022-11-02

## 2022-11-02 PROBLEM — R11.2 NAUSEA AND VOMITING: Status: RESOLVED | Noted: 2022-11-02 | Resolved: 2022-11-02

## 2022-11-02 LAB
ANION GAP SERPL CALCULATED.3IONS-SCNC: 8 MMOL/L (ref 5–15)
BUN SERPL-MCNC: 3 MG/DL (ref 6–20)
BUN/CREAT SERPL: 8.8 (ref 7–25)
CALCIUM SPEC-SCNC: 8 MG/DL (ref 8.6–10.5)
CHLORIDE SERPL-SCNC: 105 MMOL/L (ref 98–107)
CO2 SERPL-SCNC: 25 MMOL/L (ref 22–29)
CREAT SERPL-MCNC: 0.34 MG/DL (ref 0.57–1)
DEPRECATED RDW RBC AUTO: 38.1 FL (ref 37–54)
EGFRCR SERPLBLD CKD-EPI 2021: 121.7 ML/MIN/1.73
ERYTHROCYTE [DISTWIDTH] IN BLOOD BY AUTOMATED COUNT: 11.4 % (ref 12.3–15.4)
GLUCOSE SERPL-MCNC: 100 MG/DL (ref 65–99)
HCT VFR BLD AUTO: 31.6 % (ref 34–46.6)
HGB BLD-MCNC: 10.6 G/DL (ref 12–15.9)
MAGNESIUM SERPL-MCNC: 2.3 MG/DL (ref 1.6–2.6)
MCH RBC QN AUTO: 30.4 PG (ref 26.6–33)
MCHC RBC AUTO-ENTMCNC: 33.5 G/DL (ref 31.5–35.7)
MCV RBC AUTO: 90.5 FL (ref 79–97)
PLATELET # BLD AUTO: 162 10*3/MM3 (ref 140–450)
PMV BLD AUTO: 12.7 FL (ref 6–12)
POTASSIUM SERPL-SCNC: 4.2 MMOL/L (ref 3.5–5.2)
RBC # BLD AUTO: 3.49 10*6/MM3 (ref 3.77–5.28)
SODIUM SERPL-SCNC: 138 MMOL/L (ref 136–145)
WBC NRBC COR # BLD: 12.8 10*3/MM3 (ref 3.4–10.8)

## 2022-11-02 PROCEDURE — 96366 THER/PROPH/DIAG IV INF ADDON: CPT

## 2022-11-02 PROCEDURE — 25010000002 ONDANSETRON PER 1 MG: Performed by: NURSE PRACTITIONER

## 2022-11-02 PROCEDURE — 80048 BASIC METABOLIC PNL TOTAL CA: CPT | Performed by: INTERNAL MEDICINE

## 2022-11-02 PROCEDURE — 96376 TX/PRO/DX INJ SAME DRUG ADON: CPT

## 2022-11-02 PROCEDURE — 83735 ASSAY OF MAGNESIUM: CPT | Performed by: INTERNAL MEDICINE

## 2022-11-02 PROCEDURE — 25010000002 MORPHINE PER 10 MG: Performed by: INTERNAL MEDICINE

## 2022-11-02 PROCEDURE — G0378 HOSPITAL OBSERVATION PER HR: HCPCS

## 2022-11-02 PROCEDURE — 85027 COMPLETE CBC AUTOMATED: CPT | Performed by: INTERNAL MEDICINE

## 2022-11-02 PROCEDURE — 25010000002 CEFTRIAXONE PER 250 MG: Performed by: INTERNAL MEDICINE

## 2022-11-02 RX ORDER — MAGNESIUM SULFATE HEPTAHYDRATE 40 MG/ML
4 INJECTION, SOLUTION INTRAVENOUS AS NEEDED
Status: DISCONTINUED | OUTPATIENT
Start: 2022-11-02 | End: 2022-11-02 | Stop reason: HOSPADM

## 2022-11-02 RX ORDER — MAGNESIUM SULFATE HEPTAHYDRATE 40 MG/ML
2 INJECTION, SOLUTION INTRAVENOUS AS NEEDED
Status: DISCONTINUED | OUTPATIENT
Start: 2022-11-02 | End: 2022-11-02 | Stop reason: HOSPADM

## 2022-11-02 RX ORDER — CEFDINIR 300 MG/1
300 CAPSULE ORAL 2 TIMES DAILY
Qty: 25 CAPSULE | Refills: 0 | Status: SHIPPED | OUTPATIENT
Start: 2022-11-02 | End: 2022-11-15

## 2022-11-02 RX ORDER — ONDANSETRON 4 MG/1
4 TABLET, FILM COATED ORAL EVERY 8 HOURS PRN
Qty: 15 TABLET | Refills: 0 | Status: SHIPPED | OUTPATIENT
Start: 2022-11-02 | End: 2023-01-13

## 2022-11-02 RX ORDER — ACETAMINOPHEN 325 MG/1
650 TABLET ORAL EVERY 6 HOURS PRN
Start: 2022-11-02

## 2022-11-02 RX ORDER — TRAMADOL HYDROCHLORIDE 50 MG/1
50 TABLET ORAL EVERY 8 HOURS PRN
Qty: 10 TABLET | Refills: 0 | Status: SHIPPED | OUTPATIENT
Start: 2022-11-02 | End: 2023-01-13

## 2022-11-02 RX ADMIN — POTASSIUM CHLORIDE 40 MEQ: 750 TABLET, EXTENDED RELEASE ORAL at 00:08

## 2022-11-02 RX ADMIN — CEFTRIAXONE SODIUM 1 G: 1 INJECTION, POWDER, FOR SOLUTION INTRAMUSCULAR; INTRAVENOUS at 09:59

## 2022-11-02 RX ADMIN — Medication 10 ML: at 10:03

## 2022-11-02 RX ADMIN — MORPHINE SULFATE 4 MG: 2 INJECTION, SOLUTION INTRAMUSCULAR; INTRAVENOUS at 00:09

## 2022-11-02 RX ADMIN — CYCLOSPORINE 1 DROP: 0.5 EMULSION OPHTHALMIC at 09:59

## 2022-11-02 RX ADMIN — Medication 1000 UNITS: at 09:59

## 2022-11-02 RX ADMIN — ACETAMINOPHEN 650 MG: 325 TABLET, FILM COATED ORAL at 06:40

## 2022-11-02 RX ADMIN — ONDANSETRON 4 MG: 2 INJECTION INTRAMUSCULAR; INTRAVENOUS at 00:09

## 2022-11-02 RX ADMIN — POLYETHYLENE GLYCOL 3350 17 G: 17 POWDER, FOR SOLUTION ORAL at 10:00

## 2022-11-02 RX ADMIN — ONDANSETRON 4 MG: 2 INJECTION INTRAMUSCULAR; INTRAVENOUS at 06:40

## 2022-11-02 RX ADMIN — CALCIUM CARBONATE-VITAMIN D TAB 500 MG-200 UNIT 1 TABLET: 500-200 TAB at 10:00

## 2022-11-02 NOTE — PLAN OF CARE
Goal Outcome Evaluation:  Plan of Care Reviewed With: patient        Progress: improving  Outcome Evaluation: Patient pain is much better. Patient has been able to rest well. Potassium replaced with tablets. Zofran and morphin given prn. Pt has bee able to drink sprit with no vomitting this shift. Pt states that the pain has gotten much better. IVF at 150ml/hr. Waiting on labs, probably home today. CTM, safety maintained.

## 2022-11-02 NOTE — OUTREACH NOTE
Prep Survey    Flowsheet Row Responses   [facility] patient discharged from? [place]   Is LACE score < 7 ? Yes   Emergency Room discharge w/ pulse ox? No   Eligibility [facility]   Date of Admission 10/31/22   Date of Discharge 11/02/22   Discharge Disposition Home or Self Care   Discharge diagnosis Nausea and vomiting,   Leukocytosis,    Pyelonephritis   Does the patient have one of the following disease processes/diagnoses(primary or secondary)? Other   Does the patient have Home health ordered? No   Is there a DME ordered? No   Prep survey completed? Yes          GEETHA DE LA ROSA - Registered Nurse

## 2022-11-02 NOTE — TELEPHONE ENCOUNTER
Caller: Iliana Reyes    Relationship: Self    Best call back number: [phone]    What is the best time to reach you: ANY TIME     Who are you requesting to speak with (clinical staff, provider,  specific staff member): CLINICAL STAFF    What was the call regarding: PATIENT STATES THAT SHE WAS ADMITTED TO [facility] ON Sunday, 10/30/22 FOR A KIDNEY INFECTION AND ELEVATED WHITE BLOOD CELL COUNT.  SHE SAYS THAT SHE IS HAVING BAD MIGRAINE HEADACHES AND THAT THE HOSPITAL DOCTOR WANTED HER TO LET AKBAR AQUINO KNOW THAT PATIENT SHOULD STOP TAKING HER PRESCRIBED MIGRAINE MEDICATION IMMEDIATELY.    PLEASE CALL HER TO DISCUSS AND ADVISE.    Do you require a callback: YES

## 2022-11-02 NOTE — DISCHARGE SUMMARY
[facility] Medicine Services  DISCHARGE SUMMARY    Patient Name: Iliana Reyes  : 1965  MRN: [id number]    Date of Admission: 10/31/2022  9:53 AM  Date of Discharge: 2022  Primary Care Physician: Minda Sanchez APRN    Consults     Date and Time Order Name Status Description    10/31/2022  1:56 PM [facility] (on-call MD unless specified) Details      10/31/2022  1:28 PM Urology (on-call MD unless specified)            Hospital Course       Active Hospital Problems    Diagnosis  POA   • **Leukocytosis, unspecified type [D72.829]  Yes   • Inflammation of ureter seen on CT scan [N28.89]  Yes   • Abnormal CT scan, kidney [R93.429]  Unknown   • History of migraine [Z86.69]  Not Applicable   • Irritable bowel syndrome with constipation [K58.1]  Yes   • Dry eyes [H04.123]  Yes   • Pyelonephritis [N12]  Yes      Resolved Hospital Problems    Diagnosis Date Resolved POA   • Nausea and vomiting [R11.2] 2022 Yes   • Hypotension [I95.9] 2022 Yes          Hospital Course:  Iliana Reyes is a 56 y.o. female presented to the hospital with flank pain, leukocytosis, and abnormal CT scan of the kidney and ureter showing inflammation concerning for pyelonephritis versus UPJ obstruction.  Please see CT scan report below for full details.  Patient was evaluated by urology who recommended a 2-week course of antibiotic.  Leukocytosis has improved and patient has clinically improved.  She is still having some intermittent nausea and pain but this is drastically better.  Patient appears hemodynamically stable to discharge today and is very eager to do so.  She agrees to follow-up with urology and primary care provider.  Urology is planning to follow-up in clinic in approximately 2 weeks with repeat imaging ultrasound to further evaluate kidney findings.  At the time of discharge patient was told to take all medications as prescribed, keep all follow-up appointments, and call their doctor or return to the  hospital with any worsening or concerning symptoms.    Please note that this note was made using Dragon voice recognition software            Day of Discharge     HPI:   Feels much better.  Only mild nausea and tolerating diet.  Some pain but drastically improved.  Patient is very eager to discharge today.  Her daughter who is a nurse at that the bedside and is also eager for her mother to be discharged.  No new complaints reported    ROS:  No current fevers or chills  No current shortness of breath or cough  No current nausea, vomiting, or diarrhea  No current chest pain or palpitations      Vital Signs:   Temp:  [97.9 °F (36.6 °C)-98.6 °F (37 °C)] 97.9 °F (36.6 °C)  Heart Rate:  [74-93] 76  Resp:  [16-18] 18  BP: (104-125)/(62-69) 109/68     Physical Exam:  Constitutional:Awake, alert  HENT: NCAT, mucous membranes moist, neck supple  Respiratory: Clear to auscultation bilaterally, respiratory effort normal, nonlabored breathing   Cardiovascular: RRR, normal radial pulses  Gastrointestinal: Positive bowel sounds, soft, mild right tenderness, nondistended  Musculoskeletal: Normal musculature for age, no lower extremity edema, BMI 20, thin  Psychiatric: Appropriate affect, cooperative, conversational  Neurologic: No slurred speech or facial droop, follows commands  Skin: No rashes or jaundice, warm      Pertinent  and/or Most Recent Results     Results from last 7 days   Lab Units 11/02/22  0426 11/01/22  0850 10/31/22  1114   WBC 10*3/mm3 12.80* 13.88* 19.26*   HEMOGLOBIN g/dL 10.6* 11.4* 14.0   HEMATOCRIT % 31.6* 33.5* 41.8   PLATELETS 10*3/mm3 162 182 236   SODIUM mmol/L 138 140 138   POTASSIUM mmol/L 4.2 3.2* 3.7   CHLORIDE mmol/L 105 106 98   CO2 mmol/L 25.0 23.0 29.0   BUN mg/dL 3* 5* 6   CREATININE mg/dL 0.34* 0.36* 0.51*   GLUCOSE mg/dL 100* 71 126*   CALCIUM mg/dL 8.0* 8.5* 9.3     Results from last 7 days   Lab Units 11/01/22  0850 10/31/22  1114   BILIRUBIN mg/dL 0.8 1.1   ALK PHOS U/L 76 74   ALT (SGPT)  U/L 12 7   AST (SGOT) U/L 19 18           Invalid input(s): TG, LDLCALC, LDLREALC  Results from last 7 days   Lab Units 11/01/22  0850 10/31/22  1114   LACTATE mmol/L 1.3 1.9       Brief Urine Lab Results  (Last result in the past 365 days)      Color   Clarity   Blood   Leuk Est   Nitrite   Protein   CREAT   Urine HCG        10/31/22 1117 Yellow   Clear   Negative   Negative   Negative   Negative                 Microbiology Results Abnormal     Procedure Component Value - Date/Time    Urine Culture - Urine, Urine, Clean Catch [[id number]]  (Normal) Collected: 10/31/22 1117    Lab Status: Final result Specimen: Urine, Clean Catch Updated: 11/01/22 1842     Urine Culture No growth    Blood Culture - Blood, Arm, Right [[id number]]  (Normal) Collected: 10/31/22 1415    Lab Status: Preliminary result Specimen: Blood from Arm, Right Updated: 11/01/22 1446     Blood Culture No growth at 24 hours    Blood Culture - Blood, Arm, Left [[id number]]  (Normal) Collected: 10/31/22 1416    Lab Status: Preliminary result Specimen: Blood from Arm, Left Updated: 11/01/22 1431     Blood Culture No growth at 24 hours          Imaging Results (All)     Procedure Component Value Units Date/Time    CT Abdomen Pelvis With Contrast [[id number]] Collected: 10/31/22 1301     Updated: 10/31/22 1316    Narrative:      CT ABDOMEN PELVIS W CONTRAST-     Radiation dose reduction techniques were utilized, including automated  exposure control and exposure modulation based on body size.           Clinical: Abdominal pain     FINDINGS: Both kidneys demonstrate asymmetric satisfactory pattern of  enhancement and there are tiny renal cortical cysts bilaterally. There  is perinephric fat stranding along the lower pole of the right kidney  and proximal ureter. There could be mild uroepithelial thickening of the  proximal ureter. Very subtle pyelocaliectasis without ureterectasis and  no UPJ abnormality. Differential to include pyelonephritis or  UPJ  obstruction without obvious stone or tumor seen. The left renal  collecting system is within normal limits. The mid and distal right  ureter have a satisfactory appearance, no stone seen.     CBD diameter is approximately 6 mm status post cholecystectomy similar  to MRCP from 2019. Intrahepatic ducts are slightly prominent as before.  No CBD filling defect identified.     The liver parenchyma is typical in appearance. The spleen and pancreas  are normal. Both adrenal glands have typical appearance. Diameter of the  aorta is within normal limits. The urinary bladder is normal. No bladder  stone or wall thickening seen. Vaginal cuff is typical post  hysterectomy. Minimal diverticulosis of the colon, no diverticulitis.  The appendix is normal. The small bowel is satisfactory in appearance.  The stomach is collapsed, contour within normal limits, no duodenal  abnormality.     CONCLUSION:  1. Minimal right-sided perinephric fat stranding with nominal  pyelocaliectasis, no UPJ abnormality seen however there is subtle  induration and uroepithelial thickening of the proximal ureter,  pyelonephritis versus UPJ obstruction without obvious stone or tumor  seen on this examination.  2. Previous cholecystectomy. No change in the appearance of the biliary  tree, no indication of CBD filling defect.  3. Minimal diverticulosis of the colon. The bowel is otherwise within  normal limits.        This report was finalized on 10/31/2022 1:13 PM by Dr. Flash Smith M.D.                     Results for orders placed in visit on 12/01/21    Adult Transthoracic Echo Complete W/ Cont if Necessary Per Protocol    Interpretation Summary  · Calculated left ventricular EF = 54.9% Estimated left ventricular EF was in agreement with the calculated left ventricular EF. Left ventricular systolic function is normal.  · Left ventricular diastolic function was normal.  · There is mild mitral valve prolapse of the anterior mitral leaflet.  ·  Calculated right ventricular systolic pressure from tricuspid regurgitation is 19.4 mmHg.  · There is a trivial pericardial effusion.      Discharge Details        Discharge Medications      New Medications      Instructions Start Date   acetaminophen 325 MG tablet  Commonly known as: TYLENOL   650 mg, Oral, Every 6 Hours PRN      cefdinir 300 MG capsule  Commonly known as: OMNICEF   300 mg, Oral, 2 Times Daily, Take first dose the evening of 11/2      ondansetron 4 MG tablet  Commonly known as: Zofran   4 mg, Oral, Every 8 Hours PRN      traMADol 50 MG tablet  Commonly known as: ULTRAM   50 mg, Oral, Every 8 Hours PRN         Changes to Medications      Instructions Start Date   estradiol 0.025 MG/24HR patch  Commonly known as: VIVELLE-DOT  What changed: See the new instructions.   PLACE 1 PATCH ON THE SKIN AS DIRECTED BY PROVIDER TWICE A WEEK         Continue These Medications      Instructions Start Date   CALCIUM 500 PO   1 tablet, Oral, 2 Times Daily      cholecalciferol 25 MCG (1000 UT) tablet  Commonly known as: VITAMIN D3   1,000 Units, Oral, Daily      diphenhydrAMINE 25 mg  Commonly known as: BENADRYL   25 mg, Oral, Every 6 Hours PRN      fish oil 1000 MG capsule capsule   1,000 mg, Oral, Daily With Breakfast      linaclotide 145 MCG capsule capsule  Commonly known as: Linzess   145 mcg, Oral, Every Morning Before Breakfast      Restasis 0.05 % ophthalmic emulsion  Generic drug: cycloSPORINE   1 drop, Both Eyes, 2 Times Daily         Stop These Medications    propranolol 40 MG tablet  Commonly known as: INDERAL     trimethoprim-polymyxin b 20463-8.1 UNIT/ML-% ophthalmic solution  Commonly known as: Polytrim            Allergies   Allergen Reactions   • Amitiza [Lubiprostone] Other (See Comments)     Migraines   • Codeine Hives   • Pantoprazole Nausea And Vomiting         Discharge Disposition:  Home or Self Care    Diet:  Hospital:  Diet Order   Procedures   • Diet Regular       Activity:  Activity  Instructions     Activity as Tolerated     Additional Activity Instructions:    Activity as tolerated.                CODE STATUS:    Code Status and Medical Interventions:   Ordered at: 10/31/22 1610     Code Status (Patient has no pulse and is not breathing):    CPR (Attempt to Resuscitate)     Medical Interventions (Patient has pulse or is breathing):    Full Support       Future Appointments   Date Time Provider Department Center   1/9/2023  1:30 PM [facility]   1/9/2023  2:00 PM Miguel Carvajal MD [facility]   2/20/2023  3:00 PM Dylan Nieto MD [facility]   10/26/2023  8:15 AM [facility]   11/2/2023  8:00 AM Minda Sanchez APRN [facility]         Additional Instructions for the Follow-ups that You Need to Schedule     Discharge Follow-up with PCP   As directed       Currently Documented PCP:    Minda Sanchez APRN    PCP Phone Number:    [phone]     Follow Up Details: Recommend primary care follow-up within 1 week for general hospital follow-up and to discuss migraine treatment         Discharge Follow-up with Specified Provider: Dr. Castro; 2 Weeks   As directed      To: Dr. Castro    Follow Up: 2 Weeks            Follow-up Information     Minda Sanchez APRN .    Specialty: Family Medicine  Why: Recommend primary care follow-up within 1 week for general hospital follow-up and to discuss migraine treatment  Contact information:  [address] [place] [address]  [phone]                             Carl Servin MD  11/02/22      Time Spent on Discharge:  I spent greater than 35 minutes on this discharge activity which included: face-to-face encounter with the patient, reviewing the data in the system, coordination of the care with the nursing staff as well as consultants, documentation, and entering orders.

## 2022-11-02 NOTE — CASE MANAGEMENT/SOCIAL WORK
Case Management Discharge Note      Final Note: home    Provided Post Acute Provider List?: N/A  N/A Provider List Comment: No needs identified  Provided Post Acute Provider Quality & Resource List?: N/A    Selected Continued Care - Discharged on 11/2/2022 Admission date: 10/31/2022 - Discharge disposition: Home or Self Care    Destination    No services have been selected for the patient.              Durable Medical Equipment    No services have been selected for the patient.              Dialysis/Infusion    No services have been selected for the patient.              Home Medical Care    No services have been selected for the patient.              Therapy    No services have been selected for the patient.              Community Resources    No services have been selected for the patient.              Community & DME    No services have been selected for the patient.                  Transportation Services  Private: Car    Final Discharge Disposition Code: 01 - home or self-care

## 2022-11-03 ENCOUNTER — TELEPHONE (OUTPATIENT)
Dept: INTERNAL MEDICINE | Facility: CLINIC | Age: 57
End: 2022-11-03

## 2022-11-03 ENCOUNTER — TRANSITIONAL CARE MANAGEMENT TELEPHONE ENCOUNTER (OUTPATIENT)
Dept: CALL CENTER | Facility: HOSPITAL | Age: 57
End: 2022-11-03

## 2022-11-03 NOTE — OUTREACH NOTE
Call Center TCM Note    Flowsheet Row Responses   [facility] patient discharged from? [facility]   Does the patient have one of the following disease processes/diagnoses(primary or secondary)? Other   TCM attempt successful? Yes   Call start time 1216   Call end time 1218   Discharge diagnosis Nausea and vomiting,   Leukocytosis,    Pyelonephritis   Meds reviewed with patient/caregiver? Yes   Is the patient having any side effects they believe may be caused by any medication additions or changes? No   Does the patient have all medications ordered at discharge? Yes   Is the patient taking all medications as directed (includes completed medication regime)? Yes   Comments states she will call later on today to make a f/u appt with PCP   Does the patient have an appointment with their PCP within 7 days of discharge? No   Nursing Interventions Patient declined scheduling/rescheduling appointment at this time   Has home health visited the patient within 72 hours of discharge? N/A   Psychosocial issues? No   Did the patient receive a copy of their discharge instructions? Yes   Nursing interventions Reviewed instructions with patient   What is the patient's perception of their health status since discharge? Improving   Is the patient/caregiver able to teach back signs and symptoms related to disease process for when to call PCP? Yes   Is the patient/caregiver able to teach back signs and symptoms related to disease process for when to call 911? Yes   Is the patient/caregiver able to teach back the hierarchy of who to call/visit for symptoms/problems? PCP, Specialist, Home health nurse, Urgent Care, ED, 911 Yes   TCM call completed? Yes   Wrap up additional comments Doing well, no questions, says she is going to f/u with urology and she will call later today to make a f/u appt with PCP.   Call end time 1218   Would this patient benefit from a Referral to Amb Social Work? No   Is the patient interested in additional  calls from an ambulatory ?  NOTE:  applies to high risk patients requiring additional follow-up. No          Tesha Walker RN    11/3/2022, 12:18 EDT

## 2022-11-03 NOTE — TELEPHONE ENCOUNTER
Caller: Iliana Reyes    Relationship to patient: Self    Best call back number:     Chief complaint:     Type of visit: HOSPITAL FOLLOW UP    Requested date:     If rescheduling, when is the original appointment:     Additional notes: PATIENT IS CALLING IN TO SCHEDULE A HOSPITAL FOLLOW UP APPOINTMENT AS SHE WAS DISCHARGED FROM [facility] 11/02/22 AND THERE ARE NO APPOINTMENTS IN THE 7 DAY TIME FRAME.  I TRIED TO CALL THE OFFICE FOR THE PATIENT BUT GOT NO ANSWER.

## 2022-11-03 NOTE — PAYOR COMM NOTE
"Iliana Stevens (56 y.o. Female)     PLEASE SEE ATTACHED CONTINUED STAY REVIEW AND DC SUMMARY    REF#[id number]    PLEASE CALL   OR  [phone]    THANK YOU    MARIAELENA LARA LPN CCP    Date of Birth   1965    Social Security Number       Address   [address] [place] [id number]    Home Phone   [phone]    MRN   [id number]       Jainism   Patient Refused    Marital Status                               Admission Date   10/31/22    Admission Type   Emergency    Admitting Provider   Edmar Aly MD    Attending Provider       Department, Room/Bed   [address], [address]/1       Discharge Date   11/2/2022    Discharge Disposition   Home or Self Care    Discharge Destination                               Attending Provider: (none)   Allergies: Amitiza [Lubiprostone], Codeine, Pantoprazole    Isolation: None   Infection: None   Code Status: Prior    Ht: 165.1 cm (65\")   Wt: 55.3 kg (122 lb)    Admission Cmt: None   Principal Problem: Leukocytosis, unspecified type [D72.829]                 Active Insurance as of 10/31/2022     Primary Coverage     Payor Plan Insurance Group Employer/Plan Group    [employer]UE [employer]UE [employer]OSS BLUE [employer]IELD PPO [id number]     Payor Plan Address Payor Plan Phone Number Payor Plan Fax Number Effective Dates    [address] [phone]  9/15/2009 - None Entered    [facility] [id number]       Subscriber Name Subscriber Birth Date Member ID       STAR STEVENS 12/4/1964 [id number]                 Emergency Contacts      (Rel.) Home Phone Work Phone Mobile Phone    Star Stevens (Spouse) [phone] -- --            Oxygen Therapy (last 2 days) before discharge     Date/Time SpO2 Device (Oxygen Therapy) Flow (L/min) Oxygen Concentration (%) ETCO2 (mmHg)    11/02/22 0756 97 room air -- -- --    11/02/22 0009 -- room air -- -- --    11/01/22 2321 95 room air -- -- --    11/01/22 1958 -- room air " -- -- --    22 1949 -- room air -- -- --    22 1400 -- room air -- -- --    22 1325 -- room air -- -- --    22 0900 -- room air -- -- --    22 0744 97 room air -- -- --    22 0403 97 room air -- -- --    10/31/22 2300 97 room air -- -- --    10/31/22 2022 -- room air -- -- --    10/31/22 1949 99 room air -- -- --    10/31/22 1533 100 nasal cannula 2 -- --    10/31/22 1501 100 nasal cannula 2 -- --    10/31/22 1500 100 -- -- -- --    10/31/22 1445 100 -- -- -- --    10/31/22 1431 100 -- -- -- --    10/31/22 1430 100 -- -- -- --    10/31/22 1231 94 -- -- -- --    10/31/22 1230 95 -- -- -- --    10/31/22 1216 95 -- -- -- --    10/31/22 1215 96 -- -- -- --    10/31/22 1200 96 -- -- -- --    10/31/22 1146 96 -- -- -- --    10/31/22 1145 96 -- -- -- --    10/31/22 1059 98 room air -- -- --    10/31/22 0944 98 room air -- -- --        Intake & Output (last 2 days)           IV Piggyback       Total Intake(mL/kg)       Net              Urine Unmeasured Occurrence 2 x 1 x     Emesis Unmeasured Occurrence 1 x          Lines, Drains & Airways     Active LDAs     None                Operative/Procedure Notes (last 48 hours)  Notes from 22 1006 through 22 100   No notes of this type exist for this encounter.          Physician Progress Notes (last 48 hours)      Carl Servin MD at 22 1014              [facility] Medicine Services  PROGRESS NOTE    Patient Name: Iliana Reyes  : 1965  MRN: [id number]    Date of Admission: 10/31/2022  Primary Care Physician: Minda Sanchez APRN    Subjective   Subjective     CC:  Follow-up right flank pain    HPI:  Nausea better today.  Right-sided pain slightly improved but still persists.  Patient is pleased with her progress.  She denies any new complaints.  She has her daughter at the bedside.  We discussed her treatment plan and all the  results so far.    Review of Systems  No current fevers or chills  No current shortness of breath or cough  No current chest pain or palpitations      Objective   Objective     Vital Signs:   Temp:  [97.5 °F (36.4 °C)-98.2 °F (36.8 °C)] 98.2 °F (36.8 °C)  Heart Rate:  [75-84] 75  Resp:  [14-16] 14  BP: ()/(51-79) 101/56        Physical Exam:  Constitutional:Awake, alert  HENT: NCAT, mucous membranes moist, neck supple  Respiratory: No cough or wheezes, respiratory effort normal, nonlabored breathing   Cardiovascular: normal radial pulses, regular rate  Gastrointestinal: Right-sided abdominal tenderness without guarding, soft, no hepatomegaly, nondistended  Musculoskeletal: Thin, BMI is 20, otherwise normal musculature for age, no lower extremity edema  Psychiatric: Appropriate affect, cooperative, conversational  Neurologic: No slurred speech or facial droop, follows commands  Skin: No rashes or jaundice, warm      Results Reviewed:  Results from last 7 days   Lab Units 11/01/22  0850 10/31/22  1114   WBC 10*3/mm3 13.88* 19.26*   HEMOGLOBIN g/dL 11.4* 14.0   HEMATOCRIT % 33.5* 41.8   PLATELETS 10*3/mm3 182 236     Results from last 7 days   Lab Units 10/31/22  1114   SODIUM mmol/L 138   POTASSIUM mmol/L 3.7   CHLORIDE mmol/L 98   CO2 mmol/L 29.0   BUN mg/dL 6   CREATININE mg/dL 0.51*   GLUCOSE mg/dL 126*   CALCIUM mg/dL 9.3   ALT (SGPT) U/L 7   AST (SGOT) U/L 18     Estimated Creatinine Clearance: 108.8 mL/min (A) (by C-G formula based on SCr of 0.51 mg/dL (L)).    Microbiology Results Abnormal     Procedure Component Value - Date/Time    Urine Culture - Urine, Urine, Clean Catch [[id number]]  (Normal) Collected: 10/31/22 1117    Lab Status: Preliminary result Specimen: Urine, Clean Catch Updated: 11/01/22 0533     Urine Culture No growth          Imaging Results (Last 24 Hours)     Procedure Component Value Units Date/Time    CT Abdomen Pelvis With Contrast [[id number]] Collected: 10/31/22 1301     Updated:  10/31/22 1316    Narrative:      CT ABDOMEN PELVIS W CONTRAST-     Radiation dose reduction techniques were utilized, including automated  exposure control and exposure modulation based on body size.           Clinical: Abdominal pain     FINDINGS: Both kidneys demonstrate asymmetric satisfactory pattern of  enhancement and there are tiny renal cortical cysts bilaterally. There  is perinephric fat stranding along the lower pole of the right kidney  and proximal ureter. There could be mild uroepithelial thickening of the  proximal ureter. Very subtle pyelocaliectasis without ureterectasis and  no UPJ abnormality. Differential to include pyelonephritis or UPJ  obstruction without obvious stone or tumor seen. The left renal  collecting system is within normal limits. The mid and distal right  ureter have a satisfactory appearance, no stone seen.     CBD diameter is approximately 6 mm status post cholecystectomy similar  to MRCP from 2019. Intrahepatic ducts are slightly prominent as before.  No CBD filling defect identified.     The liver parenchyma is typical in appearance. The spleen and pancreas  are normal. Both adrenal glands have typical appearance. Diameter of the  aorta is within normal limits. The urinary bladder is normal. No bladder  stone or wall thickening seen. Vaginal cuff is typical post  hysterectomy. Minimal diverticulosis of the colon, no diverticulitis.  The appendix is normal. The small bowel is satisfactory in appearance.  The stomach is collapsed, contour within normal limits, no duodenal  abnormality.     CONCLUSION:  1. Minimal right-sided perinephric fat stranding with nominal  pyelocaliectasis, no UPJ abnormality seen however there is subtle  induration and uroepithelial thickening of the proximal ureter,  pyelonephritis versus UPJ obstruction without obvious stone or tumor  seen on this examination.  2. Previous cholecystectomy. No change in the appearance of the biliary  tree, no indication  of CBD filling defect.  3. Minimal diverticulosis of the colon. The bowel is otherwise within  normal limits.        This report was finalized on 10/31/2022 1:13 PM by Dr. Flash Smith M.D.             Results for orders placed in visit on 12/01/21    Adult Transthoracic Echo Complete W/ Cont if Necessary Per Protocol    Interpretation Summary  · Calculated left ventricular EF = 54.9% Estimated left ventricular EF was in agreement with the calculated left ventricular EF. Left ventricular systolic function is normal.  · Left ventricular diastolic function was normal.  · There is mild mitral valve prolapse of the anterior mitral leaflet.  · Calculated right ventricular systolic pressure from tricuspid regurgitation is 19.4 mmHg.  · There is a trivial pericardial effusion.      I have reviewed the medications:  Scheduled Meds:calcium 500 mg vitamin D 5 mcg (200 UT), 1 tablet, Oral, BID  cefTRIAXone, 1 g, Intravenous, Q24H  cholecalciferol, 1,000 Units, Oral, Daily  cycloSPORINE, 1 drop, Both Eyes, BID  estradiol, 1 patch, Transdermal, Once per day on Tue Thu  linaclotide, 145 mcg, Oral, QAM AC  polyethylene glycol, 17 g, Oral, Daily  sodium chloride, 10 mL, Intravenous, Q12H      Continuous Infusions:sodium chloride, 150 mL/hr, Last Rate: 150 mL/hr (11/01/22 0757)      PRN Meds:.•  acetaminophen **OR** acetaminophen **OR** acetaminophen  •  Morphine  •  nitroglycerin  •  ondansetron  •  sodium chloride    Assessment & Plan   Assessment & Plan     Active Hospital Problems    Diagnosis  POA   • **Leukocytosis, unspecified type [D72.829]  Yes   • History of migraine [Z86.69]  Not Applicable   • Irritable bowel syndrome with constipation [K58.1]  Yes   • Dry eyes [H04.123]  Yes   • Pyelonephritis [N12]  Yes   • Hypotension [I95.9]  Yes      Resolved Hospital Problems   No resolved problems to display.        Brief Hospital Course to date:  Iliana Reyes is a 56 y.o. female presents the hospital with sepsis and  right-sided abdominal/flank pain and was found on CT scan to have perinephric inflammation concerning for acute pyelonephritis.  Urinalysis did not show significant pyuria felt to be consistent with more of an atypical presentation.    Discussion/plan:  Clinically patient seems to be improved.  Sepsis resolving.  Decreased IV fluid.  Continue ceftriaxone.  Follow-up on urology recommendations.  It is unusual to have pyelonephritis without pyuria but seems to clinically be consistent with pyelonephritis type presentation and findings on examination.  Patient appears to be responding to treatment.  Encourage oral intake.  Can bolus as needed if further hypotension were to occur.  Greater than 35 minutes spent in chart review, coordination of care, and counseling.  20 minutes spent at the bedside counseling patient regarding treatment plan and reviewing findings    Pyelonephritis with secondary sepsis:  Ceftriaxone.  Urology.  Improving.    Hypotension:  Due to sepsis.  Treated with IV fluid.  Resolved.  Monitor.    History of migraines:  Chronically treated with propranolol.  Propranolol held due to low blood pressure.  Monitor off this medication for now while infected.  Primary care follow-up and recommended to follow-up with neurologist    IBS with constipation: Continue Linzess.  MiraLAX.    History of dry eyes:  Continue chronic eyedrops.    DVT Prophylaxis: Mechanical and ambulatory      Disposition: Home when better    CODE STATUS:   Code Status and Medical Interventions:   Ordered at: 10/31/22 1610     Code Status (Patient has no pulse and is not breathing):    CPR (Attempt to Resuscitate)     Medical Interventions (Patient has pulse or is breathing):    Full Support       Carl Servin MD  11/01/22      Electronically signed by Carl Servin MD at 11/01/22 1020          Consult Notes (last 48 hours)      Jed Castro MD at 11/01/22 1628                 FIRST UROLOGY  CONSULT      Patient Identification:  NAME:  Iliana Reyes  Age:  56 y.o.   Sex:  female   :  1965   MRN:  [id number]       Chief complaint: Flank pain.      History of present illness:  H/o RUQ , R flank pain x 3 days. N/V. No F/C.  Ct scan mild pyelo R kidney. Minimal proximal ureteral dilation.  Feeling much better.  H/o renal stones.        Past medical history:  Past Medical History:   Diagnosis Date   • Leiva esophagus    • Chest pain    • Dyspepsia    • Gastric ulcer    • GERD (gastroesophageal reflux disease)    • Headache    • Irritable bowel syndrome    • Tubular adenoma of colon        Past surgical history:  Past Surgical History:   Procedure Laterality Date   • CHOLECYSTECTOMY      Dr. EDER Alba   • COLONOSCOPY  2016    polyp, tics, tubular adenoma   • COLONOSCOPY  2019    normal ileum, diverticulosis, NBIH, TAx1   • ENDOSCOPY  2016    gastric ulcer w/clean base, acute gastritis. Leiva's esophagus   • ENDOSCOPY  2019    normal esophagus/duodenum, acute gastritis   • HYSTERECTOMY     • OOPHORECTOMY     • SHOULDER ARTHROSCOPY      Dr. Plunkett   • UPPER GASTROINTESTINAL ENDOSCOPY  2016       Allergies:  Amitiza [lubiprostone], Codeine, and Pantoprazole    Home medications:  Medications Prior to Admission   Medication Sig Dispense Refill Last Dose   • Calcium-Magnesium-Vitamin D (CALCIUM 500 PO) Take 1 tablet by mouth 2 (Two) Times a Day.   Past Week   • cholecalciferol (VITAMIN D3) 25 MCG (1000 UT) tablet Take 1,000 Units by mouth Daily.   Past Week   • estradiol (VIVELLE-DOT) 0.025 MG/24HR patch PLACE 1 PATCH ON THE SKIN AS DIRECTED BY PROVIDER TWICE A WEEK (Patient taking differently: Place 1 patch on the skin as directed by provider 2 (Two) Times a Week.  and ) 8 patch 11 Past Week   • linaclotide (Linzess) 145 MCG capsule capsule Take 1 capsule by mouth Every Morning Before Breakfast. 90 capsule 3 Past Week   • Omega-3 Fatty Acids (FISH  OIL) 1000 MG capsule capsule Take 1 capsule by mouth Daily With Breakfast.   Past Week   • propranolol (INDERAL) 40 MG tablet Take 1 tablet by mouth 2 (Two) Times a Day. 180 tablet 1 Past Week   • Restasis 0.05 % ophthalmic emulsion Administer 1 drop to both eyes 2 (Two) Times a Day.   Past Week   • trimethoprim-polymyxin b (Polytrim) 87175-8.1 UNIT/ML-% ophthalmic solution Administer 1 drop to both eyes Every 4 (Four) Hours. 10 mL 0 Past Week   • diphenhydrAMINE (BENADRYL) 25 mg Take 25 mg by mouth every 6 (six) hours as needed for itching.   More than a month        Hospital medications:  calcium 500 mg vitamin D 5 mcg (200 UT), 1 tablet, Oral, BID  cefTRIAXone, 1 g, Intravenous, Q24H  cholecalciferol, 1,000 Units, Oral, Daily  cycloSPORINE, 1 drop, Both Eyes, BID  estradiol, 1 patch, Transdermal, Once per day on   linaclotide, 145 mcg, Oral, QAM AC  polyethylene glycol, 17 g, Oral, Daily  sodium chloride, 10 mL, Intravenous, Q12H      sodium chloride, 150 mL/hr, Last Rate: 150 mL/hr (22 1507)      •  acetaminophen **OR** acetaminophen **OR** acetaminophen  •  Morphine  •  nitroglycerin  •  ondansetron  •  sodium chloride    Family history:  Family History   Problem Relation Age of Onset   • Heart disease Mother    • Inflammatory bowel disease Mother    • Colon cancer Mother    • Colon polyps Mother    • Liver cancer Mother    • Ulcerative colitis Mother    • Cerebral aneurysm Father    • Crohn's disease Brother    • Colon polyps Brother    • Crohn's disease Cousin    • Crohn's disease Cousin    • Heart attack Brother 48   • Heart attack Brother 40       Social history:  Social History     Tobacco Use   • Smoking status: Former     Packs/day: 1.00     Years: 20.00     Pack years: 20.00     Types: Cigarettes     Quit date: 2016     Years since quittin.8   • Smokeless tobacco: Never   Vaping Use   • Vaping Use: Never used   Substance Use Topics   • Alcohol use: Yes     Alcohol/week: 2.0 standard  drinks     Types: 2 Glasses of wine per week     Comment: occ   • Drug use: No       Review of systems:      Positive for:  Flank pain.    Negative for:  Fever.      Objective:  TMax 24 hours:   Temp (24hrs), Av °F (36.7 °C), Min:97.5 °F (36.4 °C), Max:98.6 °F (37 °C)      Vitals Ranges:   Temp:  [97.5 °F (36.4 °C)-98.6 °F (37 °C)] 98.6 °F (37 °C)  Heart Rate:  [75-79] 79  Resp:  [14-16] 16  BP: ()/(51-67) 112/66    Intake/Output Last 3 shifts:  I/O last 3 completed shifts:  In:  [IV Piggyback:2100]  Out: -      Physical Exam:    General Appearance:    Alert, cooperative, NAD   HEENT:    No trauma, pupils reactive, hearing intact   Back:     No CVA tenderness   Lungs:     Respirations unlabored, no wheezing    Heart:    RRR.   Abdomen:     Soft, NDNT, no masses, no guarding   :    Pelvic not performed, bladder nondistended and nontender   Extremities:     Lymphatic:   No neck or groin LAD   Skin:   No bleeding, bruising or rashes   Neuro/Psych:   Orientation intact, mood/affect pleasant, no focal findings       Results review:   I reviewed the patient's new clinical results.    Data review:  Lab Results (last 24 hours)     Procedure Component Value Units Date/Time    Blood Culture - Blood, Arm, Right [[id number]]  (Normal) Collected: 10/31/22 1415    Specimen: Blood from Arm, Right Updated: 22 1446     Blood Culture No growth at 24 hours    Blood Culture - Blood, Arm, Left [[id number]]  (Normal) Collected: 10/31/22 1416    Specimen: Blood from Arm, Left Updated: 22 1431     Blood Culture No growth at 24 hours    Comprehensive Metabolic Panel [[id number]]  (Abnormal) Collected: 22 0850    Specimen: Blood Updated: 22 1029     Glucose 71 mg/dL      BUN 5 mg/dL      Creatinine 0.36 mg/dL      Sodium 140 mmol/L      Potassium 3.2 mmol/L      Chloride 106 mmol/L      CO2 23.0 mmol/L      Calcium 8.5 mg/dL      Total Protein 5.5 g/dL      Albumin 3.40 g/dL      ALT (SGPT) 12 U/L       AST (SGOT) 19 U/L      Alkaline Phosphatase 76 U/L      Total Bilirubin 0.8 mg/dL      Globulin 2.1 gm/dL      A/G Ratio 1.6 g/dL      BUN/Creatinine Ratio 13.9     Anion Gap 11.0 mmol/L      eGFR 120.1 mL/min/1.73      Comment: National Kidney Foundation and American Society of Nephrology (ASN) Task Force recommended calculation based on the Chronic Kidney Disease Epidemiology Collaboration (CKD-EPI) equation refit without adjustment for race.       Narrative:      GFR Normal >60  Chronic Kidney Disease <60  Kidney Failure <15      Lactic Acid, Plasma [[id number]]  (Normal) Collected: 11/01/22 0850    Specimen: Blood Updated: 11/01/22 1023     Lactate 1.3 mmol/L     CBC Auto Differential [[id number]]  (Abnormal) Collected: 11/01/22 0850    Specimen: Blood Updated: 11/01/22 0953     WBC 13.88 10*3/mm3      RBC 3.75 10*6/mm3      Hemoglobin 11.4 g/dL      Hematocrit 33.5 %      MCV 89.3 fL      MCH 30.4 pg      MCHC 34.0 g/dL      RDW 11.5 %      RDW-SD 37.5 fl      MPV 12.2 fL      Platelets 182 10*3/mm3      Neutrophil % 81.0 %      Lymphocyte % 11.2 %      Monocyte % 7.0 %      Eosinophil % 0.1 %      Basophil % 0.1 %      Immature Grans % 0.6 %      Neutrophils, Absolute 11.23 10*3/mm3      Lymphocytes, Absolute 1.56 10*3/mm3      Monocytes, Absolute 0.97 10*3/mm3      Eosinophils, Absolute 0.02 10*3/mm3      Basophils, Absolute 0.02 10*3/mm3      Immature Grans, Absolute 0.08 10*3/mm3      nRBC 0.0 /100 WBC     Urine Culture - Urine, Urine, Clean Catch [[id number]]  (Normal) Collected: 10/31/22 1117    Specimen: Urine, Clean Catch Updated: 11/01/22 0533     Urine Culture No growth           Imaging:  Imaging Results (Last 24 Hours)     ** No results found for the last 24 hours. **             Assessment:       Leukocytosis, unspecified type    Pyelonephritis    Hypotension    History of migraine    Irritable bowel syndrome with constipation    Dry eyes    Pyelonephritis.      Plan:     Pt improving. Minimal  proximal ureteral dilation secondary to infection.  No need for stent.  Recommend 2 week course antibx.    F/u Dr. Castro 2 weeks BOUBACAR.      Jed Castro MD  22  16:28 EDT        Electronically signed by Jed Castro MD at 22 1631          Discharge Summary      Carl Servin MD at 22 0957              [facility] Medicine Services  DISCHARGE SUMMARY    Patient Name: Iliana Reyes  : 1965  MRN: [id number]    Date of Admission: 10/31/2022  9:53 AM  Date of Discharge: 2022  Primary Care Physician: Minda Sanchez APRN    Consults     Date and Time Order Name Status Description    10/31/2022  1:56 PM [facility] (on-call MD unless specified) Details      10/31/2022  1:28 PM Urology (on-call MD unless specified)            Hospital Course       Active Hospital Problems    Diagnosis  POA   • **Leukocytosis, unspecified type [D72.829]  Yes   • Inflammation of ureter seen on CT scan [N28.89]  Yes   • Abnormal CT scan, kidney [R93.429]  Unknown   • History of migraine [Z86.69]  Not Applicable   • Irritable bowel syndrome with constipation [K58.1]  Yes   • Dry eyes [H04.123]  Yes   • Pyelonephritis [N12]  Yes      Resolved Hospital Problems    Diagnosis Date Resolved POA   • Nausea and vomiting [R11.2] 2022 Yes   • Hypotension [I95.9] 2022 Yes          Hospital Course:  Iliana Reyes is a 56 y.o. female presented to the hospital with flank pain, leukocytosis, and abnormal CT scan of the kidney and ureter showing inflammation concerning for pyelonephritis versus UPJ obstruction.  Please see CT scan report below for full details.  Patient was evaluated by urology who recommended a 2-week course of antibiotic.  Leukocytosis has improved and patient has clinically improved.  She is still having some intermittent nausea and pain but this is drastically better.  Patient appears hemodynamically stable to discharge today and is very eager to do so.  She  agrees to follow-up with urology and primary care provider.  Urology is planning to follow-up in clinic in approximately 2 weeks with repeat imaging ultrasound to further evaluate kidney findings.  At the time of discharge patient was told to take all medications as prescribed, keep all follow-up appointments, and call their doctor or return to the hospital with any worsening or concerning symptoms.    Please note that this note was made using Dragon voice recognition software            Day of Discharge     HPI:   Feels much better.  Only mild nausea and tolerating diet.  Some pain but drastically improved.  Patient is very eager to discharge today.  Her daughter who is a nurse at that the bedside and is also eager for her mother to be discharged.  No new complaints reported    ROS:  No current fevers or chills  No current shortness of breath or cough  No current nausea, vomiting, or diarrhea  No current chest pain or palpitations      Vital Signs:   Temp:  [97.9 °F (36.6 °C)-98.6 °F (37 °C)] 97.9 °F (36.6 °C)  Heart Rate:  [74-93] 76  Resp:  [16-18] 18  BP: (104-125)/(62-69) 109/68     Physical Exam:  Constitutional:Awake, alert  HENT: NCAT, mucous membranes moist, neck supple  Respiratory: Clear to auscultation bilaterally, respiratory effort normal, nonlabored breathing   Cardiovascular: RRR, normal radial pulses  Gastrointestinal: Positive bowel sounds, soft, mild right tenderness, nondistended  Musculoskeletal: Normal musculature for age, no lower extremity edema, BMI 20, thin  Psychiatric: Appropriate affect, cooperative, conversational  Neurologic: No slurred speech or facial droop, follows commands  Skin: No rashes or jaundice, warm      Pertinent  and/or Most Recent Results     Results from last 7 days   Lab Units 11/02/22  0426 11/01/22  0850 10/31/22  1114   WBC 10*3/mm3 12.80* 13.88* 19.26*   HEMOGLOBIN g/dL 10.6* 11.4* 14.0   HEMATOCRIT % 31.6* 33.5* 41.8   PLATELETS 10*3/mm3 162 182 236   SODIUM  mmol/L 138 140 138   POTASSIUM mmol/L 4.2 3.2* 3.7   CHLORIDE mmol/L 105 106 98   CO2 mmol/L 25.0 23.0 29.0   BUN mg/dL 3* 5* 6   CREATININE mg/dL 0.34* 0.36* 0.51*   GLUCOSE mg/dL 100* 71 126*   CALCIUM mg/dL 8.0* 8.5* 9.3     Results from last 7 days   Lab Units 11/01/22  0850 10/31/22  1114   BILIRUBIN mg/dL 0.8 1.1   ALK PHOS U/L 76 74   ALT (SGPT) U/L 12 7   AST (SGOT) U/L 19 18           Invalid input(s): TG, LDLCALC, LDLREALC  Results from last 7 days   Lab Units 11/01/22  0850 10/31/22  1114   LACTATE mmol/L 1.3 1.9       Brief Urine Lab Results  (Last result in the past 365 days)      Color   Clarity   Blood   Leuk Est   Nitrite   Protein   CREAT   Urine HCG        10/31/22 1117 Yellow   Clear   Negative   Negative   Negative   Negative                 Microbiology Results Abnormal     Procedure Component Value - Date/Time    Urine Culture - Urine, Urine, Clean Catch [[id number]]  (Normal) Collected: 10/31/22 1117    Lab Status: Final result Specimen: Urine, Clean Catch Updated: 11/01/22 1842     Urine Culture No growth    Blood Culture - Blood, Arm, Right [[id number]]  (Normal) Collected: 10/31/22 1415    Lab Status: Preliminary result Specimen: Blood from Arm, Right Updated: 11/01/22 1446     Blood Culture No growth at 24 hours    Blood Culture - Blood, Arm, Left [[id number]]  (Normal) Collected: 10/31/22 1416    Lab Status: Preliminary result Specimen: Blood from Arm, Left Updated: 11/01/22 1431     Blood Culture No growth at 24 hours          Imaging Results (All)     Procedure Component Value Units Date/Time    CT Abdomen Pelvis With Contrast [[id number]] Collected: 10/31/22 1301     Updated: 10/31/22 1316    Narrative:      CT ABDOMEN PELVIS W CONTRAST-     Radiation dose reduction techniques were utilized, including automated  exposure control and exposure modulation based on body size.           Clinical: Abdominal pain     FINDINGS: Both kidneys demonstrate asymmetric satisfactory pattern  of  enhancement and there are tiny renal cortical cysts bilaterally. There  is perinephric fat stranding along the lower pole of the right kidney  and proximal ureter. There could be mild uroepithelial thickening of the  proximal ureter. Very subtle pyelocaliectasis without ureterectasis and  no UPJ abnormality. Differential to include pyelonephritis or UPJ  obstruction without obvious stone or tumor seen. The left renal  collecting system is within normal limits. The mid and distal right  ureter have a satisfactory appearance, no stone seen.     CBD diameter is approximately 6 mm status post cholecystectomy similar  to MRCP from 2019. Intrahepatic ducts are slightly prominent as before.  No CBD filling defect identified.     The liver parenchyma is typical in appearance. The spleen and pancreas  are normal. Both adrenal glands have typical appearance. Diameter of the  aorta is within normal limits. The urinary bladder is normal. No bladder  stone or wall thickening seen. Vaginal cuff is typical post  hysterectomy. Minimal diverticulosis of the colon, no diverticulitis.  The appendix is normal. The small bowel is satisfactory in appearance.  The stomach is collapsed, contour within normal limits, no duodenal  abnormality.     CONCLUSION:  1. Minimal right-sided perinephric fat stranding with nominal  pyelocaliectasis, no UPJ abnormality seen however there is subtle  induration and uroepithelial thickening of the proximal ureter,  pyelonephritis versus UPJ obstruction without obvious stone or tumor  seen on this examination.  2. Previous cholecystectomy. No change in the appearance of the biliary  tree, no indication of CBD filling defect.  3. Minimal diverticulosis of the colon. The bowel is otherwise within  normal limits.        This report was finalized on 10/31/2022 1:13 PM by Dr. Flash Smith M.D.                     Results for orders placed in visit on 12/01/21    Adult Transthoracic Echo Complete W/ Cont  if Necessary Per Protocol    Interpretation Summary  · Calculated left ventricular EF = 54.9% Estimated left ventricular EF was in agreement with the calculated left ventricular EF. Left ventricular systolic function is normal.  · Left ventricular diastolic function was normal.  · There is mild mitral valve prolapse of the anterior mitral leaflet.  · Calculated right ventricular systolic pressure from tricuspid regurgitation is 19.4 mmHg.  · There is a trivial pericardial effusion.      Discharge Details        Discharge Medications      New Medications      Instructions Start Date   acetaminophen 325 MG tablet  Commonly known as: TYLENOL   650 mg, Oral, Every 6 Hours PRN      cefdinir 300 MG capsule  Commonly known as: OMNICEF   300 mg, Oral, 2 Times Daily, Take first dose the evening of 11/2      ondansetron 4 MG tablet  Commonly known as: Zofran   4 mg, Oral, Every 8 Hours PRN      traMADol 50 MG tablet  Commonly known as: ULTRAM   50 mg, Oral, Every 8 Hours PRN         Changes to Medications      Instructions Start Date   estradiol 0.025 MG/24HR patch  Commonly known as: VIVELLE-DOT  What changed: See the new instructions.   PLACE 1 PATCH ON THE SKIN AS DIRECTED BY PROVIDER TWICE A WEEK         Continue These Medications      Instructions Start Date   CALCIUM 500 PO   1 tablet, Oral, 2 Times Daily      cholecalciferol 25 MCG (1000 UT) tablet  Commonly known as: VITAMIN D3   1,000 Units, Oral, Daily      diphenhydrAMINE 25 mg  Commonly known as: BENADRYL   25 mg, Oral, Every 6 Hours PRN      fish oil 1000 MG capsule capsule   1,000 mg, Oral, Daily With Breakfast      linaclotide 145 MCG capsule capsule  Commonly known as: Linzess   145 mcg, Oral, Every Morning Before Breakfast      Restasis 0.05 % ophthalmic emulsion  Generic drug: cycloSPORINE   1 drop, Both Eyes, 2 Times Daily         Stop These Medications    propranolol 40 MG tablet  Commonly known as: INDERAL     trimethoprim-polymyxin b 27014-2.1 UNIT/ML-%  ophthalmic solution  Commonly known as: Polytrim            Allergies   Allergen Reactions   • Amitiza [Lubiprostone] Other (See Comments)     Migraines   • Codeine Hives   • Pantoprazole Nausea And Vomiting         Discharge Disposition:  Home or Self Care    Diet:  Hospital:  Diet Order   Procedures   • Diet Regular       Activity:  Activity Instructions     Activity as Tolerated     Additional Activity Instructions:    Activity as tolerated.                CODE STATUS:    Code Status and Medical Interventions:   Ordered at: 10/31/22 1610     Code Status (Patient has no pulse and is not breathing):    CPR (Attempt to Resuscitate)     Medical Interventions (Patient has pulse or is breathing):    Full Support       Future Appointments   Date Time Provider Department Center   1/9/2023  1:30 PM [facility]   1/9/2023  2:00 PM Miguel Carvajal MD [facility]   2/20/2023  3:00 PM Dylan Nieto MD [facility]   10/26/2023  8:15 AM [facility]   11/2/2023  8:00 AM Minda Sanchez APRN [facility]         Additional Instructions for the Follow-ups that You Need to Schedule     Discharge Follow-up with PCP   As directed       Currently Documented PCP:    Minda Sanchez APRN    PCP Phone Number:    [phone]     Follow Up Details: Recommend primary care follow-up within 1 week for general hospital follow-up and to discuss migraine treatment         Discharge Follow-up with Specified Provider: Dr. Castro; 2 Weeks   As directed      To: Dr. Castro    Follow Up: 2 Weeks            Follow-up Information     Minda Sanchez APRN .    Specialty: Family Medicine  Why: Recommend primary care follow-up within 1 week for general hospital follow-up and to discuss migraine treatment  Contact information:  [address] [place] [address]  [phone]                             Carl Servin  MD  11/02/22      Time Spent on Discharge:  I spent greater than 35 minutes on this discharge activity which included: face-to-face encounter with the patient, reviewing the data in the system, coordination of the care with the nursing staff as well as consultants, documentation, and entering orders.        Electronically signed by Carl Servin MD at 11/02/22 1001       Discharge Order (From admission, onward)     Start     Ordered    11/02/22 0946  Discharge patient  Once        Expected Discharge Date: 11/02/22    Expected Discharge Time: Morning    Discharge Disposition: Home or Self Care    Physician of Record for Attribution - Please select from Treatment Team: CARL SERVIN [[id number]]    Review needed by CMO to determine Physician of Record: No       Question Answer Comment   Physician of Record for Attribution - Please select from Treatment Team CARL SERVIN    Review needed by CMO to determine Physician of Record No        11/02/22 0946    11/02/22 0919  Discharge patient  Once,   Status:  Canceled        Expected Discharge Date: 11/02/22    Discharge Disposition: Home or Self Care    Physician of Record for Attribution - Please select from Treatment Team: CARL SERVIN [[id number]]    Review needed by CMO to determine Physician of Record: No       Question Answer Comment   Physician of Record for Attribution - Please select from Treatment Team CARL SERVIN    Review needed by CMO to determine Physician of Record No        11/02/22 0918

## 2022-11-05 LAB
BACTERIA SPEC AEROBE CULT: NORMAL
BACTERIA SPEC AEROBE CULT: NORMAL

## 2022-11-07 ENCOUNTER — OFFICE VISIT (OUTPATIENT)
Dept: INTERNAL MEDICINE | Facility: CLINIC | Age: 57
End: 2022-11-07

## 2022-11-07 VITALS
BODY MASS INDEX: 19.99 KG/M2 | SYSTOLIC BLOOD PRESSURE: 100 MMHG | WEIGHT: 120 LBS | HEIGHT: 65 IN | DIASTOLIC BLOOD PRESSURE: 60 MMHG | TEMPERATURE: 97.8 F

## 2022-11-07 DIAGNOSIS — N12 PYELONEPHRITIS: ICD-10-CM

## 2022-11-07 DIAGNOSIS — Z86.69 HISTORY OF MIGRAINE: Primary | ICD-10-CM

## 2022-11-07 DIAGNOSIS — I67.1 CEREBRAL ANEURYSM WITHOUT RUPTURE: ICD-10-CM

## 2022-11-07 PROCEDURE — 99495 TRANSJ CARE MGMT MOD F2F 14D: CPT | Performed by: PHYSICIAN ASSISTANT

## 2022-11-07 NOTE — PROGRESS NOTES
Subjective   Chief Complaint   Patient presents with   • Hospital Follow Up Visit       History of Present Illness     She is on Cefdinir 300 mg BID currently, toleratingok currently. Flank pain is improving, still having a lot of soreness. Still feels a little swollen. Blood cultures x 2 were normal. Sees urology next week and will have renal US for follow up.      Patient Active Problem List   Diagnosis   • ASCUS with positive high risk HPV cervical   • Headache   • Cerebral aneurysm without rupture   • Leukocytosis, unspecified type   • Pyelonephritis   • History of migraine   • Irritable bowel syndrome with constipation   • Dry eyes   • Inflammation of ureter seen on CT scan   • Abnormal CT scan, kidney       Allergies   Allergen Reactions   • Amitiza [Lubiprostone] Other (See Comments)     Migraines   • Codeine Hives   • Pantoprazole Nausea And Vomiting       Current Outpatient Medications on File Prior to Visit   Medication Sig Dispense Refill   • acetaminophen (TYLENOL) 325 MG tablet Take 2 tablets by mouth Every 6 (Six) Hours As Needed for Mild Pain.     • Calcium-Magnesium-Vitamin D (CALCIUM 500 PO) Take 1 tablet by mouth 2 (Two) Times a Day.     • cefdinir (OMNICEF) 300 MG capsule Take 1 capsule by mouth 2 (Two) Times a Day for 25 doses. Take first dose the evening of 11/2 25 capsule 0   • cholecalciferol (VITAMIN D3) 25 MCG (1000 UT) tablet Take 1,000 Units by mouth Daily.     • diphenhydrAMINE (BENADRYL) 25 mg Take 25 mg by mouth every 6 (six) hours as needed for itching.     • estradiol (VIVELLE-DOT) 0.025 MG/24HR patch PLACE 1 PATCH ON THE SKIN AS DIRECTED BY PROVIDER TWICE A WEEK (Patient taking differently: Place 1 patch on the skin as directed by provider 2 (Two) Times a Week. Tuesdays and Thursdays) 8 patch 11   • linaclotide (Linzess) 145 MCG capsule capsule Take 1 capsule by mouth Every Morning Before Breakfast. 90 capsule 3   • Omega-3 Fatty Acids (FISH OIL) 1000 MG capsule capsule Take 1  capsule by mouth Daily With Breakfast.     • ondansetron (Zofran) 4 MG tablet Take 1 tablet by mouth Every 8 (Eight) Hours As Needed for Nausea or Vomiting. 15 tablet 0   • Restasis 0.05 % ophthalmic emulsion Administer 1 drop to both eyes 2 (Two) Times a Day.     • traMADol (ULTRAM) 50 MG tablet Take 1 tablet by mouth Every 8 (Eight) Hours As Needed for Moderate Pain. 10 tablet 0     No current facility-administered medications on file prior to visit.       Past Medical History:   Diagnosis Date   • Leiva esophagus    • Chest pain    • Dyspepsia    • Gastric ulcer    • GERD (gastroesophageal reflux disease)    • Headache    • Irritable bowel syndrome    • Tubular adenoma of colon        Family History   Problem Relation Age of Onset   • Heart disease Mother    • Inflammatory bowel disease Mother    • Colon cancer Mother    • Colon polyps Mother    • Liver cancer Mother    • Ulcerative colitis Mother    • Cerebral aneurysm Father    • Crohn's disease Brother    • Colon polyps Brother    • Crohn's disease Cousin    • Crohn's disease Cousin    • Heart attack Brother 48   • Heart attack Brother 40       Social History     Socioeconomic History   • Marital status:    Tobacco Use   • Smoking status: Former     Packs/day: 1.00     Years: 20.00     Pack years: 20.00     Types: Cigarettes     Quit date: 2016     Years since quittin.8   • Smokeless tobacco: Never   Vaping Use   • Vaping Use: Never used   Substance and Sexual Activity   • Alcohol use: Yes     Alcohol/week: 2.0 standard drinks     Types: 2 Glasses of wine per week     Comment: occ   • Drug use: No   • Sexual activity: Yes     Partners: Male     Birth control/protection: None       Past Surgical History:   Procedure Laterality Date   • CHOLECYSTECTOMY      Dr. EDER Alba   • COLONOSCOPY  2016    polyp, tics, tubular adenoma   • COLONOSCOPY  2019    normal ileum, diverticulosis, NBIH, TAx1   • ENDOSCOPY  2016    gastric ulcer  "w/clean base, acute gastritis. Leiva's esophagus   • ENDOSCOPY  06/05/2019    normal esophagus/duodenum, acute gastritis   • HYSTERECTOMY     • OOPHORECTOMY     • SHOULDER ARTHROSCOPY      Dr. Plunkett   • UPPER GASTROINTESTINAL ENDOSCOPY  05/18/2016     The following portions of the patient's history were reviewed and updated as appropriate: problem list, allergies, current medications, past medical history, past family history, past social history and past surgical history.    ROS     See HPI    Immunization History   Administered Date(s) Administered   • COVID-19 (PFIZER) PURPLE CAP 03/27/2021, 04/19/2021, 12/07/2021   • Flu Vaccine Quad PF >36MO 10/14/2020   • FluLaval/Fluzone >6mos 10/14/2020, 10/14/2021, 10/27/2022   • Flublok 18+yrs 04/21/2022   • Hepatitis A 05/05/2018   • Influenza Quad Vaccine (Inpatient) 09/28/2019   • Pneumococcal Polysaccharide (PPSV23) 02/21/2022   • Tdap 01/23/2020       Objective   Vitals:    11/07/22 1049 11/07/22 1112   BP:  100/60   Temp: 97.8 °F (36.6 °C)    Weight: 54.4 kg (120 lb)    Height: 165.1 cm (65\")      Body mass index is 19.97 kg/m².  Physical Exam      Assessment & Plan   There are no diagnoses linked to this encounter.    No follow-ups on file.           "

## 2022-11-07 NOTE — PROGRESS NOTES
Transitional Care Follow Up Visit  Subjective     Iliana Reyes is a 56 y.o. female who presents for a transitional care management visit.    Within 48 business hours after discharge our office contacted her via telephone to coordinate her care and needs.      I reviewed and discussed the details of that call along with the discharge summary, hospital problems, inpatient lab results, inpatient diagnostic studies, and consultation reports with Iliana.     Current outpatient and discharge medications have been reconciled for the patient.  Reviewed by: Susana Head PA-C      Date of TCM Phone Call 11/2/2022   [facility]   Date of Admission 10/31/2022   Date of Discharge 11/2/2022   Discharge Disposition Home or Self Care     Risk for Readmission (LACE) Score: 6 (11/2/2022  6:00 AM)      History of Present Illness     Admitted on 10/31 with right flank pain had leukocytosis and pyelo on CT. She is on Cefdinir 300 mg BID currently, tolerating ok currently. Flank pain is improving, still having a lot of soreness. Still feels a little swollen. Blood cultures x 2 were normal. Sees urology next week and will have renal US for follow up. No difficulty urinating. No fever or chills.     Having worsened headaches, was on Propranolol. BP and heart rate low during admission was advised to stop. Her insurance will now cover Aimovig would like to start. Has a cerebral aneurysm followed by Dr. Nieto. IS scheduled to see him in February.     Course During Hospital Stay:  See HPI     The following portions of the patient's history were reviewed and updated as appropriate: allergies, current medications and problem list.    Review of Systems   Gastrointestinal:        Flank pain that is improving   Genitourinary: Negative for difficulty urinating.   Neurological: Positive for headaches.       Objective   Physical Exam  Vitals reviewed.   Constitutional:       Appearance: Normal appearance.   HENT:      Head:  Normocephalic and atraumatic.   Cardiovascular:      Rate and Rhythm: Normal rate and regular rhythm.      Heart sounds: Normal heart sounds.   Pulmonary:      Effort: Pulmonary effort is normal.   Abdominal:      Comments: Slight soreness over right flank   Neurological:      Mental Status: She is alert.         Assessment & Plan   Diagnoses and all orders for this visit:    1. History of migraine (Primary)    2. Cerebral aneurysm without rupture    3. Pyelonephritis    Reviewed hospital records. Will continue the Cefdinir and follow up with urology next week and will have Renal US at that time. Aimovig PA has been started to begin for her migraine headaches. Will ask Dr. Nieto if he would like to see patient sooner than February with her ongoing headaches as she reports they are worse.

## 2023-01-06 ENCOUNTER — TELEPHONE (OUTPATIENT)
Dept: GASTROENTEROLOGY | Facility: CLINIC | Age: 58
End: 2023-01-06
Payer: COMMERCIAL

## 2023-01-06 NOTE — TELEPHONE ENCOUNTER
Caller: Iliana Reyes    Relationship: Self    Best call back number:[phone]     Requested Prescriptions: LINZESS    Additional details provided by patient: PATIENT HAS AN APPT WITH KALIE GARCIA ON 1/13/23 AT 8:45 AM    Does the patient have less than a 3 day supply:  [x] Yes  [] No    Would you like a call back once the refill request has been completed: [x] Yes [] No    Amena Graham Rep   01/06/23 14:41 EST

## 2023-01-09 ENCOUNTER — OFFICE VISIT (OUTPATIENT)
Dept: OBSTETRICS AND GYNECOLOGY | Facility: CLINIC | Age: 58
End: 2023-01-09
Payer: COMMERCIAL

## 2023-01-09 ENCOUNTER — PROCEDURE VISIT (OUTPATIENT)
Dept: OBSTETRICS AND GYNECOLOGY | Facility: CLINIC | Age: 58
End: 2023-01-09
Payer: COMMERCIAL

## 2023-01-09 ENCOUNTER — TELEPHONE (OUTPATIENT)
Dept: GASTROENTEROLOGY | Facility: CLINIC | Age: 58
End: 2023-01-09
Payer: COMMERCIAL

## 2023-01-09 VITALS
DIASTOLIC BLOOD PRESSURE: 72 MMHG | SYSTOLIC BLOOD PRESSURE: 112 MMHG | BODY MASS INDEX: 19.83 KG/M2 | HEIGHT: 65 IN | WEIGHT: 119 LBS

## 2023-01-09 DIAGNOSIS — M85.80 OSTEOPENIA, UNSPECIFIED LOCATION: ICD-10-CM

## 2023-01-09 DIAGNOSIS — Z12.31 VISIT FOR SCREENING MAMMOGRAM: Primary | ICD-10-CM

## 2023-01-09 DIAGNOSIS — Z01.411 ENCOUNTER FOR GYNECOLOGICAL EXAMINATION WITH ABNORMAL FINDING: Primary | ICD-10-CM

## 2023-01-09 DIAGNOSIS — N95.1 MENOPAUSAL SYMPTOMS: ICD-10-CM

## 2023-01-09 DIAGNOSIS — N63.14 MASS OF LOWER INNER QUADRANT OF RIGHT BREAST: ICD-10-CM

## 2023-01-09 PROCEDURE — 99396 PREV VISIT EST AGE 40-64: CPT | Performed by: OBSTETRICS & GYNECOLOGY

## 2023-01-09 PROCEDURE — 99212 OFFICE O/P EST SF 10 MIN: CPT | Performed by: OBSTETRICS & GYNECOLOGY

## 2023-01-09 PROCEDURE — 77063 BREAST TOMOSYNTHESIS BI: CPT | Performed by: OBSTETRICS & GYNECOLOGY

## 2023-01-09 PROCEDURE — 77067 SCR MAMMO BI INCL CAD: CPT | Performed by: OBSTETRICS & GYNECOLOGY

## 2023-01-09 RX ORDER — ESTRADIOL 0.03 MG/D
1 FILM, EXTENDED RELEASE TRANSDERMAL 2 TIMES WEEKLY
Qty: 24 PATCH | Refills: 3 | Status: SHIPPED | OUTPATIENT
Start: 2023-01-09

## 2023-01-09 RX ORDER — ERENUMAB-AOOE 140 MG/ML
INJECTION, SOLUTION SUBCUTANEOUS
COMMUNITY
Start: 2022-12-13

## 2023-01-09 NOTE — PROGRESS NOTES
Subjective    Chief Complaint   Patient presents with   • Gynecologic Exam     AE, mammo today       History of Present Illness    Iliana Reyes is a 57 y.o. female who presents for annual exam.  Non-smoker.  Mammogram today.  DEXA scan May 2019 osteopenia.  Colonoscopy 2019 showed polyps.  Previous hysterectomy with BSO.  Patient has been on the 0.025 Vivelle-Dot twice weekly.  History of mild vaginal dysplasia with recent Paps negative.    Obstetric History:  OB History        2    Para   2    Term   2            AB        Living           SAB        IAB        Ectopic        Molar        Multiple        Live Births                   Menstrual History:     No LMP recorded (lmp unknown). Patient has had a hysterectomy.       Past Medical History:   Diagnosis Date   • Leiva esophagus    • Chest pain    • Dyspepsia    • Gastric ulcer    • GERD (gastroesophageal reflux disease)    • Headache    • Irritable bowel syndrome    • Tubular adenoma of colon      Family History   Problem Relation Age of Onset   • Heart disease Mother    • Inflammatory bowel disease Mother    • Colon cancer Mother    • Colon polyps Mother    • Liver cancer Mother    • Ulcerative colitis Mother    • Cerebral aneurysm Father    • Crohn's disease Brother    • Colon polyps Brother    • Crohn's disease Cousin    • Crohn's disease Cousin    • Heart attack Brother 48   • Heart attack Brother 40     Social History     Tobacco Use   Smoking Status Former   • Packs/day: 1.00   • Years: 20.00   • Pack years: 20.00   • Types: Cigarettes   • Quit date:    • Years since quittin.0   Smokeless Tobacco Never         The following portions of the patient's history were reviewed and updated as appropriate: allergies, current medications, past family history, past medical history, past social history, past surgical history and problem list.    Review of Systems   Constitutional: Negative.  Negative for fever and unexpected weight  change.   HENT: Negative.    Respiratory: Negative for shortness of breath and wheezing.    Cardiovascular: Negative for chest pain, palpitations and leg swelling.   Gastrointestinal: Negative for abdominal pain, anal bleeding and blood in stool.   Genitourinary: Negative for dysuria, pelvic pain, urgency, vaginal bleeding, vaginal discharge and vaginal pain.   Skin: Negative.    Neurological: Negative.    Hematological: Negative.  Negative for adenopathy.   Psychiatric/Behavioral: Negative.  Negative for dysphoric mood. The patient is not nervous/anxious.             Objective   Physical Exam  Vitals reviewed. Exam conducted with a chaperone present.   Constitutional:       Appearance: She is well-developed.   HENT:      Head: Normocephalic.   Eyes:      Pupils: Pupils are equal, round, and reactive to light.   Neck:      Thyroid: No thyromegaly.      Trachea: No tracheal deviation.   Cardiovascular:      Rate and Rhythm: Normal rate and regular rhythm.      Heart sounds: Normal heart sounds. No murmur heard.  Pulmonary:      Effort: Pulmonary effort is normal. No respiratory distress.      Breath sounds: Normal breath sounds.   Chest:   Breasts:     Right: Mass and tenderness present. No nipple discharge.      Left: Normal. No mass, nipple discharge or tenderness.          Comments: Tender 2 cm mass just medial to right nipple.  Patient has already had a mammogram performed today.  Abdominal:      Palpations: Abdomen is soft. There is no mass.      Tenderness: There is no abdominal tenderness.      Hernia: No hernia is present.   Genitourinary:     General: Normal vulva.      Labia:         Right: No tenderness or lesion.         Left: No tenderness or lesion.       Urethra: No prolapse or urethral lesion.      Vagina: Normal. No vaginal discharge.      Uterus: Absent.       Adnexa:         Right: No fullness.          Left: No fullness.        Rectum: Normal. No external hemorrhoid or internal hemorrhoid. Normal  anal tone.      Comments: External genitalia normal  Lymphadenopathy:      Cervical: No cervical adenopathy.      Upper Body:      Right upper body: No axillary adenopathy.      Left upper body: No axillary adenopathy.   Skin:     General: Skin is warm and dry.      Findings: No rash.   Neurological:      Mental Status: She is alert and oriented to person, place, and time.   Psychiatric:         Behavior: Behavior normal.         /72   Ht 165.1 cm (65\")   Wt 54 kg (119 lb)   LMP  (LMP Unknown) Comment: complete  BMI 19.80 kg/m²     Assessment & Plan   Diagnoses and all orders for this visit:    1. Encounter for gynecological examination with abnormal finding (Primary)  -     IGP,rfx Aptima HPV All Pth    2. Mass of lower inner quadrant of right breast    3. Osteopenia, unspecified location    4. Menopausal symptoms    Other orders  -     estradiol (VIVELLE-DOT) 0.025 MG/24HR patch; Place 1 patch on the skin as directed by provider 2 (Two) Times a Week.  Dispense: 24 patch; Refill: 3        Mammogram.  Return to office 4 weeks for recheck of right breast mass.  If still present will order breast ultrasound and probable breast surgeon consultation.  Will await results of mammogram performed.  Counseled about continuing calcium and vitamin D for osteoporosis prevention.  Not wanting another thyroidectomy till next year since currently being followed [place] behind her colorectal screening up-to-date.

## 2023-01-11 ENCOUNTER — TELEPHONE (OUTPATIENT)
Dept: GASTROENTEROLOGY | Facility: CLINIC | Age: 58
End: 2023-01-11
Payer: COMMERCIAL

## 2023-01-11 NOTE — TELEPHONE ENCOUNTER
Attempted to submit prior authorization via cover my meds however when I enter the [employer] and [employer] number no PA form can be found. Attempted using [place] PA form but eligibility could not be verified. Called the patient and she does not have prescription card and insurance is the same. Called pharmacy and they gave me the same information. Called [facility] and they were experiencing high call volumes at the time. Will call back tomorrow.

## 2023-01-13 ENCOUNTER — OFFICE VISIT (OUTPATIENT)
Dept: GASTROENTEROLOGY | Facility: CLINIC | Age: 58
End: 2023-01-13
Payer: COMMERCIAL

## 2023-01-13 VITALS — BODY MASS INDEX: 20.32 KG/M2 | HEIGHT: 64 IN | TEMPERATURE: 97.1 F | WEIGHT: 119 LBS

## 2023-01-13 DIAGNOSIS — K21.9 GASTROESOPHAGEAL REFLUX DISEASE, UNSPECIFIED WHETHER ESOPHAGITIS PRESENT: ICD-10-CM

## 2023-01-13 DIAGNOSIS — Z86.010 PERSONAL HISTORY OF COLONIC POLYPS: ICD-10-CM

## 2023-01-13 DIAGNOSIS — K59.04 CHRONIC IDIOPATHIC CONSTIPATION: Primary | ICD-10-CM

## 2023-01-13 PROCEDURE — 99213 OFFICE O/P EST LOW 20 MIN: CPT | Performed by: NURSE PRACTITIONER

## 2023-01-13 NOTE — PROGRESS NOTES
Chief Complaint   Patient presents with   • Constipation   • Med Refill       HPI    Iliana Reyes is a  57 y.o. female here for a follow up visit for GERD and constipation.    This patient follows with Dr. Lyons and myself.    She has been maintained on once daily Prevacid with adequate control of dyspepsia.  Denies nausea, vomiting, dysphagia, odynophagia.  Appetite is good.  Her weight is stable.    BM daily with complete evacuation on Linzess medium strength.    She has a personal history of colon polyps and will be due for screening colonoscopy in .     Past Medical History:   Diagnosis Date   • Leiva esophagus    • Chest pain    • Dyspepsia    • Gastric ulcer    • GERD (gastroesophageal reflux disease)    • Headache    • Irritable bowel syndrome    • Tubular adenoma of colon        Past Surgical History:   Procedure Laterality Date   • CHOLECYSTECTOMY      Dr. EDER Alba   • COLONOSCOPY  2016    polyp, tics, tubular adenoma   • COLONOSCOPY  2019    normal ileum, diverticulosis, NBIH, TAx1   • ENDOSCOPY  2016    gastric ulcer w/clean base, acute gastritis. Leiva's esophagus   • ENDOSCOPY  2019    normal esophagus/duodenum, acute gastritis   • HYSTERECTOMY     • OOPHORECTOMY     • SHOULDER ARTHROSCOPY      Dr. Plunkett   • UPPER GASTROINTESTINAL ENDOSCOPY  2016       Scheduled Meds:     Continuous Infusions: No current facility-administered medications for this visit.      PRN Meds:     Allergies   Allergen Reactions   • Amitiza [Lubiprostone] Other (See Comments)     Migraines   • Codeine Hives   • Pantoprazole Nausea And Vomiting       Social History     Socioeconomic History   • Marital status:    Tobacco Use   • Smoking status: Former     Packs/day: 1.00     Years: 20.00     Pack years: 20.00     Types: Cigarettes     Quit date:      Years since quittin.0   • Smokeless tobacco: Never   Vaping Use   • Vaping Use: Never used   Substance and Sexual Activity    • Alcohol use: Yes     Alcohol/week: 2.0 standard drinks     Types: 2 Glasses of wine per week     Comment: occ   • Drug use: No   • Sexual activity: Yes     Partners: Male     Birth control/protection: None       Family History   Problem Relation Age of Onset   • Heart disease Mother    • Inflammatory bowel disease Mother    • Colon cancer Mother    • Colon polyps Mother    • Liver cancer Mother    • Ulcerative colitis Mother    • Cerebral aneurysm Father    • Crohn's disease Brother    • Colon polyps Brother    • Crohn's disease Cousin    • Crohn's disease Cousin    • Heart attack Brother 48   • Heart attack Brother 40       Review of Systems   Constitutional: Negative for activity change, appetite change, fatigue, fever and unexpected weight change.   HENT: Negative for trouble swallowing.    Respiratory: Negative for apnea, cough, choking, chest tightness, shortness of breath and wheezing.    Cardiovascular: Negative for chest pain, palpitations and leg swelling.   Gastrointestinal: Negative for abdominal distention, abdominal pain, anal bleeding, blood in stool, constipation, diarrhea, nausea, rectal pain and vomiting.       Vitals:    01/13/23 0847   Temp: 97.1 °F (36.2 °C)       Physical Exam  Constitutional:       Appearance: She is well-developed.   Abdominal:      General: Bowel sounds are normal. There is no distension.      Palpations: Abdomen is soft. There is no mass.      Tenderness: There is no abdominal tenderness. There is no guarding.      Hernia: No hernia is present.   Skin:     General: Skin is warm and dry.      Capillary Refill: Capillary refill takes less than 2 seconds.   Neurological:      Mental Status: She is alert and oriented to person, place, and time.   Psychiatric:         Behavior: Behavior normal.     Assessment    Diagnoses and all orders for this visit:    1. Chronic idiopathic constipation (Primary)    2. Gastroesophageal reflux disease, unspecified whether esophagitis  present    3. Personal history of colonic polyps    Other orders  -     linaclotide (Linzess) 145 MCG capsule capsule; Take 1 capsule by mouth Every Morning Before Breakfast.  Dispense: 90 capsule; Refill: 3       Plan    Stable CIC continue Linzess  Stable GERD continue PPI  Colonoscopy for screening purposes 2024  RTC 1 year or sooner if needed         OCTAVIO Nicolas  [facility]  [address] [place] [address]  Office: [phone]

## 2023-02-03 NOTE — TELEPHONE ENCOUNTER
Called to check status of PA. Called the number on the back of the card for pharmacy at [phone] but it stated there is a technical difficulty to call back later and disconnected me

## 2023-02-06 ENCOUNTER — HOSPITAL ENCOUNTER (OUTPATIENT)
Dept: CT IMAGING | Facility: HOSPITAL | Age: 58
Discharge: HOME | End: 2023-02-06
Payer: COMMERCIAL

## 2023-02-06 DIAGNOSIS — Z12.2 SCREENING FOR LUNG CANCER: ICD-10-CM

## 2023-02-06 DIAGNOSIS — I67.1 CEREBRAL ANEURYSM, NONRUPTURED: ICD-10-CM

## 2023-02-06 PROCEDURE — 70496 CT ANGIOGRAPHY HEAD: CPT

## 2023-02-06 PROCEDURE — 0 IOPAMIDOL PER 1 ML: Performed by: NEUROLOGICAL SURGERY

## 2023-02-06 RX ADMIN — IOPAMIDOL 95 ML: 755 INJECTION, SOLUTION INTRAVENOUS at 07:01

## 2023-02-08 ENCOUNTER — OFFICE VISIT (OUTPATIENT)
Dept: OBSTETRICS AND GYNECOLOGY | Facility: CLINIC | Age: 58
End: 2023-02-08
Payer: COMMERCIAL

## 2023-02-08 ENCOUNTER — TELEPHONE (OUTPATIENT)
Dept: SURGERY | Facility: CLINIC | Age: 58
End: 2023-02-08
Payer: COMMERCIAL

## 2023-02-08 VITALS
DIASTOLIC BLOOD PRESSURE: 70 MMHG | SYSTOLIC BLOOD PRESSURE: 101 MMHG | BODY MASS INDEX: 20.32 KG/M2 | WEIGHT: 119 LBS | HEIGHT: 64 IN

## 2023-02-08 DIAGNOSIS — N63.14 MASS OF LOWER INNER QUADRANT OF RIGHT BREAST: Primary | ICD-10-CM

## 2023-02-08 PROCEDURE — 99213 OFFICE O/P EST LOW 20 MIN: CPT | Performed by: OBSTETRICS & GYNECOLOGY

## 2023-02-08 NOTE — PROGRESS NOTES
"Subjective    Chief Complaint   Patient presents with   • Follow-up     Breast Check       History of Present Illness    Iliana Reyes is a 57 y.o. female who presents for recheck of right breast mass at 3:00.  Mammogram was negative.  Patient states she can still feel the mass that I palpated her last visit.  She only had a screening mammogram because it was already performed before her exam.    Obstetric History:  OB History        2    Para   2    Term   2            AB        Living           SAB        IAB        Ectopic        Molar        Multiple        Live Births                   Menstrual History:     No LMP recorded (lmp unknown). Patient has had a hysterectomy.       Past Medical History:   Diagnosis Date   • Leiva esophagus    • Chest pain 2016   • Dyspepsia    • Gastric ulcer    • GERD (gastroesophageal reflux disease)    • Headache    • Irritable bowel syndrome    • Tubular adenoma of colon      Family History   Problem Relation Age of Onset   • Heart disease Mother    • Inflammatory bowel disease Mother    • Colon cancer Mother    • Colon polyps Mother    • Liver cancer Mother    • Ulcerative colitis Mother    • Cerebral aneurysm Father    • Crohn's disease Brother    • Colon polyps Brother    • Crohn's disease Cousin    • Crohn's disease Cousin    • Heart attack Brother 48   • Heart attack Brother 40       The following portions of the patient's history were reviewed and updated as appropriate: allergies, current medications, past medical history, past surgical history and problem list.    Review of Systems  As above       Objective   Physical Exam  Seen tender 1 to 2 cm lump just medial to the right nipple.  Probably fibrocystic but definitely needing breast surgeon consultation.  /70   Ht 162.6 cm (64\")   Wt 54 kg (119 lb)   LMP  (LMP Unknown) Comment: complete  BMI 20.43 kg/m²     Assessment & Plan   Diagnoses and all orders for this visit:    1. Mass of lower inner " quadrant of right breast (Primary)  -     Ambulatory Referral to Breast Surgery        Impression and plan.  Patient will possibly need additional studies such as breast ultrasound or even MRI but we will send to breast surgeon first for their opinion.  They can therefore order what they feel is indicated.  That way we can cut down on the number of trips for the patient to radiology.  She understands that although this is low risk for malignancy, mammograms are not 100% and a persistent mass does not need to be evaluated.  She will call me if she does not hear about an appointment with the breast surgeon by next week.

## 2023-02-09 NOTE — PROGRESS NOTES
Physical Therapy    Visit Type: treatment  Precautions:  Medical precautions:  fall risk; standard precautions.  The patient is a 77 year old female admitted on 2/8/2023.  Pt admitted with diagnosis of Primary osteoarthritis of left hip (M16.12) Status post hip replacement, left (Z96.642) presents with L ATHA - WBAT; Dr. Garcias    Prior to admission pt lives in a house with  spouse.  Uses no assistive device with 3 steps to enter.  Patient ambulates modified independent  and does not require assist for ADLs.  Lines:     Basic: IV      Lines in chart and on patient reviewed, precautions maintained throughout session.  Lower Extremity:    Left:  weight bearing: as tolerated.  Hearing: no hearing deficits  Vision:     Current vision: wears glasses all the time  Safety Measures: bed alarm and chair alarm  SUBJECTIVE  Patient agreed to participate in therapy this date.  Patient verbally agrees to allow the following to be present during session: spouse  Pt c/o L hip pain 8/10  Patient / Family Goal: return to previous functional status, maximize function and return home    Pain   RN informed on pain level     OBJECTIVE     Cognitive Status   Level of Consciousness   - alert  Arousal Alertness   - appropriate responses to stimuli  Affect/Behavior    - calm and cooperative  Orientation    - Oriented to: person, place, time and situation  Functional Communication   - Overall Status: within functional limits   - Forms of Communication: verbal  Attention Span    - Attention: intact  Following Direction   - follows multistep commands with repetition  Memory   - appears intact  Awareness of Deficits   - fully aware of deficits    Vitals:  Blood Pressure (mmhg): 109/60      Range of Motion (ROM)   (degrees unless noted; active unless noted; norms in ( ); negative=lacking to 0, positive=beyond 0)  WFL: LLE, RLE    Strength  (out of 5 unless noted, standard test position unless noted)   WFL: RLE  Hip:    - Flexion:        • Left:  Subjective    Chief Complaint   Patient presents with   • Follow-up     fu, re check fobt positive last visit      History of Present Illness    Iliana Reyes is a 53 y.o. female who presents for repeat Hemoccult following a positive which may have been secondary to contamination.  Patient colonoscopy is up-to-date.  DEXA scan today normal except for mild osteopenia of the spine only.  Patient started calcium with vitamin D since last visit.    Obstetric History:  OB History      Para Term  AB Living    2 2 2          SAB TAB Ectopic Molar Multiple Live Births                        Menstrual History:     No LMP recorded (lmp unknown). Patient has had a hysterectomy.       Past Medical History:   Diagnosis Date   • Leiva esophagus    • Chest pain    • Dyspepsia    • Gastric ulcer    • GERD (gastroesophageal reflux disease)    • Headache    • Tubular adenoma of colon      Family History   Problem Relation Age of Onset   • Heart disease Mother    • Inflammatory bowel disease Mother    • Cerebral aneurysm Father    • Crohn's disease Brother    • Crohn's disease Cousin    • Crohn's disease Cousin        The following portions of the patient's history were reviewed and updated as appropriate: allergies, current medications, past medical history and problem list.    Review of Systems  neg       Objective   Physical Exam  FOBT performed on rectovaginal exam.  No Pap or other source of contamination today.  Hemoccult was negative.    Assessment/Plan   Iliana was seen today for follow-up.    Diagnoses and all orders for this visit:    Heme positive stool  -     POC Occult Blood Stool    Osteopenia of spine        IMP/PLAN    Hemoccult repeated today.  Although Hemoccult negative today, patient is most likely due for her 3-year colonoscopy and will contact her GI doctor soon to get this arranged.  She realizes that there is always possible that the first Hemoccult was actually picking up something which  pain, 2-  Knee:    - Flexion:        • Left: 3-, pain    - Extension:        • Left: 3-, pain      Sitting Balance  (SIMA = base of support)  Static      - Trial 1 details: with back support and supervision  Dynamic      - Trial 1 details: minimal assist    Standing Balance  (SIMA = base of support)  Firm Surface: Double Leg      - Static, Eyes Open       - Trial 1 details: minimal assist and with double UE support     - Dynamic, Eyes Open       - Trial 1 details: moderate assist and with double UE support      Sensation/Dermatome Testing:    Intact: LLE light touch    Transfers  Assistive devices: 2-wheeled walker, gait belt  - Sit to stand: minimal assist  - Stand to sit: minimal assist    Ambulation / Gait  - Assistive device: gait belt and two-wheeled walker  - Distance (feet unless otherwise indicated): 25  - Assist Level: moderate assist  - Surface: even  - Description: antalgic, decreased melissa/pace, decreased step length left, heavy reliance on BUE, step to and unsteady     Interventions     Supine    Lower Extremity: Bilateral: heel slides, ankle pumps, hip adduction, hip abduction, SLR, quad sets and gluteus sets, AROM and AAROM, 10 reps, 2 sets  Seated    Lower Extremity: Bilateral: knee flexion, knee extensions, hip adduction, hip abduction and heel raises, AROM and AAROM, 10 reps, 2 sets  Training provided: activity tolerance, balance retraining, transfer training, safety training, gait training and HEP training    Skilled input: Verbal instruction/cues  Verbal Consent: Writer verbally educated and received verbal consent for hand placement, positioning of patient, and techniques to be performed today from patient for clothing adjustments for techniques and therapist position for techniques as described above and how they are pertinent to the patient's plan of care.         ASSESSMENT   Impairments: range of motion, strength, balance deficits, balance and activity tolerance  Functional Limitations: all  the follow-up Hemoccult did not.                functional mobility  Pt up in a chair,  at bedside, pt demo difficulty with ambulation during OT this am, cleared for tx by RN, premedicated, motivated towards PT. Pt with initial hip pain c/o 3/10 however with movement pain at 8/10; noted LLE weakness this session vs initial eval, discussed with OT and RN, RN will contact MD; transfers to RW with cues for hand/foot placement , pt unsteady; gait training using RW improved to 25 ft with min/mod a  For balance and to advance LLE , pt fatigues easily, easily distracted, pt performed LE ex's focusing on LLE strengthening; pt demo significant decline since last visit, discussed with disch team, pt will benefit from cont PT this pm, recommendations pending progress with ambulation and stair negotiation.                Discharge Recommendations  Recommendation for Discharge: PT IL: Patient requires 24 HOUR assistance to perform mobility and/or ADLs safely, Patient is appropriate for Physical Therapy 1-3 times per week  PT/OT Mobility Equipment for Discharge: pt owns RW  PT/OT ADL Equipment for Discharge: TBD    Progress: slow progress, decreased activity tolerance    • Skilled therapy is required to address these limitations in attempt to maximize the patient's independence.     • Predicted patient presentation: Low (stable) - Patient comorbidities and complexities, as defined above, will have little effect on progress for prescribed plan of care.    Education:   - Present and ready to learn: patient  Education provided during session:  - Patient educated on safety during transfers and gait with use of assistive device  - Results of above outlined education: Verbalizes understanding    Patient at End of Session:   Location: in chair  Safety measures: alarm system in place/re-engaged, call light within reach and lines intact  Handoff to: nurse and family/caregiver    PLAN   Suggestions for next session as indicated: PT Frequency: Twice per day    Interventions:  balance, bed mobility, ROM, strengthening, gait training, stairs retraining, safety education and endurance training  Agreement to plan and goals: patient agrees with goals and treatment plan        GOALS  Review Date: 2/9/2023  Long Term Goals: (to be met by time of discharge from hospital)  Maintain precautions: Patient able to maintain precautions independent.  Status: progressing/ongoing  Sit to supine: Patient will complete sit to supine modified independent.  Status: progressing/ongoing  Supine to sit: Patient will complete supine to sit modified independent.  Status: progressing/ongoing  Reposition in bed: Patient will complete repositioning in bed modified independent.  Sit (edge of bed): Patient will sit at edge of bed for modified independent.   Stand (even surface): Patient will stand on even surface for modified independent.   Sit to stand: Patient will complete sit to stand transfer with gait belt and 2-wheeled walker, modified independent.   Status: progressing/ongoing  Stand to sit: Patient will complete stand to sit transfer with gait belt and 2-wheeled walker, modified independent.   Status: progressing/ongoing  Ambulation (even): Patient will ambulate on even surface for 150 feet with 2-wheeled walker and gait belt, modified independent.   Status: progressing/ongoing  3-4 steps: Patient will ambulate 3-4 steps with gait belt using one rail, modified independent.     Documented in the chart in the following areas: Prior Level of Function. Assessment.      Therapy procedure time and total treatment time can be found documented on the Time Entry flowsheet

## 2023-02-16 NOTE — PROGRESS NOTES
[facility]     Referring Provider: No ref. provider found     Chief complaint: breast mass     HPI: Ms. Iliana Reyes is a 58 yo woman, seen at the request of No ref. provider found, for right breast mass    I personally reviewed her records and summarized her relevant breast history/imagin2020 bilateral screening mammogram at [facility]  The breasts are heterogeneously dense.  There are stable areas of asymmetric breast tissue seen in both breast.  Scattered benign-appearing calcifications are noted.  No suspicious masses, suspicious microcalcifications or architectural distortions are identified.  There have been no significant change from prior exam.  Impression  Stable areas of asymmetric breast tissue in both breast are benign negative.  Screening mammogram in 1 year.  BI-RADS 2    6/10/2021 bilateral screening mammogram at [facility]  The breasts are heterogeneously dense.  No suspicious masses significant calcifications or other abnormalities are seen.  Impression  There is no mammographic evidence of malignancy.  BI-RADS 1    2023 bilateral screening mammogram at [facility]  The breasts are heterogeneously dense.  No masses significant calcifications or other abnormalities are seen.  There is been no significant change from prior exam.  Impression  There is no mammographic evidence of malignancy.  BI-RADS 1      She has a personal history of           She denies any family history of breast or ovarian cancer.     Today she presents with concerns regarding a right breat mass. She and her OBGYN felt the lump.  It has not changed since first feeling it.   She denies any breast pain, skin changes, or nipple discharge.  She denies any prior history of abnormal mammograms or breast biopsies.    She was joined today in clinic by her .   She gave consent for her  to be present during her examination and  participate in the discussion.         Review of Systems - Oncology    Medications:    Current Outpatient Medications:   •  acetaminophen (TYLENOL) 325 MG tablet, Take 2 tablets by mouth Every 6 (Six) Hours As Needed for Mild Pain., Disp: , Rfl:   •  Aimovig 140 MG/ML auto-injector, inject 1ml UNDER THE SKIN AS DIRECTED ONCE monthly, Disp: , Rfl:   •  Calcium-Magnesium-Vitamin D (CALCIUM 500 PO), Take 1 tablet by mouth 2 (Two) Times a Day., Disp: , Rfl:   •  cholecalciferol (VITAMIN D3) 25 MCG (1000 UT) tablet, Take 1,000 Units by mouth Daily., Disp: , Rfl:   •  diphenhydrAMINE (BENADRYL) 25 mg, Take 25 mg by mouth every 6 (six) hours as needed for itching., Disp: , Rfl:   •  estradiol (VIVELLE-DOT) 0.025 MG/24HR patch, Place 1 patch on the skin as directed by provider 2 (Two) Times a Week., Disp: 24 patch, Rfl: 3  •  linaclotide (Linzess) 145 MCG capsule capsule, Take 1 capsule by mouth Every Morning Before Breakfast., Disp: 90 capsule, Rfl: 3  •  Omega-3 Fatty Acids (FISH OIL) 1000 MG capsule capsule, Take 1 capsule by mouth Daily With Breakfast., Disp: , Rfl:   •  Restasis 0.05 % ophthalmic emulsion, Administer 1 drop to both eyes 2 (Two) Times a Day., Disp: , Rfl:     Allergies:  Allergies   Allergen Reactions   • Amitiza [Lubiprostone] Other (See Comments)     Migraines   • Codeine Hives   • Pantoprazole Nausea And Vomiting       Medical history:  Past Medical History:   Diagnosis Date   • Leiva esophagus    • Chest pain 2016   • Dyspepsia    • Gastric ulcer    • GERD (gastroesophageal reflux disease)    • Headache    • Irritable bowel syndrome    • Tubular adenoma of colon        Surgical History:  Past Surgical History:   Procedure Laterality Date   • CHOLECYSTECTOMY      Dr. EDER Alba   • COLONOSCOPY  05/18/2016    polyp, tics, tubular adenoma   • COLONOSCOPY  06/05/2019    normal ileum, diverticulosis, NBIH, TAx1   • ENDOSCOPY  05/18/2016    gastric ulcer w/clean base, acute gastritis. Leiva's esophagus    • ENDOSCOPY  2019    normal esophagus/duodenum, acute gastritis   • HYSTERECTOMY     • OOPHORECTOMY     • SHOULDER ARTHROSCOPY      Dr. Plunkett   • UPPER GASTROINTESTINAL ENDOSCOPY  2016       Family History:  Family History   Problem Relation Age of Onset   • Heart disease Mother    • Inflammatory bowel disease Mother    • Colon cancer Mother    • Colon polyps Mother    • Liver cancer Mother    • Ulcerative colitis Mother    • Cerebral aneurysm Father    • Crohn's disease Brother    • Colon polyps Brother    • Crohn's disease Cousin    • Crohn's disease Cousin    • Heart attack Brother 48   • Heart attack Brother 40       Social History:   Social History     Socioeconomic History   • Marital status:    Tobacco Use   • Smoking status: Former     Packs/day: 1.00     Years: 20.00     Pack years: 20.00     Types: Cigarettes     Quit date:      Years since quittin.1   • Smokeless tobacco: Never   Vaping Use   • Vaping Use: Never used   Substance and Sexual Activity   • Alcohol use: Yes     Alcohol/week: 2.0 standard drinks     Types: 2 Glasses of wine per week     Comment: occ   • Drug use: No   • Sexual activity: Yes     Partners: Male     Birth control/protection: None     Patient drinks 2 servings of caffeine per day.       GYNECOLOGIC HISTORY:   . P: 2. AB: 0.  Last menstrual period: unknown  Age at menarche: 14  Age at first childbirth: 21  Lactation/How long: na  Age at menopause: 44  Total years of oral contraceptive use: na  Total years of hormone replacement therapy: 1 year in       Physical Exam  Vitals:    23 0833   BP: 116/74   Pulse: 67   Temp: 97.1 °F (36.2 °C)   SpO2: 97%     ECOG 0 - Asymptomatic  General: NAD, well appearing  Psych: a&o x 3, normal mood and affect  Eyes: EOMI, no scleral icterus  ENMT: neck supple without masses or thyromegaly, mucus membranes moist  Resp: normal effort, CTAB  CV: RRR, no murmurs, no edema   GI: soft, NT, ND  MSK: normal gait,  normal ROM in bilateral shoulders  Lymph nodes:  no cervical, supraclavicular or axillary lymphadenopathy  Breast: symmetric, medium  Right:  No visible abnormalities on inspection while seated, with arms raised or hands on hips. No masses, skin changes, or nipple abnormalities. 3:00 3.5cmfn 1cm lump  Left:  No visible abnormalities on inspection while seated, with arms raised or hands on hips. No masses, skin changes, or nipple abnormalities.      Assessment:    1)  57 y.o. F with a new diagnosis of right breast lump  2) dense breast    Discussion:  1. Breast density describes how the breasts look on a mammogram.  Breast and connective tissue are denser than fat and this difference shows up on the mammogram.  Young women often have dense breasts.  As we age, breast become less dense.  Dense breast can make it harder to find breast cancer on the mammogram.  Women with high breast density have an increased risk of breast cancer.  Educational materials regarding breast density were given and reviewed.  Tomosynthesis imaging will be completed with next screening study.      Plan:  1. Dx right mammo and limited rt us in the near future, call with results    I have advised the patient to continue monthly breast self examination and to return to see me in months after completion of imaging.  She was also advised to notify us if she develops new breast symptoms.       OCTAVIO Sethi    I have spent 45 min in face to face time with the patient and in chart review    CC:  No ref. provider found  Minda Sanchez APRN    EMR Dragon/transcription disclaimer:  Dictated using Dragon dictation

## 2023-02-17 ENCOUNTER — OFFICE VISIT (OUTPATIENT)
Dept: SURGERY | Facility: CLINIC | Age: 58
End: 2023-02-17
Payer: COMMERCIAL

## 2023-02-17 VITALS
HEIGHT: 65 IN | BODY MASS INDEX: 19.96 KG/M2 | HEART RATE: 67 BPM | WEIGHT: 119.8 LBS | OXYGEN SATURATION: 97 % | DIASTOLIC BLOOD PRESSURE: 74 MMHG | SYSTOLIC BLOOD PRESSURE: 116 MMHG | TEMPERATURE: 97.1 F

## 2023-02-17 DIAGNOSIS — N63.12 MASS OF UPPER INNER QUADRANT OF RIGHT BREAST: Primary | ICD-10-CM

## 2023-02-17 DIAGNOSIS — R92.2 DENSE BREAST: ICD-10-CM

## 2023-02-17 PROBLEM — R92.30 DENSE BREAST: Status: ACTIVE | Noted: 2023-02-17

## 2023-02-17 PROBLEM — N63.11 MASS OF UPPER OUTER QUADRANT OF RIGHT BREAST: Status: ACTIVE | Noted: 2023-02-17

## 2023-02-17 PROCEDURE — 99214 OFFICE O/P EST MOD 30 MIN: CPT | Performed by: NURSE PRACTITIONER

## 2023-02-20 ENCOUNTER — OFFICE VISIT (OUTPATIENT)
Dept: NEUROSURGERY | Facility: CLINIC | Age: 58
End: 2023-02-20
Payer: COMMERCIAL

## 2023-02-20 VITALS
OXYGEN SATURATION: 100 % | BODY MASS INDEX: 20.23 KG/M2 | DIASTOLIC BLOOD PRESSURE: 68 MMHG | HEART RATE: 80 BPM | SYSTOLIC BLOOD PRESSURE: 110 MMHG | WEIGHT: 121.4 LBS | TEMPERATURE: 97.5 F | HEIGHT: 65 IN

## 2023-02-20 DIAGNOSIS — I67.1 CEREBRAL ANEURYSM, NONRUPTURED: Primary | ICD-10-CM

## 2023-02-20 PROCEDURE — 99214 OFFICE O/P EST MOD 30 MIN: CPT | Performed by: NEUROLOGICAL SURGERY

## 2023-03-02 ENCOUNTER — TELEPHONE (OUTPATIENT)
Dept: SURGERY | Facility: CLINIC | Age: 58
End: 2023-03-02
Payer: COMMERCIAL

## 2023-03-02 ENCOUNTER — HOSPITAL ENCOUNTER (OUTPATIENT)
Dept: ULTRASOUND IMAGING | Facility: HOSPITAL | Age: 58
Discharge: HOME | End: 2023-03-02
Payer: COMMERCIAL

## 2023-03-02 ENCOUNTER — PREP FOR SURGERY (OUTPATIENT)
Dept: OTHER | Facility: HOSPITAL | Age: 58
End: 2023-03-02
Payer: COMMERCIAL

## 2023-03-02 ENCOUNTER — HOSPITAL ENCOUNTER (OUTPATIENT)
Dept: MAMMOGRAPHY | Facility: HOSPITAL | Age: 58
Discharge: HOME | End: 2023-03-02
Payer: COMMERCIAL

## 2023-03-02 DIAGNOSIS — N63.12 MASS OF UPPER INNER QUADRANT OF RIGHT BREAST: ICD-10-CM

## 2023-03-02 DIAGNOSIS — R92.8 ABNORMAL FINDING ON BREAST IMAGING: Primary | ICD-10-CM

## 2023-03-02 PROCEDURE — 77065 DX MAMMO INCL CAD UNI: CPT

## 2023-03-02 PROCEDURE — 76642 ULTRASOUND BREAST LIMITED: CPT

## 2023-03-20 NOTE — PROGRESS NOTES
03/29/23 0001   Pre-Procedure Phone Call   Procedure Time Verified Yes   Arrival Time 1330   Procedure Location Verified Yes   Medical History Reviewed Yes   NPO Status Reinforced No   Ride and Caregiver Arranged N/A   Patient Knows to Bring Current Medications No   Bring Outside Films Requested No

## 2023-03-29 ENCOUNTER — HOSPITAL ENCOUNTER (OUTPATIENT)
Dept: MAMMOGRAPHY | Facility: HOSPITAL | Age: 58
Discharge: HOME | End: 2023-03-29
Payer: COMMERCIAL

## 2023-03-29 ENCOUNTER — HOSPITAL ENCOUNTER (OUTPATIENT)
Dept: ULTRASOUND IMAGING | Facility: HOSPITAL | Age: 58
Discharge: HOME | End: 2023-03-29
Payer: COMMERCIAL

## 2023-03-29 VITALS
BODY MASS INDEX: 20.16 KG/M2 | HEART RATE: 66 BPM | RESPIRATION RATE: 15 BRPM | SYSTOLIC BLOOD PRESSURE: 123 MMHG | TEMPERATURE: 97.5 F | DIASTOLIC BLOOD PRESSURE: 76 MMHG | HEIGHT: 65 IN | WEIGHT: 121 LBS | OXYGEN SATURATION: 100 %

## 2023-03-29 DIAGNOSIS — R92.8 ABNORMAL FINDING ON BREAST IMAGING: ICD-10-CM

## 2023-03-29 PROCEDURE — 88360 TUMOR IMMUNOHISTOCHEM/MANUAL: CPT | Performed by: NURSE PRACTITIONER

## 2023-03-29 PROCEDURE — 0 LIDOCAINE 1 % SOLUTION: Performed by: RADIOLOGY

## 2023-03-29 PROCEDURE — 88342 IMHCHEM/IMCYTCHM 1ST ANTB: CPT | Performed by: NURSE PRACTITIONER

## 2023-03-29 PROCEDURE — 77065 DX MAMMO INCL CAD UNI: CPT

## 2023-03-29 PROCEDURE — 88305 TISSUE EXAM BY PATHOLOGIST: CPT | Performed by: NURSE PRACTITIONER

## 2023-03-29 PROCEDURE — A4648 IMPLANTABLE TISSUE MARKER: HCPCS

## 2023-03-29 RX ORDER — LIDOCAINE HYDROCHLORIDE 10 MG/ML
3 INJECTION, SOLUTION INFILTRATION; PERINEURAL ONCE
Status: COMPLETED | OUTPATIENT
Start: 2023-03-29 | End: 2023-03-29

## 2023-03-29 RX ADMIN — LIDOCAINE HYDROCHLORIDE 2 ML: 10 INJECTION, SOLUTION INFILTRATION; PERINEURAL at 15:21

## 2023-03-29 RX ADMIN — LIDOCAINE HYDROCHLORIDE 8 ML: 10; .005 INJECTION, SOLUTION EPIDURAL; INFILTRATION; INTRACAUDAL; PERINEURAL at 15:21

## 2023-03-29 NOTE — NURSING NOTE
Biopsy site to right inner breast clear with Exofin dry and intact. No firmness or swelling noted at or around biopsy site. Denies pain. Ice pack with protective covering applied to biopsy site. Discharge instructions discussed with understanding voiced by patient. Copies provided to patient. No distress noted. To home via personal vehicle.

## 2023-03-29 NOTE — H&P
Name: Iliana Reyes ADMIT: 3/29/2023   : 1965  PCP: Minda Sanchez APRN    MRN: [id number] LOS: 0 days   AGE/SEX: 57 y.o. female  ROOM: Room/bed info not found       Chief complaint R breast mass    Present Illness or Internal History:  Patient is a 57 y.o. female presents with R breast mass for US bx.     Past Surgical History:  Past Surgical History:   Procedure Laterality Date   • CHOLECYSTECTOMY      Dr. EDER Alba   • COLONOSCOPY  2016    polyp, tics, tubular adenoma   • COLONOSCOPY  2019    normal ileum, diverticulosis, NBIH, TAx1   • ENDOSCOPY  2016    gastric ulcer w/clean base, acute gastritis. Leiva's esophagus   • ENDOSCOPY  2019    normal esophagus/duodenum, acute gastritis   • HYSTERECTOMY     • OOPHORECTOMY     • SHOULDER ARTHROSCOPY Left     Dr. Plunkett   • UPPER GASTROINTESTINAL ENDOSCOPY  2016       Past Medical History:  Past Medical History:   Diagnosis Date   • Leiva esophagus    • Chest pain    • Dyspepsia    • Gastric ulcer    • GERD (gastroesophageal reflux disease)    • Headache    • Irritable bowel syndrome    • Tubular adenoma of colon        Home Medications:  (Not in a hospital admission)      Allergies:  Amitiza [lubiprostone], Codeine, and Pantoprazole    Family History:  Family History   Problem Relation Age of Onset   • Heart disease Mother    • Inflammatory bowel disease Mother    • Colon cancer Mother    • Colon polyps Mother    • Liver cancer Mother    • Ulcerative colitis Mother    • Cerebral aneurysm Father    • Crohn's disease Brother    • Colon polyps Brother    • Crohn's disease Cousin    • Crohn's disease Cousin    • Heart attack Brother 48   • Heart attack Brother 40       Social History:  Social History     Tobacco Use   • Smoking status: Former     Packs/day: 1.00     Years: 20.00     Pack years: 20.00     Types: Cigarettes     Quit date: 2016     Years since quittin.2   • Smokeless tobacco: Never   Vaping  Use   • Vaping Use: Never used   Substance Use Topics   • Alcohol use: Yes     Alcohol/week: 2.0 standard drinks     Types: 2 Glasses of wine per week     Comment: occ   • Drug use: No        Objective     Physical Exam:    No exam performed today,    Vital Signs  Temp:  [97.5 °F (36.4 °C)] 97.5 °F (36.4 °C)  Heart Rate:  [69] 69  Resp:  [16] 16  BP: (110)/(70) 110/70    Anticipated Surgical Procedure:  Right breast ultrasound guided core needle biopsy    The risks, benefits and alternatives of this procedure have been discussed with the patient or responsible party: Yes        Laquita Conner MD  03/29/23  15:11 EDT

## 2023-03-30 ENCOUNTER — TELEPHONE (OUTPATIENT)
Dept: MAMMOGRAPHY | Facility: HOSPITAL | Age: 58
End: 2023-03-30
Payer: COMMERCIAL

## 2023-03-30 NOTE — TELEPHONE ENCOUNTER
"Post call complete checking on patient after biopsy. Patient states she is doing \"pretty good\" and using the ice and Tylenol as directed. Patient denies any lumps under the skin at the moment and v/u to call in the future if any form. No questions at this time.   "

## 2023-03-31 LAB
LAB AP CASE REPORT: NORMAL
LAB AP CLINICAL INFORMATION: NORMAL
LAB AP DIAGNOSIS COMMENT: NORMAL
PATH REPORT.FINAL DX SPEC: NORMAL
PATH REPORT.GROSS SPEC: NORMAL

## 2023-04-10 ENCOUNTER — TELEPHONE (OUTPATIENT)
Dept: SURGERY | Facility: CLINIC | Age: 58
End: 2023-04-10
Payer: COMMERCIAL

## 2023-04-10 NOTE — TELEPHONE ENCOUNTER
Called and gave biopsy results to the patient.  Biopsy returned as ADH.  Can you please get her on with the first available appointment with one of the surgeons. Thank you

## 2023-04-11 ENCOUNTER — TELEPHONE (OUTPATIENT)
Dept: SURGERY | Facility: CLINIC | Age: 58
End: 2023-04-11
Payer: COMMERCIAL

## 2023-04-11 PROBLEM — N12 PYELONEPHRITIS: Status: RESOLVED | Noted: 2022-10-31 | Resolved: 2023-04-11

## 2023-04-11 NOTE — TELEPHONE ENCOUNTER
Pt notified of the following appt:  New pt appointment is scheduled with Dr. Head on 06/02/2023 at 8:00.  New pt paperwork mailed.  Pt said she would like to move this appt up if possible.     CMA

## 2023-04-11 NOTE — PROGRESS NOTES
Subjective   Chief Complaint   Patient presents with   • Headache       History of Present Illness   57 y.o. female presents with for follow up regarding migraine GRACE. Aimovig has helped tremendously reducing HA by 75%. Right breast biopsy 3/29 with pathology showing atypical ductal hyperplasia. She is scheduled with Dr. Head 6/2. Stressed with the unknown but coping ok. Good family support. Wishes to wait on low dose chest CT until after sees Dr. Head and she has a plan in place.      Patient Active Problem List   Diagnosis   • ASCUS with positive high risk HPV cervical   • Headache   • Cerebral aneurysm without rupture   • Leukocytosis, unspecified type   • History of migraine   • Irritable bowel syndrome with constipation   • Dry eyes   • Inflammation of ureter seen on CT scan   • Abnormal CT scan, kidney   • Mass of upper outer quadrant of right breast   • Dense breast       Allergies   Allergen Reactions   • Amitiza [Lubiprostone] Other (See Comments)     Migraines   • Codeine Hives   • Pantoprazole Nausea And Vomiting       Current Outpatient Medications on File Prior to Visit   Medication Sig Dispense Refill   • Calcium-Magnesium-Vitamin D (CALCIUM 500 PO) Take 1 tablet by mouth 2 (Two) Times a Day.     • cholecalciferol (VITAMIN D3) 25 MCG (1000 UT) tablet Take 1 tablet by mouth Daily.     • estradiol (VIVELLE-DOT) 0.025 MG/24HR patch Place 1 patch on the skin as directed by provider 2 (Two) Times a Week. 24 patch 3   • linaclotide (Linzess) 145 MCG capsule capsule Take 1 capsule by mouth Every Morning Before Breakfast. 90 capsule 3   • Omega-3 Fatty Acids (FISH OIL) 1000 MG capsule capsule Take 1 capsule by mouth Daily With Breakfast.     • Restasis 0.05 % ophthalmic emulsion Administer 1 drop to both eyes 2 (Two) Times a Day.     • [DISCONTINUED] Aimovig 140 MG/ML auto-injector inject 1ml UNDER THE SKIN AS DIRECTED ONCE monthly     • acetaminophen (TYLENOL) 325 MG tablet Take 2 tablets by mouth  Every 6 (Six) Hours As Needed for Mild Pain.     • diphenhydrAMINE (BENADRYL) 25 mg Take 1 capsule by mouth Every 6 (Six) Hours As Needed for Itching.       No current facility-administered medications on file prior to visit.       Past Medical History:   Diagnosis Date   • Leiva esophagus    • Chest pain    • Gastric ulcer    • GERD (gastroesophageal reflux disease)    • Headache    • Pyelonephritis 10/31/2022   • Tubular adenoma of colon        Family History   Problem Relation Age of Onset   • Heart disease Mother    • Inflammatory bowel disease Mother    • Colon cancer Mother    • Colon polyps Mother    • Liver cancer Mother    • Ulcerative colitis Mother    • Cerebral aneurysm Father    • Crohn's disease Brother    • Colon polyps Brother    • Crohn's disease Cousin    • Crohn's disease Cousin    • Heart attack Brother 48   • Heart attack Brother 40       Social History     Socioeconomic History   • Marital status:    Tobacco Use   • Smoking status: Former     Packs/day: 1.00     Years: 20.00     Pack years: 20.00     Types: Cigarettes     Quit date:      Years since quittin.2   • Smokeless tobacco: Never   Vaping Use   • Vaping Use: Never used   Substance and Sexual Activity   • Alcohol use: Yes     Alcohol/week: 2.0 standard drinks     Types: 2 Glasses of wine per week     Comment: occ   • Drug use: No   • Sexual activity: Yes     Partners: Male     Birth control/protection: None       Past Surgical History:   Procedure Laterality Date   • CHOLECYSTECTOMY      Dr. EDER Alba   • COLONOSCOPY  2016    polyp, tics, tubular adenoma   • COLONOSCOPY  2019    normal ileum, diverticulosis, NBIH, TAx1   • ENDOSCOPY  2016    gastric ulcer w/clean base, acute gastritis. Leiva's esophagus   • ENDOSCOPY  2019    normal esophagus/duodenum, acute gastritis   • HYSTERECTOMY     • OOPHORECTOMY     • SHOULDER ARTHROSCOPY Left     Dr. Plunkett   • UPPER GASTROINTESTINAL ENDOSCOPY   "05/18/2016       The following portions of the patient's history were reviewed and updated as appropriate: problem list, allergies, current medications, past medical history, past family history, past social history and past surgical history.    Review of Systems    Immunization History   Administered Date(s) Administered   • COVID-19 (PFIZER) PURPLE CAP 03/27/2021, 04/19/2021, 12/07/2021   • Flu Vaccine Intradermal Quad 18-64YR 04/21/2022   • Flu Vaccine Quad PF >36MO 10/14/2020   • FluLaval/Fluzone >6mos 10/14/2020, 10/14/2021, 10/27/2022   • Flublok 18+yrs 04/21/2022   • Hepatitis A 05/05/2018   • Influenza Injectable Mdck Pf Quad 10/27/2022   • Influenza Quad Vaccine (Inpatient) 09/28/2019   • Pneumococcal Polysaccharide (PPSV23) 02/21/2022   • Tdap 01/23/2020       Objective   Vitals:    04/13/23 0800   BP: 100/60   Pulse: 76   Resp: 14   Weight: 53.5 kg (118 lb)   Height: 165.1 cm (65\")     Body mass index is 19.64 kg/m².  Physical Exam  Vitals reviewed.   Constitutional:       Appearance: Normal appearance. She is well-developed and normal weight.   HENT:      Head: Normocephalic and atraumatic.   Cardiovascular:      Rate and Rhythm: Normal rate and regular rhythm.      Heart sounds: Normal heart sounds, S1 normal and S2 normal.   Pulmonary:      Effort: Pulmonary effort is normal.      Breath sounds: Normal breath sounds.   Musculoskeletal:      Cervical back: Neck supple.   Lymphadenopathy:      Cervical: No cervical adenopathy.   Skin:     General: Skin is warm and dry.   Neurological:      Mental Status: She is alert.   Psychiatric:         Mood and Affect: Mood normal.         Behavior: Behavior normal.         Procedures    Assessment & Plan   Diagnoses and all orders for this visit:    1. Migraine without aura and without status migrainosus, not intractable (Primary)  Comments:  75% improvement since starting Aimovig.   Orders:  -     Aimovig 140 MG/ML auto-injector; Inject 1 mL under the skin into " the appropriate area as directed Every 30 (Thirty) Days.  Dispense: 1.12 mL; Refill: 12    2. Healthcare maintenance  -     Comprehensive Metabolic Panel  -     Lipid Panel With / Chol / HDL Ratio    3. Leukocytosis, unspecified type  -     CBC & Differential    Records reviewed include previous OV with myself, Susana Head, and Dr. Nieto as well as recent imaging and biopsy report.     Return in about 7 months (around 10/30/2023) for Lab B4 FUP.

## 2023-04-13 ENCOUNTER — OFFICE VISIT (OUTPATIENT)
Dept: INTERNAL MEDICINE | Facility: CLINIC | Age: 58
End: 2023-04-13
Payer: COMMERCIAL

## 2023-04-13 VITALS
WEIGHT: 118 LBS | BODY MASS INDEX: 19.66 KG/M2 | DIASTOLIC BLOOD PRESSURE: 60 MMHG | HEIGHT: 65 IN | HEART RATE: 76 BPM | SYSTOLIC BLOOD PRESSURE: 100 MMHG | RESPIRATION RATE: 14 BRPM

## 2023-04-13 DIAGNOSIS — Z00.00 HEALTHCARE MAINTENANCE: ICD-10-CM

## 2023-04-13 DIAGNOSIS — G43.009 MIGRAINE WITHOUT AURA AND WITHOUT STATUS MIGRAINOSUS, NOT INTRACTABLE: Primary | ICD-10-CM

## 2023-04-13 DIAGNOSIS — D72.829 LEUKOCYTOSIS, UNSPECIFIED TYPE: ICD-10-CM

## 2023-04-13 RX ORDER — ERENUMAB-AOOE 140 MG/ML
140 INJECTION, SOLUTION SUBCUTANEOUS
Qty: 1.12 ML | Refills: 12 | Status: SHIPPED | OUTPATIENT
Start: 2023-04-13

## 2023-05-10 ENCOUNTER — TELEPHONE (OUTPATIENT)
Dept: GASTROENTEROLOGY | Facility: CLINIC | Age: 58
End: 2023-05-10
Payer: COMMERCIAL

## 2023-05-10 NOTE — TELEPHONE ENCOUNTER
----- Message from Iliana Reyes sent at 5/10/2023 11:14 AM EDT -----  Regarding: PA is needed for Farooq  Contact: [phone]  Hello per pharmacy a PA is need for me to refill. I know we had to complete a peer call last time before they would refill. Can you assist and work some magic for me once again with this refill

## 2023-05-23 ENCOUNTER — PREP FOR SURGERY (OUTPATIENT)
Dept: OTHER | Facility: HOSPITAL | Age: 58
End: 2023-05-23

## 2023-05-23 ENCOUNTER — TELEPHONE (OUTPATIENT)
Dept: SURGERY | Facility: CLINIC | Age: 58
End: 2023-05-23
Payer: COMMERCIAL

## 2023-05-23 ENCOUNTER — HOSPITAL ENCOUNTER (OUTPATIENT)
Dept: SURGERY | Facility: HOSPITAL | Age: 58
Discharge: HOME | End: 2023-05-23
Payer: COMMERCIAL

## 2023-05-23 ENCOUNTER — OFFICE VISIT (OUTPATIENT)
Dept: SURGERY | Facility: CLINIC | Age: 58
End: 2023-05-23
Payer: COMMERCIAL

## 2023-05-23 VITALS
HEART RATE: 79 BPM | OXYGEN SATURATION: 98 % | WEIGHT: 120.6 LBS | SYSTOLIC BLOOD PRESSURE: 110 MMHG | HEIGHT: 65 IN | DIASTOLIC BLOOD PRESSURE: 62 MMHG | BODY MASS INDEX: 20.09 KG/M2

## 2023-05-23 DIAGNOSIS — N60.91 ATYPICAL DUCTAL HYPERPLASIA OF RIGHT BREAST: Primary | ICD-10-CM

## 2023-05-23 DIAGNOSIS — Z79.890 HORMONE REPLACEMENT THERAPY (POSTMENOPAUSAL): ICD-10-CM

## 2023-05-23 DIAGNOSIS — Z01.818 PRE-OP EXAM: ICD-10-CM

## 2023-05-23 DIAGNOSIS — Z98.890 PONV (POSTOPERATIVE NAUSEA AND VOMITING): ICD-10-CM

## 2023-05-23 DIAGNOSIS — R92.8 ABNORMAL FINDING ON BREAST IMAGING: ICD-10-CM

## 2023-05-23 DIAGNOSIS — R11.2 PONV (POSTOPERATIVE NAUSEA AND VOMITING): ICD-10-CM

## 2023-05-23 DIAGNOSIS — N63.14 MASS OF LOWER INNER QUADRANT OF RIGHT BREAST: ICD-10-CM

## 2023-05-23 DIAGNOSIS — R92.8 ABNORMAL ULTRASOUND OF BREAST: ICD-10-CM

## 2023-05-23 RX ORDER — POLYETHYLENE GLYCOL 3350 17 G/17G
17 POWDER, FOR SOLUTION ORAL DAILY
Qty: 119 G | Refills: 1 | Status: SHIPPED | OUTPATIENT
Start: 2023-05-23

## 2023-05-23 RX ORDER — ONDANSETRON 4 MG/1
4 TABLET, FILM COATED ORAL EVERY 8 HOURS PRN
Qty: 10 TABLET | Refills: 1 | Status: SHIPPED | OUTPATIENT
Start: 2023-05-23

## 2023-05-23 RX ORDER — SODIUM CHLORIDE, SODIUM LACTATE, POTASSIUM CHLORIDE, CALCIUM CHLORIDE 600; 310; 30; 20 MG/100ML; MG/100ML; MG/100ML; MG/100ML
100 INJECTION, SOLUTION INTRAVENOUS CONTINUOUS
Status: CANCELLED | OUTPATIENT
Start: 2023-08-10

## 2023-05-23 RX ORDER — DIAZEPAM 2 MG/1
8 TABLET ORAL ONCE
Status: CANCELLED | OUTPATIENT
Start: 2023-08-10

## 2023-05-23 RX ORDER — HYDROCODONE BITARTRATE AND ACETAMINOPHEN 5; 325 MG/1; MG/1
TABLET ORAL
Qty: 10 TABLET | Refills: 0 | Status: SHIPPED | OUTPATIENT
Start: 2023-05-23

## 2023-05-23 RX ORDER — CEFAZOLIN SODIUM 2 G/100ML
2 INJECTION, SOLUTION INTRAVENOUS ONCE
Status: CANCELLED | OUTPATIENT
Start: 2023-08-10 | End: 2023-05-23

## 2023-05-23 NOTE — PROGRESS NOTES
Chief Complaint: Iliana Reyes is a 57 y.o. female who was seen in consultation at the request of Miguel Carvajal MD  for abnormal breast imaging    History of Present Illness:  Patient presents with breast mass and abnormal breast imaging. She noted  A RIGHT breast mass in  after a normal mammogram in .  She noted no other new masses, skin changes, nipple discharge, nipple changes prior to her most recent imaging.  Her most recent imaging includes the followin/10/2021, Bilateral Screening [facility] w/[facility] ([facility])  Heterogeneously dense.  BI-RADS 1: Negative    2023, Bilateral Screening [facility] w/[facility] ([facility])  Heterogeneously dense.  BI-RADS 1: Negative    2023, MM[facility] Diagnostic Right w/CAD, US Breast Right Limited (BHL)  Heterogeneously dense.  There is a marker overlying the inner central anterior right breast marking the area of concern indicated by the patient. No suspicious focal mammographic abnormalities.  ULTRASOUND:  Targeted sonographic evaluation of the right breast was performed in the area of concern indicated by the patient at 3:00, 3 cm from the nipple and 4:00, 1 to 2 cm from the nipple. At 4:00, 2 cm from the nipple, there is approximately 0.6 x 0.5 x 0.5 cm hypoechoic mass like area with indistinct margins, which may reflect focal dense fibroglandular tissue but is suspicious. At 4:00, 1 cm from the nipple, there is a 0.4 cm benign-appearing cyst.  IMPRESSION:  1. Suspicious 0.6 cm mass like area at 4:00 in the right breast. Recommend further evaluation with ultrasound-guided core needle biopsy.  BI-RADS 4A: Low suspicion for malignancy    She had a biopsy on the following day that showed:   2023, US Guided Breast Biopsy (BHL)  4:00, 2cm from the nipple in the right breast. 10 gauge, 5 core specimens were obtained. A bowtie clip was then deployed. Post-procedural digital mammographic views of the right breast demonstrate  the bowtie clip at the expected location.  IMPRESSION:  Pathology is high risk and concordant.    Final Diagnosis   1. Right Breast at 4:00 o'clock 2 cm from Nipple (Not for Calcification), Core Biopsy:               A.  Rare minute focus of cribriform atypical ductal hyperplasia (ADH) (see comment).               B.  Fibroadenomatoid hyperplasia, focal clustered ductal cysts            She has not had a breast biopsy in the past.  She has had  her uterus and ovaries removed in 2009, is postmenopausal, and has taken estrogen patch since that time.  Her family history includes the following: Has 2 daughters, no sisters, 3 MA,1 PA- no FH breast or ovarian cancer in her family.  She is here for evaluation.    Review of Systems:  Review of Systems   All other systems reviewed and are negative.       Past Medical and Surgical History:  Breast Biopsy History:  Patient has had the following breast biopsies:3/29/23 ADH RIGHT BREAST  Breast Cancer HIstory:  Patient does not have a past medical history of breast cancer.  Breast Operations, and year:  NONE   Obstetric/Gynecologic History:  Age menstrual periods began: 14   Patient is postmenopausal due to removal of her uterus and both ovaries in the following year: 2009   Number of pregnancies:2  Number of live births: 2  Number of abortions or miscarriages: 0  Age of delivery of first child: 21  Patient did not breast feed.  Length of time taking birth control pills: NONE   Patient took hormone replacement during the following dates: and Patient is presently taking the following hormone replacement: 13 YRS, CURRENT USING ESTRADIOL PATCH     Past Surgical History:   Procedure Laterality Date   • CHOLECYSTECTOMY      Dr. EDER Alba   • COLONOSCOPY  05/18/2016    polyp, tics, tubular adenoma   • COLONOSCOPY  06/05/2019    normal ileum, diverticulosis, NBIH, TAx1   • ENDOSCOPY  05/18/2016    gastric ulcer w/clean base, acute gastritis. Leiva's esophagus   • ENDOSCOPY   "2019    normal esophagus/duodenum, acute gastritis   • HYSTERECTOMY     • OOPHORECTOMY     • SHOULDER ARTHROSCOPY Left     Dr. Plunkett   • UPPER GASTROINTESTINAL ENDOSCOPY  2016     Past Medical History:   Diagnosis Date   • Leiva esophagus    • Chest pain    • Gastric ulcer    • GERD (gastroesophageal reflux disease)    • Headache    • Pyelonephritis 10/31/2022   • Tubular adenoma of colon        Prior Hospitalizations, other than for surgery or childbirth, and year:  NONE     Social History     Socioeconomic History   • Marital status:    Tobacco Use   • Smoking status: Former     Packs/day: 1.00     Years: 20.00     Pack years: 20.00     Types: Cigarettes     Quit date:      Years since quittin.3   • Smokeless tobacco: Never   Vaping Use   • Vaping Use: Never used   Substance and Sexual Activity   • Alcohol use: Yes     Alcohol/week: 4.0 standard drinks     Types: 4 Glasses of wine per week     Comment: 4-6 PER WK   • Drug use: No   • Sexual activity: Yes     Partners: Male     Birth control/protection: None     Patient is .  Patient is employed full time with the following occupation: MEDICAL BILLING   Patient drinks 2 servings of caffeine per day.    Family History:  Family History   Problem Relation Age of Onset   • Heart disease Mother    • Inflammatory bowel disease Mother    • Colon cancer Mother    • Colon polyps Mother    • Liver cancer Mother    • Ulcerative colitis Mother    • Cerebral aneurysm Father    • Crohn's disease Brother    • Colon polyps Brother    • Crohn's disease Cousin    • Crohn's disease Cousin    • Heart attack Brother 48   • Heart attack Brother 40       Vital Signs:  /62 (BP Location: Right arm, Patient Position: Sitting, Cuff Size: Adult)   Pulse 79   Ht 165.1 cm (65\")   Wt 54.7 kg (120 lb 9.6 oz)   LMP  (LMP Unknown) Comment: complete  SpO2 98%   BMI 20.07 kg/m²      Medications:    Current Outpatient Medications:   •  acetaminophen " (TYLENOL) 325 MG tablet, Take 2 tablets by mouth Every 6 (Six) Hours As Needed for Mild Pain., Disp: , Rfl:   •  Aimovig 140 MG/ML auto-injector, Inject 1 mL under the skin into the appropriate area as directed Every 30 (Thirty) Days., Disp: 1.12 mL, Rfl: 12  •  Calcium-Magnesium-Vitamin D (CALCIUM 500 PO), Take 1 tablet by mouth 2 (Two) Times a Day., Disp: , Rfl:   •  cholecalciferol (VITAMIN D3) 25 MCG (1000 UT) tablet, Take 1 tablet by mouth Daily., Disp: , Rfl:   •  estradiol (VIVELLE-DOT) 0.025 MG/24HR patch, Place 1 patch on the skin as directed by provider 2 (Two) Times a Week., Disp: 24 patch, Rfl: 3  •  linaclotide (Linzess) 145 MCG capsule capsule, Take 1 capsule by mouth Every Morning Before Breakfast., Disp: 90 capsule, Rfl: 3  •  Omega-3 Fatty Acids (FISH OIL) 1000 MG capsule capsule, Take 1 capsule by mouth Daily With Breakfast., Disp: , Rfl:   •  Restasis 0.05 % ophthalmic emulsion, Administer 1 drop to both eyes 2 (Two) Times a Day., Disp: , Rfl:   •  diphenhydrAMINE (BENADRYL) 25 mg, Take 1 capsule by mouth Every 6 (Six) Hours As Needed for Itching. (Patient not taking: Reported on 5/23/2023), Disp: , Rfl:   •  HYDROcodone-acetaminophen (NORCO) 5-325 MG per tablet, 1 tab po q 4-6 hour prn pain, wean to tylenol or ibuprofen, Disp: 10 tablet, Rfl: 0  •  ondansetron (ZOFRAN) 4 MG tablet, Take 1 tablet by mouth Every 8 (Eight) Hours As Needed for Nausea or Vomiting. May take 8 mg po q8hr prn is 4mg is not effective, Disp: 10 tablet, Rfl: 1  •  polyethylene glycol (MiraLax) 17 GM/SCOOP powder, Take 17 g by mouth Daily. Take cap of powder with 8oz water daily surrounding surgery and while on narcotic, Disp: 119 g, Rfl: 1     Allergies:  Allergies   Allergen Reactions   • Amitiza [Lubiprostone] Other (See Comments)     Migraines   • Codeine Hives   • Pantoprazole Nausea And Vomiting       Physical Examination:  /62 (BP Location: Right arm, Patient Position: Sitting, Cuff Size: Adult)   Pulse 79    "Ht 165.1 cm (65\")   Wt 54.7 kg (120 lb 9.6 oz)   LMP  (LMP Unknown) Comment: complete  SpO2 98%   BMI 20.07 kg/m²   General Appearance:  Patient is in no distress.  She is well kept and has an thin build.   Psychiatric:  Patient with appropriate mood and affect. Alert and oriented to self, time, and place.    Breast, RIGHT:  small sized, 34B, symmetric with the contralateral side.  Breast skin is without erythema, edema, rashes.  There are no visible abnormalities upon inspection during the arm-raising maneuver or with hands on hips in the sitting position. There is no nipple retraction, discharge or nipple/areolar skin changes. There is a tender palpable nodularity RIGHT breast 4:00, 2 CFN at her biopsy site. No erythema. There are no other masses palpable in the sitting or supine positions.    Breast, LEFT:  small sized, 34B, symmetric with the contralateral side.  Breast skin is without erythema, edema, rashes.  There are no visible abnormalities upon inspection during the arm-raising maneuver or with hands on hips in the sitting position. There is no nipple retraction, discharge or nipple/areolar skin changes.There are no masses palpable in the sitting or supine positions.    Lymphatic:  There is no axillary, cervical, infraclavicular, or supraclavicular adenopathy bilaterally.  Eyes:  Pupils are round and reactive to light.  Cardiovascular:  Heart rate and rhythm are regular.  Respiratory:  Lungs are clear bilaterally with no crackles or wheezes in any lung field.  Gastrointestinal:  Abdomen is soft, nondistended, and nontender.     Musculoskeletal:  Good strength in all 4 extremities.   There is good range of motion in both shoulders.    Skin:  No new skin lesions or rashes on the skin excluding the breast (see breast exam above).        Imagin/10/2021, Bilateral Screening [facility] w/[facility] ([facility])  Heterogeneously dense.  BI-RADS 1: Negative    2023, Bilateral Screening [facility] " w/[facility] ([facility])  Heterogeneously dense.  BI-RADS 1: Negative    03/02/2023, MMG Diagnostic Right w/CAD, US Breast Right Limited (BHL)  Heterogeneously dense.  There is a marker overlying the inner central anterior right breast marking the area of concern indicated by the patient. No suspicious focal mammographic abnormalities.  ULTRASOUND:  Targeted sonographic evaluation of the right breast was performed in the area of concern indicated by the patient at 3:00, 3 cm from the nipple and 4:00, 1 to 2 cm from the nipple. At 4:00, 2 cm from the nipple, there is approximately 0.6 x 0.5 x 0.5 cm hypoechoic mass like area with indistinct margins, which may reflect focal dense fibroglandular tissue but is suspicious. At 4:00, 1 cm from the nipple, there is a 0.4 cm benign-appearing cyst.  IMPRESSION:  2. Suspicious 0.6 cm mass like area at 4:00 in the right breast. Recommend further evaluation with ultrasound-guided core needle biopsy.  BI-RADS 4A: Low suspicion for malignancy    Pathology:  03/29/2023, US Guided Breast Biopsy (BHL)  4:00, 2cm from the nipple in the right breast. 10 gauge, 5 core specimens were obtained. A bowtie clip was then deployed. Post-procedural digital mammographic views of the right breast demonstrate the bowtie clip at the expected location.  IMPRESSION:  Pathology is high risk and concordant.    Final Diagnosis   1. Right Breast at 4:00 o'clock 2 cm from Nipple (Not for Calcification), Core Biopsy:               A.  Rare minute focus of cribriform atypical ductal hyperplasia (ADH) (see comment).               B.  Fibroadenomatoid hyperplasia, focal clustered ductal cysts        Procedures:      Assessment:   Diagnosis Plan   1. Atypical ductal hyperplasia of right breast  MRI Breast Bilateral With & Without Contrast    HYDROcodone-acetaminophen (NORCO) 5-325 MG per tablet      2. Mass of lower inner quadrant of right breast        3. PONV (postoperative nausea and  vomiting)        4. Abnormal ultrasound of breast        5. Hormone replacement therapy (postmenopausal)        1-2,4  RIGHT breast 4:00 2 CFN, just outside areolar margin. 6mm dense tissue on ultrasound at site of palpable  bowtie marker- core biopsy 3--   Rare focus cribiform ADH, FA hyperplasia, clustered ductal cysts      3-  Severe PONV    5-   Estradiol patch since  TAHBSO      Plan:  The patient goes by Iliana. She is here today with her daughter who is imaging manager at [facility] [employer].    We reviewed her imaging, imaging reports, pathology repors, history and exam together today.  We discussed that atypical hyperplasia, LCIS and family history are the 3 strongest risk factors for the development  of breast cancer outside of a known genetic predisposition. We discussed that for atypical hyperplasia that we recommend excision to ensure no pathologic upgrade and also to remove any remaining abnormal cells. We discussed that for a 5 year risk > 1.7, that we recommend consultation with medical oncology about risks and benefits of hormonal blockade. We discussed that for a risk (using a model that incorporates detailed family history) > 20% that we recommend MRI for surveillance.   Based on the above, I have recommended the followin- RIGHT breast ANALI guided excision atypical hyperplasia (discussed risks of bleeding, altered nipple sensation, infection, and alternatives of not excising)  2- preop MRI breast  3- postop consultation with medical oncology and high risk clinic    Note PONV, will discuss TIVA with anesthesia and prescribe zofran postop.  Her next routine mammogram is due 1-10-24 at [facility].          Alissa Head MD    Today I spent 45 minutes doing the following: Reviewing records, labs, outside imaging and reports in preparation for the patient visit; obtaining medical history; performing the physical exam; counseling and educating the patient and any available family or caregivers; ordering  medications, tests or procedures; coordinating care with any other physicians on her care team as needed, and documenting all of the above in the medical record as well as sending communications with her other healthcare professionals.    Next Appointment:  Return for surgery.      EMR Dragon/transcription disclaimer:    Please note that portions of this note were completed with a voice recognition program.

## 2023-05-23 NOTE — TELEPHONE ENCOUNTER
Pt notified of the following appts:    Breast MRI is scheduled on 6/28/2023 arrive at 2:15    [facility] at 8:30  PAT to follow at 10:30    Surgery is scheduled on 08/10/2023 per pt request arrive at 5:15.  Surgery start time will be 7:00    Post op appt with Dr. Head is scheduled on 08/21/2023 at 10:30    Pt verbalized understanding- surgery packet mailed today.     CMA

## 2023-05-23 NOTE — H&P
There are no changes in the history or physical exam, aside from any that are listed as an addendum at the bottom of this document.   Today, patient will go for the surgery listed in the plan below.    Chief Complaint: Iliana Reyes is a 57 y.o. female who was seen in consultation at the request of Miguel Carvajal MD  for abnormal breast imaging    History of Present Illness:  Patient presents with breast mass and abnormal breast imaging. She noted  A RIGHT breast mass in  after a normal mammogram in .  She noted no other new masses, skin changes, nipple discharge, nipple changes prior to her most recent imaging.  Her most recent imaging includes the followin/10/2021, Bilateral Screening MMG w/[facility] ([facility])  Heterogeneously dense.  BI-RADS 1: Negative    2023, Bilateral Screening MMG w/[facility] ([facility])  Heterogeneously dense.  BI-RADS 1: Negative    2023, MMG Diagnostic Right w/CAD, US Breast Right Limited (BHL)  Heterogeneously dense.  There is a marker overlying the inner central anterior right breast marking the area of concern indicated by the patient. No suspicious focal mammographic abnormalities.  ULTRASOUND:  Targeted sonographic evaluation of the right breast was performed in the area of concern indicated by the patient at 3:00, 3 cm from the nipple and 4:00, 1 to 2 cm from the nipple. At 4:00, 2 cm from the nipple, there is approximately 0.6 x 0.5 x 0.5 cm hypoechoic mass like area with indistinct margins, which may reflect focal dense fibroglandular tissue but is suspicious. At 4:00, 1 cm from the nipple, there is a 0.4 cm benign-appearing cyst.  IMPRESSION:  1. Suspicious 0.6 cm mass like area at 4:00 in the right breast. Recommend further evaluation with ultrasound-guided core needle biopsy.  BI-RADS 4A: Low suspicion for malignancy    She had a biopsy on the following day that showed:   2023, US Guided Breast Biopsy  ([facility])  4:00, 2cm from the nipple in the right breast. 10 gauge, 5 core specimens were obtained. A bowtie clip was then deployed. Post-procedural digital mammographic views of the right breast demonstrate the bowtie clip at the expected location.  IMPRESSION:  Pathology is high risk and concordant.    Final Diagnosis   1. Right Breast at 4:00 o'clock 2 cm from Nipple (Not for Calcification), Core Biopsy:               A.  Rare minute focus of cribriform atypical ductal hyperplasia (ADH) (see comment).               B.  Fibroadenomatoid hyperplasia, focal clustered ductal cysts            She has not had a breast biopsy in the past.  She has had  her uterus and ovaries removed in 2009, is postmenopausal, and has taken estrogen patch since that time.  Her family history includes the following: Has 2 daughters, no sisters, 3 MA,1 PA- no FH breast or ovarian cancer in her family.  She is here for evaluation.    Review of Systems:  Review of Systems   All other systems reviewed and are negative.       Past Medical and Surgical History:  Breast Biopsy History:  Patient has had the following breast biopsies:3/29/23 ADH RIGHT BREAST  Breast Cancer HIstory:  Patient does not have a past medical history of breast cancer.  Breast Operations, and year:  NONE   Obstetric/Gynecologic History:  Age menstrual periods began: 14   Patient is postmenopausal due to removal of her uterus and both ovaries in the following year: 2009   Number of pregnancies:2  Number of live births: 2  Number of abortions or miscarriages: 0  Age of delivery of first child: 21  Patient did not breast feed.  Length of time taking birth control pills: NONE   Patient took hormone replacement during the following dates: and Patient is presently taking the following hormone replacement: 13 YRS, CURRENT USING ESTRADIOL PATCH     Past Surgical History:   Procedure Laterality Date    CHOLECYSTECTOMY      Dr. EDER Alba    COLONOSCOPY  05/18/2016    polyp, tics,  tubular adenoma    COLONOSCOPY  2019    normal ileum, diverticulosis, NBIH, TAx1    ENDOSCOPY  2016    gastric ulcer w/clean base, acute gastritis. Leiva's esophagus    ENDOSCOPY  2019    normal esophagus/duodenum, acute gastritis    HYSTERECTOMY      OOPHORECTOMY      SHOULDER ARTHROSCOPY Left     Dr. Plunkett    UPPER GASTROINTESTINAL ENDOSCOPY  2016     Past Medical History:   Diagnosis Date    Leiva esophagus     Chest pain     Gastric ulcer     GERD (gastroesophageal reflux disease)     Headache     Pyelonephritis 10/31/2022    Tubular adenoma of colon        Prior Hospitalizations, other than for surgery or childbirth, and year:  NONE     Social History     Socioeconomic History    Marital status:    Tobacco Use    Smoking status: Former     Packs/day: 1.00     Years: 20.00     Pack years: 20.00     Types: Cigarettes     Quit date:      Years since quittin.3    Smokeless tobacco: Never   Vaping Use    Vaping Use: Never used   Substance and Sexual Activity    Alcohol use: Yes     Alcohol/week: 4.0 standard drinks     Types: 4 Glasses of wine per week     Comment: 4-6 PER WK    Drug use: No    Sexual activity: Yes     Partners: Male     Birth control/protection: None     Patient is .  Patient is employed full time with the following occupation: MEDICAL BILLING   Patient drinks 2 servings of caffeine per day.    Family History:  Family History   Problem Relation Age of Onset    Heart disease Mother     Inflammatory bowel disease Mother     Colon cancer Mother     Colon polyps Mother     Liver cancer Mother     Ulcerative colitis Mother     Cerebral aneurysm Father     Crohn's disease Brother     Colon polyps Brother     Crohn's disease Cousin     Crohn's disease Cousin     Heart attack Brother 48    Heart attack Brother 40       Vital Signs:  /62 (BP Location: Right arm, Patient Position: Sitting, Cuff Size: Adult)   Pulse  "79   Ht 165.1 cm (65\")   Wt 54.7 kg (120 lb 9.6 oz)   LMP  (LMP Unknown) Comment: complete  SpO2 98%   BMI 20.07 kg/mý      Medications:    Current Outpatient Medications:     acetaminophen (TYLENOL) 325 MG tablet, Take 2 tablets by mouth Every 6 (Six) Hours As Needed for Mild Pain., Disp: , Rfl:     Aimovig 140 MG/ML auto-injector, Inject 1 mL under the skin into the appropriate area as directed Every 30 (Thirty) Days., Disp: 1.12 mL, Rfl: 12    Calcium-Magnesium-Vitamin D (CALCIUM 500 PO), Take 1 tablet by mouth 2 (Two) Times a Day., Disp: , Rfl:     cholecalciferol (VITAMIN D3) 25 MCG (1000 UT) tablet, Take 1 tablet by mouth Daily., Disp: , Rfl:     estradiol (VIVELLE-DOT) 0.025 MG/24HR patch, Place 1 patch on the skin as directed by provider 2 (Two) Times a Week., Disp: 24 patch, Rfl: 3    linaclotide (Linzess) 145 MCG capsule capsule, Take 1 capsule by mouth Every Morning Before Breakfast., Disp: 90 capsule, Rfl: 3    Omega-3 Fatty Acids (FISH OIL) 1000 MG capsule capsule, Take 1 capsule by mouth Daily With Breakfast., Disp: , Rfl:     Restasis 0.05 % ophthalmic emulsion, Administer 1 drop to both eyes 2 (Two) Times a Day., Disp: , Rfl:     diphenhydrAMINE (BENADRYL) 25 mg, Take 1 capsule by mouth Every 6 (Six) Hours As Needed for Itching. (Patient not taking: Reported on 5/23/2023), Disp: , Rfl:     HYDROcodone-acetaminophen (NORCO) 5-325 MG per tablet, 1 tab po q 4-6 hour prn pain, wean to tylenol or ibuprofen, Disp: 10 tablet, Rfl: 0    ondansetron (ZOFRAN) 4 MG tablet, Take 1 tablet by mouth Every 8 (Eight) Hours As Needed for Nausea or Vomiting. May take 8 mg po q8hr prn is 4mg is not effective, Disp: 10 tablet, Rfl: 1    polyethylene glycol (MiraLax) 17 GM/SCOOP powder, Take 17 g by mouth Daily. Take cap of powder with 8oz water daily surrounding surgery and while on narcotic, Disp: 119 g, Rfl: 1     Allergies:  Allergies   Allergen Reactions    Amitiza [Lubiprostone] Other (See " "Comments)     Migraines    Codeine Hives    Pantoprazole Nausea And Vomiting       Physical Examination:  /62 (BP Location: Right arm, Patient Position: Sitting, Cuff Size: Adult)   Pulse 79   Ht 165.1 cm (65\")   Wt 54.7 kg (120 lb 9.6 oz)   LMP  (LMP Unknown) Comment: complete  SpO2 98%   BMI 20.07 kg/mý   General Appearance:  Patient is in no distress.  She is well kept and has an thin build.   Psychiatric:  Patient with appropriate mood and affect. Alert and oriented to self, time, and place.    Breast, RIGHT:  small sized, 34B, symmetric with the contralateral side.  Breast skin is without erythema, edema, rashes.  There are no visible abnormalities upon inspection during the arm-raising maneuver or with hands on hips in the sitting position. There is no nipple retraction, discharge or nipple/areolar skin changes. There is a tender palpable nodularity RIGHT breast 4:00, 2 CFN at her biopsy site. No erythema. There are no other masses palpable in the sitting or supine positions.    Breast, LEFT:  small sized, 34B, symmetric with the contralateral side.  Breast skin is without erythema, edema, rashes.  There are no visible abnormalities upon inspection during the arm-raising maneuver or with hands on hips in the sitting position. There is no nipple retraction, discharge or nipple/areolar skin changes.There are no masses palpable in the sitting or supine positions.    Lymphatic:  There is no axillary, cervical, infraclavicular, or supraclavicular adenopathy bilaterally.  Eyes:  Pupils are round and reactive to light.  Cardiovascular:  Heart rate and rhythm are regular.  Respiratory:  Lungs are clear bilaterally with no crackles or wheezes in any lung field.  Gastrointestinal:  Abdomen is soft, nondistended, and nontender.     Musculoskeletal:  Good strength in all 4 extremities.   There is good range of motion in both shoulders.    Skin:  No new skin lesions or rashes on the skin excluding the breast " (see breast exam above).        Imagin/10/2021, Bilateral Screening MMG w/[facility] ([facility])  Heterogeneously dense.  BI-RADS 1: Negative    2023, Bilateral Screening [facility] w/[facility] ([facility])  Heterogeneously dense.  BI-RADS 1: Negative    2023, MMG Diagnostic Right w/CAD, US Breast Right Limited (BHL)  Heterogeneously dense.  There is a marker overlying the inner central anterior right breast marking the area of concern indicated by the patient. No suspicious focal mammographic abnormalities.  ULTRASOUND:  Targeted sonographic evaluation of the right breast was performed in the area of concern indicated by the patient at 3:00, 3 cm from the nipple and 4:00, 1 to 2 cm from the nipple. At 4:00, 2 cm from the nipple, there is approximately 0.6 x 0.5 x 0.5 cm hypoechoic mass like area with indistinct margins, which may reflect focal dense fibroglandular tissue but is suspicious. At 4:00, 1 cm from the nipple, there is a 0.4 cm benign-appearing cyst.  IMPRESSION:  2. Suspicious 0.6 cm mass like area at 4:00 in the right breast. Recommend further evaluation with ultrasound-guided core needle biopsy.  BI-RADS 4A: Low suspicion for malignancy    Pathology:  2023, US Guided Breast Biopsy (BHL)  4:00, 2cm from the nipple in the right breast. 10 gauge, 5 core specimens were obtained. A bowtie clip was then deployed. Post-procedural digital mammographic views of the right breast demonstrate the bowtie clip at the expected location.  IMPRESSION:  Pathology is high risk and concordant.    Final Diagnosis   1. Right Breast at 4:00 o'clock 2 cm from Nipple (Not for Calcification), Core Biopsy:               A.  Rare minute focus of cribriform atypical ductal hyperplasia (ADH) (see comment).               B.  Fibroadenomatoid hyperplasia, focal clustered ductal cysts        Procedures:      Assessment:   Diagnosis Plan   1. Atypical ductal hyperplasia of right breast   MRI Breast Bilateral With & Without Contrast    HYDROcodone-acetaminophen (NORCO) 5-325 MG per tablet      2. Mass of lower inner quadrant of right breast        3. PONV (postoperative nausea and vomiting)        4. Abnormal ultrasound of breast        5. Hormone replacement therapy (postmenopausal)        1-2,4  RIGHT breast 4:00 2 CFN, just outside areolar margin. 6mm dense tissue on ultrasound at site of palpable  bowtie marker- core biopsy 3--   Rare focus cribiform ADH, FA hyperplasia, clustered ductal cysts      3-  Severe PONV    5-   Estradiol patch since  TAHBSO      Plan:  The patient goes by Iliana. She is here today with her daughter who is imaging manager at [employer].    We reviewed her imaging, imaging reports, pathology repors, history and exam together today.  We discussed that atypical hyperplasia, LCIS and family history are the 3 strongest risk factors for the development  of breast cancer outside of a known genetic predisposition. We discussed that for atypical hyperplasia that we recommend excision to ensure no pathologic upgrade and also to remove any remaining abnormal cells. We discussed that for a 5 year risk > 1.7, that we recommend consultation with medical oncology about risks and benefits of hormonal blockade. We discussed that for a risk (using a model that incorporates detailed family history) > 20% that we recommend MRI for surveillance.   Based on the above, I have recommended the followin- RIGHT breast ANALI guided excision atypical hyperplasia (discussed risks of bleeding, altered nipple sensation, infection, and alternatives of not excising)  2- preop MRI breast  3- postop consultation with medical oncology and high risk clinic    Note PONV, will discuss TIVA with anesthesia and prescribe zofran postop.  Her next routine mammogram is [facility] 1-10-24 at [facility].          Alissa Head MD    Today I spent 45 minutes doing the following: Reviewing records, labs, outside imaging and  reports in preparation for the patient visit; obtaining medical history; performing the physical exam; counseling and educating the patient and any available family or caregivers; ordering medications, tests or procedures; coordinating care with any other physicians on her care team as needed, and documenting all of the above in the medical record as well as sending communications with her other healthcare professionals.    Next Appointment:  Return for surgery.      EMR Dragon/transcription disclaimer:    Please note that portions of this note were completed with a voice recognition program.

## 2023-05-25 ENCOUNTER — TELEPHONE (OUTPATIENT)
Dept: INTERNAL MEDICINE | Facility: CLINIC | Age: 58
End: 2023-05-25
Payer: COMMERCIAL

## 2023-05-25 DIAGNOSIS — Z87.898 H/O MOTION SICKNESS: Primary | ICD-10-CM

## 2023-05-25 RX ORDER — SCOLOPAMINE TRANSDERMAL SYSTEM 1 MG/1
1 PATCH, EXTENDED RELEASE TRANSDERMAL
Qty: 3 PATCH | Refills: 0 | Status: SHIPPED | OUTPATIENT
Start: 2023-05-25

## 2023-05-25 NOTE — TELEPHONE ENCOUNTER
"Called pt to get peer to peer info     She asked about her message from yesterday     \"Good morning - can I request a RX for anti motion sickness like a patch? We are going on a cruise and I do have issues with motion sickness and trying to be prepared. Or is there something Minda can recommend over the counter?\"      "

## 2023-06-08 ENCOUNTER — TELEPHONE (OUTPATIENT)
Dept: INTERNAL MEDICINE | Facility: CLINIC | Age: 58
End: 2023-06-08

## 2023-06-08 NOTE — TELEPHONE ENCOUNTER
Caller: LUIS    Relationship to patient: Other    Patient is needing: LUIS IS CALLING FROM THE PATIENT'S INSURANCE REGARDING THE PRIOR AUTHORIZATION FOR AIMOVIG THAT WAS DENIED.     SHE STATES SHE IS CALLING ON BEHALF OF THE PATIENT TO EXPLAIN TO AKBAR AQUINO AS TO WHY THIS MEDICATION WAS DENIED.    LUIS STATES THAT A NEW REQUEST CAN BE SUBMITTED FOR THIS MEDICATION, BUT THAT IT WILL ALSO NEED TO INCLUDE MEDICAL DOCUMENTATION THAT PATIENT HAS HAD IMPROVEMENT ON THIS MEDICATION WITH A REDUCTION IN THE NUMBER OF MIGRAINES, OR AN ALTERNATIVE MEDICATION CAN BE PRESCRIBED.         IF AKBAR AQUINO HAS ADDITIONAL QUESTIONS, PLEASE CONTACT LUIS  [phone].

## 2023-08-04 ENCOUNTER — PRE-ADMISSION TESTING (OUTPATIENT)
Dept: PREADMISSION TESTING | Facility: HOSPITAL | Age: 58
End: 2023-08-04
Payer: COMMERCIAL

## 2023-08-04 ENCOUNTER — HOSPITAL ENCOUNTER (OUTPATIENT)
Dept: ULTRASOUND IMAGING | Facility: HOSPITAL | Age: 58
Discharge: HOME | End: 2023-08-04
Payer: COMMERCIAL

## 2023-08-04 ENCOUNTER — HOSPITAL ENCOUNTER (OUTPATIENT)
Dept: MAMMOGRAPHY | Facility: HOSPITAL | Age: 58
Discharge: HOME | End: 2023-08-04
Payer: COMMERCIAL

## 2023-08-04 VITALS
RESPIRATION RATE: 16 BRPM | HEART RATE: 64 BPM | HEIGHT: 65 IN | SYSTOLIC BLOOD PRESSURE: 126 MMHG | BODY MASS INDEX: 19.66 KG/M2 | OXYGEN SATURATION: 100 % | TEMPERATURE: 97.8 F | DIASTOLIC BLOOD PRESSURE: 74 MMHG | WEIGHT: 118 LBS

## 2023-08-04 VITALS
OXYGEN SATURATION: 100 % | BODY MASS INDEX: 19.49 KG/M2 | SYSTOLIC BLOOD PRESSURE: 107 MMHG | WEIGHT: 117 LBS | RESPIRATION RATE: 16 BRPM | DIASTOLIC BLOOD PRESSURE: 73 MMHG | HEIGHT: 65 IN | TEMPERATURE: 97.6 F | HEART RATE: 69 BPM

## 2023-08-04 DIAGNOSIS — N60.91 ATYPICAL DUCTAL HYPERPLASIA OF RIGHT BREAST: ICD-10-CM

## 2023-08-04 DIAGNOSIS — R92.8 ABNORMAL MAMMOGRAM: ICD-10-CM

## 2023-08-04 LAB
ANION GAP SERPL CALCULATED.3IONS-SCNC: 8.9 MMOL/L (ref 5–15)
BUN SERPL-MCNC: 7 MG/DL (ref 6–20)
BUN/CREAT SERPL: 11.3 (ref 7–25)
CALCIUM SPEC-SCNC: 9 MG/DL (ref 8.6–10.5)
CHLORIDE SERPL-SCNC: 101 MMOL/L (ref 98–107)
CO2 SERPL-SCNC: 28.1 MMOL/L (ref 22–29)
CREAT SERPL-MCNC: 0.62 MG/DL (ref 0.57–1)
DEPRECATED RDW RBC AUTO: 36.2 FL (ref 37–54)
EGFRCR SERPLBLD CKD-EPI 2021: 104.7 ML/MIN/1.73
ERYTHROCYTE [DISTWIDTH] IN BLOOD BY AUTOMATED COUNT: 11 % (ref 12.3–15.4)
GLUCOSE SERPL-MCNC: 73 MG/DL (ref 65–99)
HCT VFR BLD AUTO: 42 % (ref 34–46.6)
HGB BLD-MCNC: 13.7 G/DL (ref 12–15.9)
MCH RBC QN AUTO: 29.7 PG (ref 26.6–33)
MCHC RBC AUTO-ENTMCNC: 32.6 G/DL (ref 31.5–35.7)
MCV RBC AUTO: 91.1 FL (ref 79–97)
PLATELET # BLD AUTO: 228 10*3/MM3 (ref 140–450)
PMV BLD AUTO: 11.9 FL (ref 6–12)
POTASSIUM SERPL-SCNC: 4.4 MMOL/L (ref 3.5–5.2)
RBC # BLD AUTO: 4.61 10*6/MM3 (ref 3.77–5.28)
SODIUM SERPL-SCNC: 138 MMOL/L (ref 136–145)
WBC NRBC COR # BLD: 4.98 10*3/MM3 (ref 3.4–10.8)

## 2023-08-04 PROCEDURE — 85027 COMPLETE CBC AUTOMATED: CPT

## 2023-08-04 PROCEDURE — C1728 CATH, BRACHYTX SEED ADM: HCPCS

## 2023-08-04 PROCEDURE — 80048 BASIC METABOLIC PNL TOTAL CA: CPT | Performed by: SURGERY

## 2023-08-04 PROCEDURE — 0 LIDOCAINE 1 % SOLUTION: Performed by: RADIOLOGY

## 2023-08-04 PROCEDURE — 77065 DX MAMMO INCL CAD UNI: CPT

## 2023-08-04 PROCEDURE — 36415 COLL VENOUS BLD VENIPUNCTURE: CPT

## 2023-08-04 RX ORDER — LIDOCAINE HYDROCHLORIDE 10 MG/ML
10 INJECTION, SOLUTION INFILTRATION; PERINEURAL ONCE
Status: COMPLETED | OUTPATIENT
Start: 2023-08-04 | End: 2023-08-04

## 2023-08-04 RX ORDER — CHLORHEXIDINE GLUCONATE 500 MG/1
CLOTH TOPICAL
COMMUNITY

## 2023-08-04 RX ADMIN — LIDOCAINE HYDROCHLORIDE 3.5 ML: 10 INJECTION, SOLUTION INFILTRATION; PERINEURAL at 10:07

## 2023-08-04 NOTE — NURSING NOTE
Patient returned from ANALI deployment procedure. Insertion site to right medial breast clear with steri strips dry and intact over site. Dr. Conner's location markings present (just medial to the steri strips and areola) and covered by Tegaderm. Patient aware this must remain in place on skin until surgery. Ice pack with protective covering applied to insertion site. Discharge instructions explained with understanding voiced by patient. Copy to patient. Escorted patient to PAT Dept. For her next appt.

## 2023-08-04 NOTE — DISCHARGE INSTRUCTIONS
Take the following medications the morning of surgery: NONE      If you are on prescription narcotic pain medication to control your pain you may also take that medication the morning of surgery.    General Instructions:  Do not eat solid food after midnight the night before surgery.  You may drink clear liquids day of surgery but must stop at least one hour before your hospital arrival time.  It is beneficial for you to have a clear drink that contains carbohydrates the day of surgery.  We suggest a 12 to 20 ounce bottle of Gatorade or Powerade for non-diabetic patients or a 12 to 20 ounce bottle of G2 or Powerade Zero for diabetic patients.    Clear liquids are liquids you can see through.  Nothing red in color.     Plain water                               Sports drinks  Sodas                                   Gelatin (Jell-O)  Fruit juices without pulp such as white grape juice and apple juice  Popsicles that contain no fruit or yogurt  Tea or coffee (no cream or milk added)  Gatorade / Powerade  G2 / Powerade Zero    Patients who avoid smoking, chewing tobacco and alcohol for 4 weeks prior to surgery have a reduced risk of post-operative complications.  Quit smoking as many days before surgery as you can.  Do not smoke, use chewing tobacco or drink alcohol the day of surgery.   If applicable bring your C-PAP/ BI-PAP machine in with you to preop day of surgery.  Bring any papers given to you in the doctor's office.  Wear clean comfortable clothes.  Do not wear contact lenses, false eyelashes or make-up.  Bring a case for your glasses.   Remove all piercings.  Leave jewelry and any other valuables at home.  The Pre-Admission Testing nurse will instruct you to bring medications if unable to obtain an accurate list in Pre-Admission Testing.            Preventing a Surgical Site Infection:  For 2 to 3 days before surgery, avoid shaving with a razor because the razor can irritate skin and make it easier to develop an  infection.    Any areas of open skin can increase the risk of a post-operative wound infection by allowing bacteria to enter and travel throughout the body.  Notify your surgeon if you have any skin wounds / rashes even if it is not near the expected surgical site.  The area will need assessed to determine if surgery should be delayed until it is healed.  The night prior to surgery shower using a fresh bar of anti-bacterial soap (such as Dial) and clean washcloth.  Sleep in a clean bed with clean clothing.  Do not allow pets to sleep with you.  Shower on the morning of surgery using a fresh bar of anti-bacterial soap (such as Dial) and clean washcloth.  Dry with a clean towel and dress in clean clothing.  Ask your surgeon if you will be receiving antibiotics prior to surgery.  Make sure you, your family, and all healthcare providers clean their hands with soap and water or an alcohol based hand  before caring for you or your wound.    Day of surgery:  Your arrival time is approximately two hours before your scheduled surgery time.  Upon arrival, a Pre-op nurse and Anesthesiologist will review your health history, obtain vital signs, and answer questions you may have.  The only belongings needed at this time will be a list of your home medications and if applicable your C-PAP/BI-PAP machine.  A Pre-op nurse will start an IV and you may receive medication in preparation for surgery, including something to help you relax.     Please be aware that surgery does come with discomfort.  We want to make every effort to control your discomfort so please discuss any uncontrolled symptoms with your nurse.   Your doctor will most likely have prescribed pain medications.      If you are going home after surgery you will receive individualized written care instructions before being discharged.  A responsible adult must drive you to and from the hospital on the day of your surgery and stay with you for 24 hours.  Discharge  prescriptions can be filled by the hospital pharmacy during regular pharmacy hours.  If you are having surgery late in the day/evening your prescription may be e-prescribed to your pharmacy.  Please verify your pharmacy hours or chose a 24 hour pharmacy to avoid not having access to your prescription because your pharmacy has closed for the day.    If you are staying overnight following surgery, you will be transported to your hospital room following the recovery period.  [facility] has all private rooms.    If you have any questions please call Pre-Admission Testing at [phone].  Deductibles and co-payments are collected on the day of service. Please be prepared to pay the required co-pay, deductible or deposit on the day of service as defined by your plan.    CHLORHEXIDINE CLOTH INSTRUCTIONS  The morning of surgery follow these instructions using the Chlorhexidine cloths you've been given.  These steps reduce bacteria on the body.  Do not use the cloths near your eyes, ears mouth, genitalia or on open wounds.  Throw the cloths away after use but do not try to flush them down a toilet.      Open and remove one cloth at a time from the package.    Leave the cloth unfolded and begin the bathing.  Massage the skin with the cloths using gentle pressure to remove bacteria.  Do not scrub harshly.   Follow the steps below with one 2% CHG cloth per area (6 total cloths).  One cloth for neck, shoulders and chest.  One cloth for both arms, hands, fingers and underarms (do underarms last).  One cloth for the abdomen followed by groin.  One cloth for right leg and foot including between the toes.  One cloth for left leg and foot including between the toes.  The last cloth is to be used for the back of the neck, back and buttocks.    Allow the CHG to air dry 3 minutes on the skin which will give it time to work and decrease the chance of irritation.  The skin may feel sticky until it is dry.  Do not rinse  with water or any other liquid or you will lose the beneficial effects of the CHG.  If mild skin irritation occurs, do rinse the skin to remove the CHG.  Report this to the nurse at time of admission.  Do not apply lotions, creams, ointments, deodorants or perfumes after using the clothes. Dress in clean clothes before coming to the hospital.    Call your surgeon immediately if you experience any of the following symptoms:  Sore Throat  Shortness of Breath or difficulty breathing  Cough  Chills  Body soreness or muscle pain  Headache  Fever  New loss of taste or smell  Do not arrive for your surgery ill.  Your procedure will need to be rescheduled to another time.  You will need to call your physician before the day of surgery to avoid any unnecessary exposure to hospital staff as well as other patients.

## 2023-08-04 NOTE — H&P
Name: Iliana Reyes ADMIT: 2023   : 1965  PCP: Minda Sanchez APRN    MRN: [id number] LOS: 0 days   AGE/SEX: 57 y.o. female  ROOM: Room/bed info not found       Chief complaint R breast ADH    Present Illness or Internal History:  Patient is a 57 y.o. female presents with R breast ADH for preop ANAIL loc.     Past Surgical History:  Past Surgical History:   Procedure Laterality Date    BREAST BIOPSY      CHOLECYSTECTOMY      Dr. EDER Alba    COLONOSCOPY  2016    polyp, tics, tubular adenoma    COLONOSCOPY  2019    normal ileum, diverticulosis, NBIH, TAx1    ENDOSCOPY  2016    gastric ulcer w/clean base, acute gastritis. Leiva's esophagus    ENDOSCOPY  2019    normal esophagus/duodenum, acute gastritis    HYSTERECTOMY      OOPHORECTOMY      SHOULDER ARTHROSCOPY Left     Dr. Plunkett    UPPER GASTROINTESTINAL ENDOSCOPY  2016       Past Medical History:  Past Medical History:   Diagnosis Date    Leiva esophagus     Chest pain     Gastric ulcer     GERD (gastroesophageal reflux disease)     Headache     Pyelonephritis 10/31/2022    Tubular adenoma of colon        Home Medications:  (Not in a hospital admission)      Allergies:  Amitiza [lubiprostone], Codeine, and Pantoprazole    Family History:  Family History   Problem Relation Age of Onset    Heart disease Mother     Inflammatory bowel disease Mother     Colon cancer Mother     Colon polyps Mother     Liver cancer Mother     Ulcerative colitis Mother     Cerebral aneurysm Father     Crohn's disease Brother     Colon polyps Brother     Crohn's disease Cousin     Crohn's disease Cousin     Heart attack Brother 48    Heart attack Brother 40       Social History:  Social History     Tobacco Use    Smoking status: Former     Packs/day: 1.00     Years: 20.00     Pack years: 20.00     Types: Cigarettes     Quit date:      Years since quittin.5    Smokeless tobacco: Never   Vaping Use    Vaping Use:  Never used   Substance Use Topics    Alcohol use: Yes     Alcohol/week: 4.0 standard drinks     Types: 4 Glasses of wine per week     Comment: 4-6 PER WK    Drug use: No        Objective     Physical Exam:    No exam performed today,    Vital Signs  Temp:  [97.8 øF (36.6 øC)] 97.8 øF (36.6 øC)  Heart Rate:  [78] 78  Resp:  [16] 16  BP: (120)/(77) 120/77    Anticipated Surgical Procedure:  Right breast preoperative ANALI  localization    The risks, benefits and alternatives of this procedure have been discussed with the patient or responsible party: Yes        Laquita Conner MD  08/04/23  09:41 EDT

## 2023-08-10 ENCOUNTER — HOSPITAL ENCOUNTER (OUTPATIENT)
Facility: HOSPITAL | Age: 58
Setting detail: HOSPITAL OUTPATIENT SURGERY
Discharge: HOME | End: 2023-08-10
Attending: SURGERY | Admitting: SURGERY
Payer: COMMERCIAL

## 2023-08-10 ENCOUNTER — ANESTHESIA EVENT (OUTPATIENT)
Dept: PERIOP | Facility: HOSPITAL | Age: 58
End: 2023-08-10
Payer: COMMERCIAL

## 2023-08-10 ENCOUNTER — APPOINTMENT (OUTPATIENT)
Dept: GENERAL RADIOLOGY | Facility: HOSPITAL | Age: 58
End: 2023-08-10
Payer: COMMERCIAL

## 2023-08-10 ENCOUNTER — TRANSCRIBE ORDERS (OUTPATIENT)
Dept: LAB | Facility: HOSPITAL | Age: 58
End: 2023-08-10

## 2023-08-10 ENCOUNTER — ANESTHESIA (OUTPATIENT)
Dept: PERIOP | Facility: HOSPITAL | Age: 58
End: 2023-08-10
Payer: COMMERCIAL

## 2023-08-10 VITALS
HEART RATE: 51 BPM | OXYGEN SATURATION: 98 % | SYSTOLIC BLOOD PRESSURE: 108 MMHG | DIASTOLIC BLOOD PRESSURE: 73 MMHG | TEMPERATURE: 97.6 F | RESPIRATION RATE: 16 BRPM

## 2023-08-10 DIAGNOSIS — N60.91 ATYPICAL DUCTAL HYPERPLASIA OF RIGHT BREAST: ICD-10-CM

## 2023-08-10 DIAGNOSIS — Z00.00 ENCOUNTER FOR GENERAL ADULT MEDICAL EXAMINATION W/O ABNORMAL FINDINGS: Primary | ICD-10-CM

## 2023-08-10 DIAGNOSIS — Z01.818 PRE-OP EXAM: ICD-10-CM

## 2023-08-10 PROCEDURE — 25010000002 BUPIVACAINE (PF) 0.25 % SOLUTION 10 ML VIAL: Performed by: SURGERY

## 2023-08-10 PROCEDURE — S0260 H&P FOR SURGERY: HCPCS | Performed by: SURGERY

## 2023-08-10 PROCEDURE — 25010000002 DEXAMETHASONE SODIUM PHOSPHATE 20 MG/5ML SOLUTION

## 2023-08-10 PROCEDURE — 76098 X-RAY EXAM SURGICAL SPECIMEN: CPT

## 2023-08-10 PROCEDURE — 0 LIDOCAINE 1 % SOLUTION 20 ML VIAL: Performed by: SURGERY

## 2023-08-10 PROCEDURE — 88307 TISSUE EXAM BY PATHOLOGIST: CPT | Performed by: SURGERY

## 2023-08-10 PROCEDURE — 25010000002 DIPHENHYDRAMINE PER 50 MG

## 2023-08-10 PROCEDURE — 25010000002 PROPOFOL 10 MG/ML EMULSION

## 2023-08-10 PROCEDURE — 19125 EXCISION BREAST LESION: CPT | Performed by: SURGERY

## 2023-08-10 PROCEDURE — 25010000002 FENTANYL CITRATE (PF) 50 MCG/ML SOLUTION

## 2023-08-10 PROCEDURE — 25010000002 CEFAZOLIN IN DEXTROSE 2-4 GM/100ML-% SOLUTION: Performed by: SURGERY

## 2023-08-10 PROCEDURE — 25010000002 DROPERIDOL PER 5 MG

## 2023-08-10 RX ORDER — DIPHENHYDRAMINE HYDROCHLORIDE 50 MG/ML
INJECTION INTRAMUSCULAR; INTRAVENOUS AS NEEDED
Status: DISCONTINUED | OUTPATIENT
Start: 2023-08-10 | End: 2023-08-10 | Stop reason: SURG

## 2023-08-10 RX ORDER — SODIUM CHLORIDE, SODIUM LACTATE, POTASSIUM CHLORIDE, CALCIUM CHLORIDE 600; 310; 30; 20 MG/100ML; MG/100ML; MG/100ML; MG/100ML
100 INJECTION, SOLUTION INTRAVENOUS CONTINUOUS
Status: DISCONTINUED | OUTPATIENT
Start: 2023-08-10 | End: 2023-08-10 | Stop reason: HOSPADM

## 2023-08-10 RX ORDER — ONDANSETRON 2 MG/ML
4 INJECTION INTRAMUSCULAR; INTRAVENOUS ONCE AS NEEDED
Status: DISCONTINUED | OUTPATIENT
Start: 2023-08-10 | End: 2023-08-10 | Stop reason: HOSPADM

## 2023-08-10 RX ORDER — FENTANYL CITRATE 50 UG/ML
50 INJECTION, SOLUTION INTRAMUSCULAR; INTRAVENOUS ONCE AS NEEDED
Status: DISCONTINUED | OUTPATIENT
Start: 2023-08-10 | End: 2023-08-10 | Stop reason: HOSPADM

## 2023-08-10 RX ORDER — HYDROCODONE BITARTRATE AND ACETAMINOPHEN 5; 325 MG/1; MG/1
1 TABLET ORAL ONCE AS NEEDED
Status: COMPLETED | OUTPATIENT
Start: 2023-08-10 | End: 2023-08-10

## 2023-08-10 RX ORDER — IPRATROPIUM BROMIDE AND ALBUTEROL SULFATE 2.5; .5 MG/3ML; MG/3ML
3 SOLUTION RESPIRATORY (INHALATION) ONCE AS NEEDED
Status: DISCONTINUED | OUTPATIENT
Start: 2023-08-10 | End: 2023-08-10 | Stop reason: HOSPADM

## 2023-08-10 RX ORDER — DROPERIDOL 2.5 MG/ML
INJECTION, SOLUTION INTRAMUSCULAR; INTRAVENOUS AS NEEDED
Status: DISCONTINUED | OUTPATIENT
Start: 2023-08-10 | End: 2023-08-10 | Stop reason: SURG

## 2023-08-10 RX ORDER — SODIUM CHLORIDE, SODIUM LACTATE, POTASSIUM CHLORIDE, CALCIUM CHLORIDE 600; 310; 30; 20 MG/100ML; MG/100ML; MG/100ML; MG/100ML
9 INJECTION, SOLUTION INTRAVENOUS CONTINUOUS
Status: DISCONTINUED | OUTPATIENT
Start: 2023-08-10 | End: 2023-08-10 | Stop reason: HOSPADM

## 2023-08-10 RX ORDER — MIDAZOLAM HYDROCHLORIDE 1 MG/ML
1 INJECTION INTRAMUSCULAR; INTRAVENOUS
Status: DISCONTINUED | OUTPATIENT
Start: 2023-08-10 | End: 2023-08-10 | Stop reason: HOSPADM

## 2023-08-10 RX ORDER — DROPERIDOL 2.5 MG/ML
0.62 INJECTION, SOLUTION INTRAMUSCULAR; INTRAVENOUS
Status: DISCONTINUED | OUTPATIENT
Start: 2023-08-10 | End: 2023-08-10 | Stop reason: HOSPADM

## 2023-08-10 RX ORDER — SODIUM CHLORIDE 0.9 % (FLUSH) 0.9 %
3 SYRINGE (ML) INJECTION EVERY 12 HOURS SCHEDULED
Status: DISCONTINUED | OUTPATIENT
Start: 2023-08-10 | End: 2023-08-10 | Stop reason: HOSPADM

## 2023-08-10 RX ORDER — LOTEPREDNOL ETABONATE 2 MG/ML
1 SUSPENSION/ DROPS OPHTHALMIC 2 TIMES DAILY
COMMUNITY
Start: 2023-07-19

## 2023-08-10 RX ORDER — DIAZEPAM 2 MG/1
8 TABLET ORAL ONCE
Status: COMPLETED | OUTPATIENT
Start: 2023-08-10 | End: 2023-08-10

## 2023-08-10 RX ORDER — PROMETHAZINE HYDROCHLORIDE 25 MG/1
25 SUPPOSITORY RECTAL ONCE AS NEEDED
Status: DISCONTINUED | OUTPATIENT
Start: 2023-08-10 | End: 2023-08-10 | Stop reason: HOSPADM

## 2023-08-10 RX ORDER — FENTANYL CITRATE 50 UG/ML
INJECTION, SOLUTION INTRAMUSCULAR; INTRAVENOUS AS NEEDED
Status: DISCONTINUED | OUTPATIENT
Start: 2023-08-10 | End: 2023-08-10 | Stop reason: SURG

## 2023-08-10 RX ORDER — CEFAZOLIN SODIUM 2 G/100ML
2 INJECTION, SOLUTION INTRAVENOUS ONCE
Status: COMPLETED | OUTPATIENT
Start: 2023-08-10 | End: 2023-08-10

## 2023-08-10 RX ORDER — PROMETHAZINE HYDROCHLORIDE 25 MG/1
25 TABLET ORAL ONCE AS NEEDED
Status: DISCONTINUED | OUTPATIENT
Start: 2023-08-10 | End: 2023-08-10 | Stop reason: HOSPADM

## 2023-08-10 RX ORDER — DEXAMETHASONE SODIUM PHOSPHATE 4 MG/ML
INJECTION, SOLUTION INTRA-ARTICULAR; INTRALESIONAL; INTRAMUSCULAR; INTRAVENOUS; SOFT TISSUE AS NEEDED
Status: DISCONTINUED | OUTPATIENT
Start: 2023-08-10 | End: 2023-08-10 | Stop reason: SURG

## 2023-08-10 RX ORDER — SODIUM CHLORIDE 0.9 % (FLUSH) 0.9 %
3-10 SYRINGE (ML) INJECTION AS NEEDED
Status: DISCONTINUED | OUTPATIENT
Start: 2023-08-10 | End: 2023-08-10 | Stop reason: HOSPADM

## 2023-08-10 RX ORDER — NALOXONE HCL 0.4 MG/ML
0.2 VIAL (ML) INJECTION AS NEEDED
Status: DISCONTINUED | OUTPATIENT
Start: 2023-08-10 | End: 2023-08-10 | Stop reason: HOSPADM

## 2023-08-10 RX ORDER — HYDRALAZINE HYDROCHLORIDE 20 MG/ML
5 INJECTION INTRAMUSCULAR; INTRAVENOUS
Status: DISCONTINUED | OUTPATIENT
Start: 2023-08-10 | End: 2023-08-10 | Stop reason: HOSPADM

## 2023-08-10 RX ORDER — LIDOCAINE HYDROCHLORIDE 20 MG/ML
INJECTION, SOLUTION INFILTRATION; PERINEURAL AS NEEDED
Status: DISCONTINUED | OUTPATIENT
Start: 2023-08-10 | End: 2023-08-10 | Stop reason: SURG

## 2023-08-10 RX ORDER — FAMOTIDINE 10 MG/ML
20 INJECTION, SOLUTION INTRAVENOUS ONCE
Status: COMPLETED | OUTPATIENT
Start: 2023-08-10 | End: 2023-08-10

## 2023-08-10 RX ORDER — LABETALOL HYDROCHLORIDE 5 MG/ML
5 INJECTION, SOLUTION INTRAVENOUS
Status: DISCONTINUED | OUTPATIENT
Start: 2023-08-10 | End: 2023-08-10 | Stop reason: HOSPADM

## 2023-08-10 RX ORDER — DIPHENHYDRAMINE HYDROCHLORIDE 50 MG/ML
12.5 INJECTION INTRAMUSCULAR; INTRAVENOUS
Status: DISCONTINUED | OUTPATIENT
Start: 2023-08-10 | End: 2023-08-10 | Stop reason: HOSPADM

## 2023-08-10 RX ORDER — SCOLOPAMINE TRANSDERMAL SYSTEM 1 MG/1
1 PATCH, EXTENDED RELEASE TRANSDERMAL
Status: DISCONTINUED | OUTPATIENT
Start: 2023-08-10 | End: 2023-08-10 | Stop reason: HOSPADM

## 2023-08-10 RX ORDER — EPHEDRINE SULFATE 50 MG/ML
5 INJECTION, SOLUTION INTRAVENOUS ONCE AS NEEDED
Status: DISCONTINUED | OUTPATIENT
Start: 2023-08-10 | End: 2023-08-10 | Stop reason: HOSPADM

## 2023-08-10 RX ORDER — FLUMAZENIL 0.1 MG/ML
0.2 INJECTION INTRAVENOUS AS NEEDED
Status: DISCONTINUED | OUTPATIENT
Start: 2023-08-10 | End: 2023-08-10 | Stop reason: HOSPADM

## 2023-08-10 RX ORDER — PROPOFOL 10 MG/ML
VIAL (ML) INTRAVENOUS AS NEEDED
Status: DISCONTINUED | OUTPATIENT
Start: 2023-08-10 | End: 2023-08-10 | Stop reason: SURG

## 2023-08-10 RX ORDER — FENTANYL CITRATE 50 UG/ML
50 INJECTION, SOLUTION INTRAMUSCULAR; INTRAVENOUS
Status: DISCONTINUED | OUTPATIENT
Start: 2023-08-10 | End: 2023-08-10 | Stop reason: HOSPADM

## 2023-08-10 RX ADMIN — DROPERIDOL 0.62 MG: 2.5 INJECTION, SOLUTION INTRAMUSCULAR; INTRAVENOUS at 07:10

## 2023-08-10 RX ADMIN — DEXAMETHASONE SODIUM PHOSPHATE 8 MG: 4 INJECTION, SOLUTION INTRAMUSCULAR; INTRAVENOUS at 07:10

## 2023-08-10 RX ADMIN — HYDROCODONE BITARTRATE AND ACETAMINOPHEN 1 TABLET: 5; 325 TABLET ORAL at 08:37

## 2023-08-10 RX ADMIN — LIDOCAINE HYDROCHLORIDE 100 MG: 20 INJECTION, SOLUTION INFILTRATION; PERINEURAL at 07:06

## 2023-08-10 RX ADMIN — PROPOFOL 150 MCG/KG/MIN: 10 INJECTION, EMULSION INTRAVENOUS at 07:10

## 2023-08-10 RX ADMIN — SODIUM CHLORIDE, POTASSIUM CHLORIDE, SODIUM LACTATE AND CALCIUM CHLORIDE 9 ML/HR: 600; 310; 30; 20 INJECTION, SOLUTION INTRAVENOUS at 06:49

## 2023-08-10 RX ADMIN — FENTANYL CITRATE 25 MCG: 50 INJECTION, SOLUTION INTRAMUSCULAR; INTRAVENOUS at 07:21

## 2023-08-10 RX ADMIN — SCOPALAMINE 1 PATCH: 1 PATCH, EXTENDED RELEASE TRANSDERMAL at 06:51

## 2023-08-10 RX ADMIN — PROPOFOL 180 MG: 10 INJECTION, EMULSION INTRAVENOUS at 07:06

## 2023-08-10 RX ADMIN — FENTANYL CITRATE 25 MCG: 50 INJECTION, SOLUTION INTRAMUSCULAR; INTRAVENOUS at 07:06

## 2023-08-10 RX ADMIN — FAMOTIDINE 20 MG: 10 INJECTION INTRAVENOUS at 06:48

## 2023-08-10 RX ADMIN — CEFAZOLIN SODIUM 2 G: 2 INJECTION, SOLUTION INTRAVENOUS at 06:49

## 2023-08-10 RX ADMIN — DIPHENHYDRAMINE HYDROCHLORIDE 12.5 MG: 50 INJECTION, SOLUTION INTRAMUSCULAR; INTRAVENOUS at 07:10

## 2023-08-10 RX ADMIN — DIAZEPAM 8 MG: 2 TABLET ORAL at 06:02

## 2023-08-10 NOTE — ANESTHESIA POSTPROCEDURE EVALUATION
Patient: Iliana Reyes    Procedure Summary       Date: 08/10/23 Room / Location: [facility] OR 09 / [facility] MAIN OR    Anesthesia Start: 0657 Anesthesia Stop: 0800    Procedure: RIGHT breast ANALI guided excision atypical hyperplasia (Right: Breast) Diagnosis:       Atypical ductal hyperplasia of right breast      (Atypical ductal hyperplasia of right breast [N60.91])    Surgeons: Alissa Head MD Provider: Jesus Arteaga MD    Anesthesia Type: general ASA Status: 2            Anesthesia Type: general    Vitals  Vitals Value Taken Time   /90 08/10/23 0845   Temp 36.4 øC (97.6 øF) 08/10/23 0845   Pulse 51 08/10/23 0857   Resp 14 08/10/23 0845   SpO2 99 % 08/10/23 0857   Vitals shown include unvalidated device data.        Post Anesthesia Care and Evaluation    Patient location during evaluation: PACU  Patient participation: complete - patient participated  Level of consciousness: awake and alert  Pain management: adequate    Airway patency: patent  Anesthetic complications: No anesthetic complications  PONV Status: controlled  Cardiovascular status: acceptable and hemodynamically stable  Respiratory status: acceptable  Hydration status: acceptable    Comments: /71 (BP Location: Left arm, Patient Position: Lying)   Pulse 53   Temp 36.4 øC (97.6 øF)   Resp 16   LMP  (LMP Unknown) Comment: complete  SpO2 98%

## 2023-08-10 NOTE — ANESTHESIA PREPROCEDURE EVALUATION
Anesthesia Evaluation     Patient summary reviewed and Nursing notes reviewed   history of anesthetic complications:  PONV  NPO Solid Status: > 8 hours  NPO Liquid Status: > 4 hours           Airway   Mallampati: II  TM distance: >3 FB  Neck ROM: full  No difficulty expected  Dental - normal exam     Pulmonary - normal exam   (+) a smoker Former,  Cardiovascular - normal exam    ECG reviewed  Rhythm: regular  Rate: normal    (+) valvular problems/murmurs MVP    ROS comment: EF 55%, mild MVP by ECHO 1/22    Neuro/Psych  (+) headaches    ROS Comment: Migraines/cerebral aneurysm (unruptured)  GI/Hepatic/Renal/Endo    (+) GERD, PUD    Musculoskeletal     Abdominal  - normal exam   Substance History      OB/GYN          Other          Other Comment: Atypical hyperplasia right breast                  Anesthesia Plan    ASA 2     general   total IV anesthesia  (Propofol TIVA with LMA/surgeon ordered Valium 8 mg p.o. preop)  intravenous induction     Anesthetic plan, risks, benefits, and alternatives have been provided, discussed and informed consent has been obtained with: patient.      CODE STATUS:

## 2023-08-10 NOTE — DISCHARGE INSTRUCTIONS
Dr. Alissa Head  [address] [facility]  [phone]    Lumpectomy, [place] Node Biopsy, Excisional Breast Biopsy, Postoperative Discharge Instructions         Keep 6 inch ace wrap in place and dressings dry postoperatively for a total of 72 hours. May then remove dressings and ACE wrap. Can shower and get affected area wet after dressings are removed. Prefer no soaking in the tub for one week. Leave steri-strips in place.   A responsible adult should accompany the patient on discharge and for 24 hours following surgery.   No heavy lifting (>5 lbs) or strenuous upper arm activity, and no raising of arms over the head until followup appointment.   For 24 hours postoperatively, or while taking pain or nausea medications - Do not drive, operate machinery or drink alcohol.   Call the office for any of the following symptoms:    Persistent bleeding   Excessive bruising or swelling   Excessive sleepiness or trouble breathing   Severe pain   Persistent nausea or vomiting   Fever greater than 101.   Patient should keep the postoperative visit that was scheduled for him/her in [facility]. If the patient has forgotten this date, please call the clinic for a reminder of the appointment time. If it is after hours, the patient can call the clinic the next business day for this reminder. [facility] phone number is [phone]        .  Scopolamine Patch  This patch has been applied to the skin behind one of your ears.  It may stay in place up to 24 hours. You may remove it at any time after your surgery; however, it should be removed after you are up and walking around the next day.  This medicine reduces stomach upset. Side effects may include: dry mouth, dizziness, sleepiness, constipation, or upset stomach.  An allergy would show up as: a rash, itching, wheezing or shortness of breath.  Follow these instructions:  Do not drink alcohol, drive or operate machinery while taking this  medicine.  Wear only 1 patch at a time. You can leave the patch on for up to 24 hours.  When you remove the patch, fold it in half with the sticky sides together and throw it away. Wash your hands and the area under the patch.  Do not touch your eye with your hand if it has touched the patch.  Wash your hands well before and after touching the patch.  Sit or stand slowly to avoid dizziness.  Call your doctor if you have:  Any sign of allergy  No relief  Trouble passing urine  Any new or severe symptoms

## 2023-08-10 NOTE — H&P
There are no changes in the history or physical exam, aside from any that are listed as an addendum at the bottom of this document.   Today, patient will go for the surgery listed in the plan below.    DIscussed with anesthesia PONV  Chief Complaint: Iliana Reyes is a 57 y.o. female who was seen in consultation at the request of Miguel Carvajal MD  for abnormal breast imaging    History of Present Illness:  Patient presents with breast mass and abnormal breast imaging. She noted  A RIGHT breast mass in  after a normal mammogram in .  She noted no other new masses, skin changes, nipple discharge, nipple changes prior to her most recent imaging.  Her most recent imaging includes the followin/10/2021, Bilateral Screening [facility] w/[facility] ([facility])  Heterogeneously dense.  BI-RADS 1: Negative    2023, Bilateral Screening [facility] w/[facility] ([facility])  Heterogeneously dense.  BI-RADS 1: Negative    2023, MM[facility] Diagnostic Right w/CAD, US Breast Right Limited (BHL)  Heterogeneously dense.  There is a marker overlying the inner central anterior right breast marking the area of concern indicated by the patient. No suspicious focal mammographic abnormalities.  ULTRASOUND:  Targeted sonographic evaluation of the right breast was performed in the area of concern indicated by the patient at 3:00, 3 cm from the nipple and 4:00, 1 to 2 cm from the nipple. At 4:00, 2 cm from the nipple, there is approximately 0.6 x 0.5 x 0.5 cm hypoechoic mass like area with indistinct margins, which may reflect focal dense fibroglandular tissue but is suspicious. At 4:00, 1 cm from the nipple, there is a 0.4 cm benign-appearing cyst.  IMPRESSION:  Suspicious 0.6 cm mass like area at 4:00 in the right breast. Recommend further evaluation with ultrasound-guided core needle biopsy.  BI-RADS 4A: Low suspicion for malignancy    She had a biopsy on the following day that showed:   2023,  US Guided Breast Biopsy (BHL)  4:00, 2cm from the nipple in the right breast. 10 gauge, 5 core specimens were obtained. A bowtie clip was then deployed. Post-procedural digital mammographic views of the right breast demonstrate the bowtie clip at the expected location.  IMPRESSION:  Pathology is high risk and concordant.    Final Diagnosis   1. Right Breast at 4:00 o'clock 2 cm from Nipple (Not for Calcification), Core Biopsy:               A.  Rare minute focus of cribriform atypical ductal hyperplasia (ADH) (see comment).               B.  Fibroadenomatoid hyperplasia, focal clustered ductal cysts            She has not had a breast biopsy in the past.  She has had  her uterus and ovaries removed in 2009, is postmenopausal, and has taken estrogen patch since that time.  Her family history includes the following: Has 2 daughters, no sisters, 3 MA,1 PA- no FH breast or ovarian cancer in her family.  She is here for evaluation.    Review of Systems:  Review of Systems   All other systems reviewed and are negative.       Past Medical and Surgical History:  Breast Biopsy History:  Patient has had the following breast biopsies:3/29/23 ADH RIGHT BREAST  Breast Cancer HIstory:  Patient does not have a past medical history of breast cancer.  Breast Operations, and year:  NONE   Obstetric/Gynecologic History:  Age menstrual periods began: 14   Patient is postmenopausal due to removal of her uterus and both ovaries in the following year: 2009   Number of pregnancies:2  Number of live births: 2  Number of abortions or miscarriages: 0  Age of delivery of first child: 21  Patient did not breast feed.  Length of time taking birth control pills: NONE   Patient took hormone replacement during the following dates: and Patient is presently taking the following hormone replacement: 13 YRS, CURRENT USING ESTRADIOL PATCH     Past Surgical History:   Procedure Laterality Date    CHOLECYSTECTOMY      Dr. EDER Alba    COLONOSCOPY   2016    polyp, tics, tubular adenoma    COLONOSCOPY  2019    normal ileum, diverticulosis, NBIH, TAx1    ENDOSCOPY  2016    gastric ulcer w/clean base, acute gastritis. Leiva's esophagus    ENDOSCOPY  2019    normal esophagus/duodenum, acute gastritis    HYSTERECTOMY      OOPHORECTOMY      SHOULDER ARTHROSCOPY Left     Dr. Plunkett    UPPER GASTROINTESTINAL ENDOSCOPY  2016     Past Medical History:   Diagnosis Date    Leiva esophagus     Chest pain     Gastric ulcer     GERD (gastroesophageal reflux disease)     Headache     Pyelonephritis 10/31/2022    Tubular adenoma of colon        Prior Hospitalizations, other than for surgery or childbirth, and year:  NONE     Social History     Socioeconomic History    Marital status:    Tobacco Use    Smoking status: Former     Packs/day: 1.00     Years: 20.00     Pack years: 20.00     Types: Cigarettes     Quit date:      Years since quittin.3    Smokeless tobacco: Never   Vaping Use    Vaping Use: Never used   Substance and Sexual Activity    Alcohol use: Yes     Alcohol/week: 4.0 standard drinks     Types: 4 Glasses of wine per week     Comment: 4-6 PER WK    Drug use: No    Sexual activity: Yes     Partners: Male     Birth control/protection: None     Patient is .  Patient is employed full time with the following occupation: MEDICAL BILLING   Patient drinks 2 servings of caffeine per day.    Family History:  Family History   Problem Relation Age of Onset    Heart disease Mother     Inflammatory bowel disease Mother     Colon cancer Mother     Colon polyps Mother     Liver cancer Mother     Ulcerative colitis Mother     Cerebral aneurysm Father     Crohn's disease Brother     Colon polyps Brother     Crohn's disease Cousin     Crohn's disease Cousin     Heart attack Brother 48    Heart attack Brother 40       Vital Signs:  /62 (BP Location: Right arm, Patient Position: Sitting, Cuff Size: Adult)   Pulse 79    "Ht 165.1 cm (65\")   Wt 54.7 kg (120 lb 9.6 oz)   LMP  (LMP Unknown) Comment: complete  SpO2 98%   BMI 20.07 kg/mý      Medications:    Current Outpatient Medications:     acetaminophen (TYLENOL) 325 MG tablet, Take 2 tablets by mouth Every 6 (Six) Hours As Needed for Mild Pain., Disp: , Rfl:     Aimovig 140 MG/ML auto-injector, Inject 1 mL under the skin into the appropriate area as directed Every 30 (Thirty) Days., Disp: 1.12 mL, Rfl: 12    Calcium-Magnesium-Vitamin D (CALCIUM 500 PO), Take 1 tablet by mouth 2 (Two) Times a Day., Disp: , Rfl:     cholecalciferol (VITAMIN D3) 25 MCG (1000 UT) tablet, Take 1 tablet by mouth Daily., Disp: , Rfl:     estradiol (VIVELLE-DOT) 0.025 MG/24HR patch, Place 1 patch on the skin as directed by provider 2 (Two) Times a Week., Disp: 24 patch, Rfl: 3    linaclotide (Linzess) 145 MCG capsule capsule, Take 1 capsule by mouth Every Morning Before Breakfast., Disp: 90 capsule, Rfl: 3    Omega-3 Fatty Acids (FISH OIL) 1000 MG capsule capsule, Take 1 capsule by mouth Daily With Breakfast., Disp: , Rfl:     Restasis 0.05 % ophthalmic emulsion, Administer 1 drop to both eyes 2 (Two) Times a Day., Disp: , Rfl:     diphenhydrAMINE (BENADRYL) 25 mg, Take 1 capsule by mouth Every 6 (Six) Hours As Needed for Itching. (Patient not taking: Reported on 5/23/2023), Disp: , Rfl:     HYDROcodone-acetaminophen (NORCO) 5-325 MG per tablet, 1 tab po q 4-6 hour prn pain, wean to tylenol or ibuprofen, Disp: 10 tablet, Rfl: 0    ondansetron (ZOFRAN) 4 MG tablet, Take 1 tablet by mouth Every 8 (Eight) Hours As Needed for Nausea or Vomiting. May take 8 mg po q8hr prn is 4mg is not effective, Disp: 10 tablet, Rfl: 1    polyethylene glycol (MiraLax) 17 GM/SCOOP powder, Take 17 g by mouth Daily. Take cap of powder with 8oz water daily surrounding surgery and while on narcotic, Disp: 119 g, Rfl: 1     Allergies:  Allergies   Allergen Reactions    Amitiza [Lubiprostone] Other (See Comments)     Migraines    " "Codeine Hives    Pantoprazole Nausea And Vomiting       Physical Examination:  /62 (BP Location: Right arm, Patient Position: Sitting, Cuff Size: Adult)   Pulse 79   Ht 165.1 cm (65\")   Wt 54.7 kg (120 lb 9.6 oz)   LMP  (LMP Unknown) Comment: complete  SpO2 98%   BMI 20.07 kg/mý   General Appearance:  Patient is in no distress.  She is well kept and has an thin build.   Psychiatric:  Patient with appropriate mood and affect. Alert and oriented to self, time, and place.    Breast, RIGHT:  small sized, 34B, symmetric with the contralateral side.  Breast skin is without erythema, edema, rashes.  There are no visible abnormalities upon inspection during the arm-raising maneuver or with hands on hips in the sitting position. There is no nipple retraction, discharge or nipple/areolar skin changes. There is a tender palpable nodularity RIGHT breast 4:00, 2 CFN at her biopsy site. No erythema. There are no other masses palpable in the sitting or supine positions.    Breast, LEFT:  small sized, 34B, symmetric with the contralateral side.  Breast skin is without erythema, edema, rashes.  There are no visible abnormalities upon inspection during the arm-raising maneuver or with hands on hips in the sitting position. There is no nipple retraction, discharge or nipple/areolar skin changes.There are no masses palpable in the sitting or supine positions.    Lymphatic:  There is no axillary, cervical, infraclavicular, or supraclavicular adenopathy bilaterally.  Eyes:  Pupils are round and reactive to light.  Cardiovascular:  Heart rate and rhythm are regular.  Respiratory:  Lungs are clear bilaterally with no crackles or wheezes in any lung field.  Gastrointestinal:  Abdomen is soft, nondistended, and nontender.     Musculoskeletal:  Good strength in all 4 extremities.   There is good range of motion in both shoulders.    Skin:  No new skin lesions or rashes on the skin excluding the breast (see breast exam " above).        Imagin/10/2021, Bilateral Screening [facility] w/[facility] ([facility])  Heterogeneously dense.  BI-RADS 1: Negative    2023, Bilateral Screening [facility] ([facility])  Heterogeneously dense.  BI-RADS 1: Negative    2023, MMG Diagnostic Right w/CAD, US Breast Right Limited (BHL)  Heterogeneously dense.  There is a marker overlying the inner central anterior right breast marking the area of concern indicated by the patient. No suspicious focal mammographic abnormalities.  ULTRASOUND:  Targeted sonographic evaluation of the right breast was performed in the area of concern indicated by the patient at 3:00, 3 cm from the nipple and 4:00, 1 to 2 cm from the nipple. At 4:00, 2 cm from the nipple, there is approximately 0.6 x 0.5 x 0.5 cm hypoechoic mass like area with indistinct margins, which may reflect focal dense fibroglandular tissue but is suspicious. At 4:00, 1 cm from the nipple, there is a 0.4 cm benign-appearing cyst.  IMPRESSION:  Suspicious 0.6 cm mass like area at 4:00 in the right breast. Recommend further evaluation with ultrasound-guided core needle biopsy.  BI-RADS 4A: Low suspicion for malignancy    Pathology:  2023, US Guided Breast Biopsy (BHL)  4:00, 2cm from the nipple in the right breast. 10 gauge, 5 core specimens were obtained. A bowtie clip was then deployed. Post-procedural digital mammographic views of the right breast demonstrate the bowtie clip at the expected location.  IMPRESSION:  Pathology is high risk and concordant.    Final Diagnosis   1. Right Breast at 4:00 o'clock 2 cm from Nipple (Not for Calcification), Core Biopsy:               A.  Rare minute focus of cribriform atypical ductal hyperplasia (ADH) (see comment).               B.  Fibroadenomatoid hyperplasia, focal clustered ductal cysts        Procedures:      Assessment:   Diagnosis Plan   1. Atypical ductal hyperplasia of right breast  MRI Breast Bilateral  With & Without Contrast    HYDROcodone-acetaminophen (NORCO) 5-325 MG per tablet      2. Mass of lower inner quadrant of right breast        3. PONV (postoperative nausea and vomiting)        4. Abnormal ultrasound of breast        5. Hormone replacement therapy (postmenopausal)        1-2,4  RIGHT breast 4:00 2 CFN, just outside areolar margin. 6mm dense tissue on ultrasound at site of palpable  bowtie marker- core biopsy 3--   Rare focus cribiform ADH, FA hyperplasia, clustered ductal cysts      3-  Severe PONV    5-   Estradiol patch since  TAHBSO      Plan:  The patient goes by Iliana. She is here today with her daughter who is imaging manager at [employer].    We reviewed her imaging, imaging reports, pathology repors, history and exam together today.  We discussed that atypical hyperplasia, LCIS and family history are the 3 strongest risk factors for the development  of breast cancer outside of a known genetic predisposition. We discussed that for atypical hyperplasia that we recommend excision to ensure no pathologic upgrade and also to remove any remaining abnormal cells. We discussed that for a 5 year risk > 1.7, that we recommend consultation with medical oncology about risks and benefits of hormonal blockade. We discussed that for a risk (using a model that incorporates detailed family history) > 20% that we recommend MRI for surveillance.   Based on the above, I have recommended the followin- RIGHT breast ANALI guided excision atypical hyperplasia (discussed risks of bleeding, altered nipple sensation, infection, and alternatives of not excising)  2- preop MRI breast  3- postop consultation with medical oncology and high risk clinic    Note PONV, will discuss TIVA with anesthesia and prescribe zofran postop.  Her next routine mammogram is due 1-10-24 at [facility].          Alissa Heda MD    Today I spent 45 minutes doing the following: Reviewing records, labs, outside imaging and reports in  preparation for the patient visit; obtaining medical history; performing the physical exam; counseling and educating the patient and any available family or caregivers; ordering medications, tests or procedures; coordinating care with any other physicians on her care team as needed, and documenting all of the above in the medical record as well as sending communications with her other healthcare professionals.    Next Appointment:  Return for surgery.      EMR Dragon/transcription disclaimer:    Please note that portions of this note were completed with a voice recognition program.

## 2023-08-10 NOTE — OP NOTE
Operative note    Preoperative Diagnosis: Atypical Hyperplasia, Ductal    Postoperative Diagnosis:Atypical Hyperplasia, Ductal    Procedure Performed:right breast JAZLYN localized excisional biopsy    Dictating physician and surgeon: Alissa Head MD    EBL:<5cc    FINDINGS AND DESCRIPTION OF PROCEDURE:     The patient was brought to the operating room and placed on the table in the supine position. After adequate TIVA anesthesia was obtained, we sterilely prepped and draped the breast.  We did a time out to identify correct patient and correct operative site.  She did receive IV antibiotic within an hour of and prior to incision.     I used the JAZLYN handpiece for audible feedback for localization.  I marked the intended area for resection and planned my incision based on the imaging and cristiana.     I used a periareolar incision medially RIGHT breast no skin removal for exposure.    I used a 15 blade to incise the skin. I then dissected using Bovie electrocautery superiorly, inferiorly, medially and laterally around the lesion.  I examined the specimen using the intraoperative Faxitron to document the presence of the lesion within the specimen.  Bowtie marker and jazlyn in specimen along with biopsy site changes grossly.     I then passed the specimen and labeled  as follows: For the excisional biopsy specimen,  long stitch lateral short stitch superior, double stitch deep.     I then irrigated, assured hemostasis.  I then mobilized the breast posteriorly and closed a deep layer of breast tissue using a running 2-0 Vicryl, followed by a superficial layer of breast using interrupted 2-0 Vicryl. I then closed the skin in 2 layers- the first being an interrupted 3-0 Vicryl layer, followed by a running 4-0 Monocryl.    I then applied lengthwise 1/2 inch Steri-Strips, an island dressing.    Then I wrapped her in 4x4s and a 6 inch ACE wrap.    She tolerated the procedure well, there were no immediate complications, and  all counts were correct at the end of the case.

## 2023-08-15 LAB
LAB AP CASE REPORT: NORMAL
LAB AP CLINICAL INFORMATION: NORMAL
PATH REPORT.FINAL DX SPEC: NORMAL
PATH REPORT.GROSS SPEC: NORMAL

## 2023-08-17 ENCOUNTER — TELEPHONE (OUTPATIENT)
Dept: SURGERY | Facility: CLINIC | Age: 58
End: 2023-08-17
Payer: COMMERCIAL

## 2023-08-17 NOTE — TELEPHONE ENCOUNTER
Pathology from RIGHT breast ANALI guided excision of atypical hyperplasia returned as :  FCC, biopsy site changes, no atypia or malignancy.     We will let her know      Final Diagnosis   1. Right Breast, Oriented Excisional Biopsy:                A. Fibrocystic change, apocrine cysts, adenosis with usual duct hyperplasia and cautery artifact.               B. Biopsy site changes are identified and one metallic clip retrieved.               C. No atypical hyperplasia, carcinoma in-situ nor invasive carcinoma identified.               D. Surgical margins appear viable and are negative for neoplasm/malignancy.     donovan/vesta

## 2023-08-21 ENCOUNTER — TELEPHONE (OUTPATIENT)
Dept: SURGERY | Facility: CLINIC | Age: 58
End: 2023-08-21
Payer: COMMERCIAL

## 2023-08-23 ENCOUNTER — OFFICE VISIT (OUTPATIENT)
Dept: SURGERY | Facility: CLINIC | Age: 58
End: 2023-08-23
Payer: COMMERCIAL

## 2023-08-23 VITALS
BODY MASS INDEX: 19.66 KG/M2 | SYSTOLIC BLOOD PRESSURE: 110 MMHG | HEIGHT: 65 IN | OXYGEN SATURATION: 99 % | DIASTOLIC BLOOD PRESSURE: 62 MMHG | WEIGHT: 118 LBS | HEART RATE: 73 BPM

## 2023-08-23 DIAGNOSIS — Z79.890 HORMONE REPLACEMENT THERAPY (POSTMENOPAUSAL): ICD-10-CM

## 2023-08-23 DIAGNOSIS — N63.14 MASS OF LOWER INNER QUADRANT OF RIGHT BREAST: ICD-10-CM

## 2023-08-23 DIAGNOSIS — Z98.890 PONV (POSTOPERATIVE NAUSEA AND VOMITING): ICD-10-CM

## 2023-08-23 DIAGNOSIS — R11.2 PONV (POSTOPERATIVE NAUSEA AND VOMITING): ICD-10-CM

## 2023-08-23 DIAGNOSIS — N60.91 ATYPICAL DUCTAL HYPERPLASIA OF RIGHT BREAST: Primary | ICD-10-CM

## 2023-08-23 DIAGNOSIS — R92.8 ABNORMAL ULTRASOUND OF BREAST: ICD-10-CM

## 2023-08-23 PROCEDURE — 99024 POSTOP FOLLOW-UP VISIT: CPT | Performed by: SURGERY

## 2023-08-23 NOTE — PROGRESS NOTES
Chief Complaint: Iliana Reyes is a 57 y.o. female who was seen in consultation at the request of Miguel Carvajal MD  for abnormal breast imaging and a postoperative visit    History of Present Illness:  Patient presents with breast mass and abnormal breast imaging. She noted  A RIGHT breast mass in  after a normal mammogram in .  She noted no other new masses, skin changes, nipple discharge, nipple changes prior to her most recent imaging.  Her most recent imaging includes the followin/10/2021, Bilateral Screening MMG w/[facility] ([facility])  Heterogeneously dense.  BI-RADS 1: Negative    2023, Bilateral Screening MM[facility] w/[facility] ([facility])  Heterogeneously dense.  BI-RADS 1: Negative    2023, MMG Diagnostic Right w/CAD, US Breast Right Limited (BHL)  Heterogeneously dense.  There is a marker overlying the inner central anterior right breast marking the area of concern indicated by the patient. No suspicious focal mammographic abnormalities.  ULTRASOUND:  Targeted sonographic evaluation of the right breast was performed in the area of concern indicated by the patient at 3:00, 3 cm from the nipple and 4:00, 1 to 2 cm from the nipple. At 4:00, 2 cm from the nipple, there is approximately 0.6 x 0.5 x 0.5 cm hypoechoic mass like area with indistinct margins, which may reflect focal dense fibroglandular tissue but is suspicious. At 4:00, 1 cm from the nipple, there is a 0.4 cm benign-appearing cyst.  IMPRESSION:  Suspicious 0.6 cm mass like area at 4:00 in the right breast. Recommend further evaluation with ultrasound-guided core needle biopsy.  BI-RADS 4A: Low suspicion for malignancy    She had a biopsy on the following day that showed:   2023, US Guided Breast Biopsy (BHL)  4:00, 2cm from the nipple in the right breast. 10 gauge, 5 core specimens were obtained. A bowtie clip was then deployed. Post-procedural digital mammographic views of the  right breast demonstrate the bowtie clip at the expected location.  IMPRESSION:  Pathology is high risk and concordant.    Final Diagnosis   1. Right Breast at 4:00 o'clock 2 cm from Nipple (Not for Calcification), Core Biopsy:               A.  Rare minute focus of cribriform atypical ductal hyperplasia (ADH) (see comment).               B.  Fibroadenomatoid hyperplasia, focal clustered ductal cysts            She has not had a breast biopsy in the past.  She has had  her uterus and ovaries removed in 2009, is postmenopausal, and has taken estrogen patch since that time.  Her family history includes the following: Has 2 daughters, no sisters, 3 MA,1 PA- no FH breast or ovarian cancer in her family.      Interval History:  Pathology from RIGHT breast ANALI guided excision of atypical hyperplasia returned as :  FCC, biopsy site changes, no atypia or malignancy.           She denies redness, warmth, drainage from incision., Denies significant discomfort.      Review of Systems:  Review of Systems   Constitutional:  Negative for unexpected weight change (2 LB WT LOSS).   All other systems reviewed and are negative.     Past Medical and Surgical History:  Breast Biopsy History:  Patient has had the following breast biopsies:3/29/23 ADH RIGHT BREAST  Breast Cancer HIstory:  Patient does not have a past medical history of breast cancer.  Breast Operations, and year:  NONE   Obstetric/Gynecologic History:  Age menstrual periods began: 14   Patient is postmenopausal due to removal of her uterus and both ovaries in the following year: 2009   Number of pregnancies:2  Number of live births: 2  Number of abortions or miscarriages: 0  Age of delivery of first child: 21  Patient did not breast feed.  Length of time taking birth control pills: NONE   Patient took hormone replacement during the following dates: and Patient is presently taking the following hormone replacement: 13 YRS, CURRENT USING ESTRADIOL PATCH     Past Surgical  History:   Procedure Laterality Date    BREAST BIOPSY      BREAST LUMPECTOMY Right 8/10/2023    Procedure: RIGHT breast ANALI guided excision atypical hyperplasia;  Surgeon: Alissa Head MD;  Location: [facility];  Service: General;  Laterality: Right;    CHOLECYSTECTOMY      Dr. EDER Alba    COLONOSCOPY  2016    polyp, tics, tubular adenoma    COLONOSCOPY  2019    normal ileum, diverticulosis, NBIH, TAx1    ENDOSCOPY  2016    gastric ulcer w/clean base, acute gastritis. Leiva's esophagus    ENDOSCOPY  2019    normal esophagus/duodenum, acute gastritis    HYSTERECTOMY      OOPHORECTOMY      SHOULDER ARTHROSCOPY Left     Dr. Plunkett    UPPER GASTROINTESTINAL ENDOSCOPY  2016     Past Medical History:   Diagnosis Date    Aneurysm     OPTICAL-TODNAM    Atypical hyperplasia of right breast     Leiva esophagus     Chest pain     Gastric ulcer     GERD (gastroesophageal reflux disease)     History of migraine     Pyelonephritis 10/31/2022    Tubular adenoma of colon        Prior Hospitalizations, other than for surgery or childbirth, and year:  NONE     Social History     Socioeconomic History    Marital status:    Tobacco Use    Smoking status: Former     Packs/day: 1.00     Years: 20.00     Pack years: 20.00     Types: Cigarettes     Quit date:      Years since quittin.6    Smokeless tobacco: Never   Vaping Use    Vaping Use: Never used   Substance and Sexual Activity    Alcohol use: Yes     Alcohol/week: 4.0 standard drinks     Types: 4 Glasses of wine per week     Comment: 4-6 PER WK    Drug use: No    Sexual activity: Yes     Partners: Male     Birth control/protection: None     Patient is .  Patient is employed full time with the following occupation: MEDICAL BILLING   Patient drinks 2 servings of caffeine per day.    Family History:  Family History   Problem Relation Age of Onset    Heart disease Mother     Inflammatory bowel disease Mother      "Colon cancer Mother     Colon polyps Mother     Liver cancer Mother     Ulcerative colitis Mother     Cerebral aneurysm Father     Crohn's disease Brother     Colon polyps Brother     Heart attack Brother 48    Heart attack Brother 40    Crohn's disease Cousin     Crohn's disease Cousin     Learning disabilities Neg Hx        Vital Signs:  /62 (BP Location: Right arm, Patient Position: Sitting, Cuff Size: Adult)   Pulse 73   Ht 165.1 cm (65\")   Wt 53.5 kg (118 lb)   LMP  (LMP Unknown) Comment: complete  SpO2 99%   BMI 19.64 kg/mý      Medications:    Current Outpatient Medications:     acetaminophen (TYLENOL) 325 MG tablet, Take 2 tablets by mouth Every 6 (Six) Hours As Needed for Mild Pain., Disp: , Rfl:     Aimovig 140 MG/ML auto-injector, Inject 1 mL under the skin into the appropriate area as directed Every 30 (Thirty) Days., Disp: 1.12 mL, Rfl: 12    Alrex 0.2 % suspension, Administer 1 drop to both eyes 2 (Two) Times a Day., Disp: , Rfl:     Calcium-Magnesium-Vitamin D (CALCIUM 500 PO), Take 1 tablet by mouth Daily., Disp: , Rfl:     cholecalciferol (VITAMIN D3) 25 MCG (1000 UT) tablet, Take 1 tablet by mouth Daily., Disp: , Rfl:     diphenhydrAMINE (BENADRYL) 25 mg, Take 1 capsule by mouth Every 6 (Six) Hours As Needed for Itching or Allergies., Disp: , Rfl:     estradiol (VIVELLE-DOT) 0.025 MG/24HR patch, Place 1 patch on the skin as directed by provider 2 (Two) Times a Week., Disp: 24 patch, Rfl: 3    linaclotide (Linzess) 145 MCG capsule capsule, Take 1 capsule by mouth Every Morning Before Breakfast., Disp: 90 capsule, Rfl: 3    Restasis 0.05 % ophthalmic emulsion, Administer 1 drop to both eyes 2 (Two) Times a Day., Disp: , Rfl:      Allergies:  Allergies   Allergen Reactions    Amitiza [Lubiprostone] Other (See Comments)     Migraines    Codeine Hives and GI Intolerance    Pantoprazole Nausea And Vomiting       Physical Examination:  /62 (BP Location: Right arm, Patient Position: " "Sitting, Cuff Size: Adult)   Pulse 73   Ht 165.1 cm (65\")   Wt 53.5 kg (118 lb)   LMP  (LMP Unknown) Comment: complete  SpO2 99%   BMI 19.64 kg/mý   General Appearance:  Patient is in no distress.  She is well kept and has an thin build.   Psychiatric:  Patient with appropriate mood and affect. Alert and oriented to self, time, and place.    Breast, RIGHT:  small sized, 34B, symmetric with the contralateral side.  Breast skin is without erythema, edema, rashes.  There are no visible abnormalities upon inspection during the arm-raising maneuver or with hands on hips in the sitting position. There is no nipple retraction, discharge or nipple/areolar skin changes. Well healing medial periareolar incision from her  excision atypical hyperplasia.  No erythema,warmth, drainage. There are no other masses palpable in the sitting or supine positions.    Breast, LEFT:  small sized, 34B, symmetric with the contralateral side.  Breast skin is without erythema, edema, rashes.  There are no visible abnormalities upon inspection during the arm-raising maneuver or with hands on hips in the sitting position. There is no nipple retraction, discharge or nipple/areolar skin changes.There are no masses palpable in the sitting or supine positions.    Lymphatic:  There is no axillary, cervical, infraclavicular, or supraclavicular adenopathy bilaterally.  Eyes:  Pupils are round and reactive to light.  Cardiovascular:  Heart rate and rhythm are regular.  Respiratory:  Lungs are clear bilaterally with no crackles or wheezes in any lung field.  Gastrointestinal:  Abdomen is soft, nondistended, and nontender.   Musculoskeletal:  Good strength in all 4 extremities.   There is good range of motion in both shoulders.  Skin:  No new skin lesions or rashes on the skin excluding the breast (see breast exam above).    Imagin/10/2021, Bilateral Screening MMG w/[facility] ([facility])  Heterogeneously dense.  BI-RADS " 1: Negative    01/09/2023, Bilateral Screening [facility] w/[facility] ([facility])  Heterogeneously dense.  BI-RADS 1: Negative    03/02/2023, MMG Diagnostic Right w/CAD, US Breast Right Limited (BHL)  Heterogeneously dense.  There is a marker overlying the inner central anterior right breast marking the area of concern indicated by the patient. No suspicious focal mammographic abnormalities.  ULTRASOUND:  Targeted sonographic evaluation of the right breast was performed in the area of concern indicated by the patient at 3:00, 3 cm from the nipple and 4:00, 1 to 2 cm from the nipple. At 4:00, 2 cm from the nipple, there is approximately 0.6 x 0.5 x 0.5 cm hypoechoic mass like area with indistinct margins, which may reflect focal dense fibroglandular tissue but is suspicious. At 4:00, 1 cm from the nipple, there is a 0.4 cm benign-appearing cyst.  IMPRESSION:  Suspicious 0.6 cm mass like area at 4:00 in the right breast. Recommend further evaluation with ultrasound-guided core needle biopsy.  BI-RADS 4A: Low suspicion for malignancy    Bilateral breast MRI [facility] June 28, 2023.  4:00 middle third right breast 2-1/2 cm from the nipple, 1.2 cm enhancement with a biopsy clip at the site of atypia.  Normal axilla.  No findings on the left.    Pathology:  03/29/2023, US Guided Breast Biopsy ([facility])  4:00, 2cm from the nipple in the right breast. 10 gauge, 5 core specimens were obtained. A bowtie clip was then deployed. Post-procedural digital mammographic views of the right breast demonstrate the bowtie clip at the expected location.  IMPRESSION:  Pathology is high risk and concordant.    Final Diagnosis   1. Right Breast at 4:00 o'clock 2 cm from Nipple (Not for Calcification), Core Biopsy:               A.  Rare minute focus of cribriform atypical ductal hyperplasia (ADH) (see comment).               B.  Fibroadenomatoid hyperplasia, focal clustered ductal cysts      Pathology from RIGHT breast ANALI  guided excision of atypical hyperplasia returned as :  FCC, biopsy site changes, no atypia or malignancy.       Final Diagnosis   1. Right Breast, Oriented Excisional Biopsy:                A. Fibrocystic change, apocrine cysts, adenosis with usual duct hyperplasia and cautery artifact.               B. Biopsy site changes are identified and one metallic clip retrieved.               C. No atypical hyperplasia, carcinoma in-situ nor invasive carcinoma identified.               D. Surgical margins appear viable and are negative for neoplasm/malignancy.     jat/pkm        Procedures:    Assessment:   Diagnosis Plan   1. Atypical ductal hyperplasia of right breast  Ambulatory Referral to Hematology / Oncology      2. Mass of lower inner quadrant of right breast        3. PONV (postoperative nausea and vomiting)        4. Abnormal ultrasound of breast        5. Hormone replacement therapy (postmenopausal)          1-2,4  RIGHT breast 4:00 2 CFN, just outside areolar margin. 6mm dense tissue on ultrasound at site of palpable  bowtie marker- core biopsy 3-2023-   Rare focus cribiform ADH, FA hyperplasia, clustered ductal cysts      Pathology from RIGHT breast ANALI guided excision of atypical hyperplasia returned as :  FCC, biopsy site changes, no atypia or malignancy.             3-  Severe PONV    5-   Estradiol patch since 2009 TAHBSO      Plan:  The patient goes by Iliana. (Her daughter  is imaging manager at [employer]).  She is here today for her postop visit. We reviewed her pathology report.  She is healing nicely.      Previously, we discussed that atypical hyperplasia, LCIS and family history are the 3 strongest risk factors for the development  of breast cancer outside of a known genetic predisposition. I will arrange for a  postop consultation with medical oncology and high risk clinic    I will arrange for her to see medical oncology and Nav Snell in the high risk clinic.   Her next mammogram is due 1-10-24 at  [facility].    Alissa Head MD    Next Appointment:  Return for Next scheduled follow up, with Vishnu Snell.      EMR Dragon/transcription disclaimer:    Please note that portions of this note were completed with a voice recognition program.                 36.7

## 2023-09-22 ENCOUNTER — CONSULT (OUTPATIENT)
Dept: ONCOLOGY | Facility: CLINIC | Age: 58
End: 2023-09-22
Payer: COMMERCIAL

## 2023-09-22 ENCOUNTER — LAB (OUTPATIENT)
Dept: LAB | Facility: HOSPITAL | Age: 58
End: 2023-09-22
Payer: COMMERCIAL

## 2023-09-22 VITALS
SYSTOLIC BLOOD PRESSURE: 116 MMHG | WEIGHT: 116.4 LBS | DIASTOLIC BLOOD PRESSURE: 75 MMHG | BODY MASS INDEX: 19.39 KG/M2 | HEIGHT: 65 IN | OXYGEN SATURATION: 100 % | TEMPERATURE: 97.8 F | HEART RATE: 65 BPM | RESPIRATION RATE: 18 BRPM

## 2023-09-22 DIAGNOSIS — N60.91 ATYPICAL DUCTAL HYPERPLASIA OF RIGHT BREAST: Primary | ICD-10-CM

## 2023-09-22 LAB
BASOPHILS # BLD AUTO: 0.03 10*3/MM3 (ref 0–0.2)
BASOPHILS NFR BLD AUTO: 0.4 % (ref 0–1.5)
DEPRECATED RDW RBC AUTO: 39.9 FL (ref 37–54)
EOSINOPHIL # BLD AUTO: 0.09 10*3/MM3 (ref 0–0.4)
EOSINOPHIL NFR BLD AUTO: 1.2 % (ref 0.3–6.2)
ERYTHROCYTE [DISTWIDTH] IN BLOOD BY AUTOMATED COUNT: 11.8 % (ref 12.3–15.4)
HCT VFR BLD AUTO: 41.8 % (ref 34–46.6)
HGB BLD-MCNC: 14 G/DL (ref 12–15.9)
IMM GRANULOCYTES # BLD AUTO: 0.16 10*3/MM3 (ref 0–0.05)
IMM GRANULOCYTES NFR BLD AUTO: 2.2 % (ref 0–0.5)
LYMPHOCYTES # BLD AUTO: 2.52 10*3/MM3 (ref 0.7–3.1)
LYMPHOCYTES NFR BLD AUTO: 34.3 % (ref 19.6–45.3)
MCH RBC QN AUTO: 30.9 PG (ref 26.6–33)
MCHC RBC AUTO-ENTMCNC: 33.5 G/DL (ref 31.5–35.7)
MCV RBC AUTO: 92.3 FL (ref 79–97)
MONOCYTES # BLD AUTO: 0.72 10*3/MM3 (ref 0.1–0.9)
MONOCYTES NFR BLD AUTO: 9.8 % (ref 5–12)
NEUTROPHILS NFR BLD AUTO: 3.82 10*3/MM3 (ref 1.7–7)
NEUTROPHILS NFR BLD AUTO: 52.1 % (ref 42.7–76)
NRBC BLD AUTO-RTO: 0.3 /100 WBC (ref 0–0.2)
PLATELET # BLD AUTO: 193 10*3/MM3 (ref 140–450)
PMV BLD AUTO: 12.4 FL (ref 6–12)
RBC # BLD AUTO: 4.53 10*6/MM3 (ref 3.77–5.28)
WBC NRBC COR # BLD: 7.34 10*3/MM3 (ref 3.4–10.8)

## 2023-09-22 PROCEDURE — 36415 COLL VENOUS BLD VENIPUNCTURE: CPT

## 2023-09-22 PROCEDURE — 85025 COMPLETE CBC W/AUTO DIFF WBC: CPT

## 2023-09-22 NOTE — PROGRESS NOTES
Subjective   Iliana Reyes is a 57 y.o. female.  Referred by Dr. Head for right breast atypical ductal hyperplasia, focal    History of Present Illness   Ms. Reyes is a 57-year-old postmenopausal  lady who has been on hormone replacement therapy with estradiol patch for 15 years presents with a palpable abnormality of the right breast.    1/9/2023-bilateral screening mammogram benign.    3/2/2023-right breast diagnostic mammogram after palpating a right breast mass for about 6 to 8 weeks.  No suspicious focal mammographic abnormalities are identified deep to the marker.  No suspicious masses, calcifications or areas of architectural distortion    Ultrasound  At 3:00, 3 cm from the nipple and 4:00, 1 to 2 cm from the nipple ultrasound was performed.  At 4:00, 2 cm from the nipple there is approximately 0.6 time 0.5 x 0.5 cm masslike area with indistinct margins which may reflect focal dense fibroglandular tissue but is suspicious.  At 4:00, 1 cm from the nipple there is a 0.4 cm benign-appearing cyst.    Impression  Suspicious 0.6 cm masslike area at 4:00 in the right breast.  Ultrasound-guided core needle biopsy recommended.    3/29/2023-ultrasound-guided core needle biopsy  Pathology consistent with rare minute focus of cribriform atypical ductal hyperplasia.  Fibroadenomatoid hyperplasia, focally clustered ductal cyst.  No carcinoma in situ.    8/10/2023-right breast lumpectomy  Fibrocystic change, apocrine cyst, adenosis and usual ductal hyperplasia and cautery artifact.  No residual atypical hyperplasia carcinoma in situ or invasive carcinoma.    Bilateral breast MRI 6/28/2023-non-mass enhancement measuring 1.2 cm at 4:00 middle right breast with a focus of susceptibility from biopsy clip marking the site of biopsy-proven atypia.  Recommend surgical management  No MRI evidence of malignancy in the left breast.    Patient presents today to discuss risk reduction.  She is recovering well from  surgery but reports some soreness at the site of surgery.    Denies any family history of breast or ovarian carcinoma.    The following portions of the patient's history were reviewed and updated as appropriate: allergies, current medications, past family history, past medical history, past social history, past surgical history, and problem list.    Past Medical History:   Diagnosis Date    Aneurysm     OPTICAL-TODNAM    Atypical hyperplasia of right breast     Leiva esophagus     Chest pain 2016    Gastric ulcer     GERD (gastroesophageal reflux disease)     History of migraine     Pyelonephritis 10/31/2022    Tubular adenoma of colon         Past Surgical History:   Procedure Laterality Date    BREAST BIOPSY      BREAST LUMPECTOMY Right 8/10/2023    Procedure: RIGHT breast ANALI guided excision atypical hyperplasia;  Surgeon: Alissa Head MD;  Location: [facility];  Service: General;  Laterality: Right;    CHOLECYSTECTOMY      Dr. EDER Alba    COLONOSCOPY  05/18/2016    polyp, tics, tubular adenoma    COLONOSCOPY  06/05/2019    normal ileum, diverticulosis, NBIH, TAx1    ENDOSCOPY  05/18/2016    gastric ulcer w/clean base, acute gastritis. Leiva's esophagus    ENDOSCOPY  06/05/2019    normal esophagus/duodenum, acute gastritis    HYSTERECTOMY      OOPHORECTOMY      SHOULDER ARTHROSCOPY Left     Dr. Plunkett    UPPER GASTROINTESTINAL ENDOSCOPY  05/18/2016        Family History   Problem Relation Age of Onset    Heart disease Mother     Inflammatory bowel disease Mother     Colon cancer Mother     Colon polyps Mother     Liver cancer Mother     Ulcerative colitis Mother     Cerebral aneurysm Father     Crohn's disease Brother     Colon polyps Brother     Heart attack Brother 48    Heart attack Brother 40    Crohn's disease Cousin     Crohn's disease Cousin     Learning disabilities Neg Hx         Social History     Socioeconomic History    Marital status:    Tobacco Use    Smoking status: Former  "    Packs/day: 1.00     Years: 20.00     Pack years: 20.00     Types: Cigarettes     Quit date:      Years since quittin.7    Smokeless tobacco: Never   Vaping Use    Vaping Use: Never used   Substance and Sexual Activity    Alcohol use: Yes     Alcohol/week: 4.0 standard drinks     Types: 4 Glasses of wine per week     Comment: 4-6 PER WK    Drug use: No    Sexual activity: Yes     Partners: Male     Birth control/protection: None        OB History          2    Para   2    Term   2            AB        Living             SAB        IAB        Ectopic        Molar        Multiple        Live Births   2             Age at menarche-14  Age at first live childbirth-21   2 para 2  0  Underwent a hysterectomy and salpingo-oophorectomy at the age of 42 for abnormal Pap smears.  Hormone replacement therapy-15 years with estradiol patch    Allergies   Allergen Reactions    Amitiza [Lubiprostone] Other (See Comments)     Migraines    Codeine Hives and GI Intolerance    Pantoprazole Nausea And Vomiting            Review of Systems   Constitutional: Negative.    HENT: Negative.     Eyes: Negative.    Respiratory: Negative.     Cardiovascular: Negative.    Gastrointestinal: Negative.    Endocrine: Negative.    Genitourinary: Negative.    Musculoskeletal: Negative.    Skin: Negative.    Allergic/Immunologic: Negative.    Neurological: Negative.    Hematological: Negative.    Psychiatric/Behavioral: Negative.         Objective   Temperature 97.8 °F (36.6 °C), temperature source Temporal, resp. rate 18, height 165.1 cm (65\"), weight 52.8 kg (116 lb 6.4 oz), not currently breastfeeding.   Physical Exam  Vitals reviewed.   Constitutional:       Appearance: Normal appearance.   HENT:      Head: Normocephalic and atraumatic.      Right Ear: External ear normal.      Left Ear: External ear normal.      Nose: Nose normal.      Mouth/Throat:      Pharynx: Oropharynx is clear.   Eyes:      " Conjunctiva/sclera: Conjunctivae normal.   Cardiovascular:      Rate and Rhythm: Normal rate.   Pulmonary:      Effort: Pulmonary effort is normal.   Abdominal:      General: Abdomen is flat.   Musculoskeletal:         General: Normal range of motion.   Skin:     General: Skin is warm.   Neurological:      Mental Status: She is alert.   Psychiatric:         Mood and Affect: Mood normal.         Behavior: Behavior normal.         Thought Content: Thought content normal.         Judgment: Judgment normal.     Breast Exam: Right breast on inspection there is a scar in the periareolar region which is healing well.  Tender to palpation.  Left breast appears normal inspection.  No palpable abnormalities of the left breast    Lab on 09/22/2023   Component Date Value Ref Range Status    WBC 09/22/2023 7.34  3.40 - 10.80 10*3/mm3 Final    RBC 09/22/2023 4.53  3.77 - 5.28 10*6/mm3 Final    Hemoglobin 09/22/2023 14.0  12.0 - 15.9 g/dL Final    Hematocrit 09/22/2023 41.8  34.0 - 46.6 % Final    MCV 09/22/2023 92.3  79.0 - 97.0 fL Final    MCH 09/22/2023 30.9  26.6 - 33.0 pg Final    MCHC 09/22/2023 33.5  31.5 - 35.7 g/dL Final    RDW 09/22/2023 11.8 (L)  12.3 - 15.4 % Final    RDW-SD 09/22/2023 39.9  37.0 - 54.0 fl Final    MPV 09/22/2023 12.4 (H)  6.0 - 12.0 fL Final    Platelets 09/22/2023 193  140 - 450 10*3/mm3 Final    Neutrophil % 09/22/2023 52.1  42.7 - 76.0 % Final    Lymphocyte % 09/22/2023 34.3  19.6 - 45.3 % Final    Monocyte % 09/22/2023 9.8  5.0 - 12.0 % Final    Eosinophil % 09/22/2023 1.2  0.3 - 6.2 % Final    Basophil % 09/22/2023 0.4  0.0 - 1.5 % Final    Immature Grans % 09/22/2023 2.2 (H)  0.0 - 0.5 % Final    Neutrophils, Absolute 09/22/2023 3.82  1.70 - 7.00 10*3/mm3 Final    Lymphocytes, Absolute 09/22/2023 2.52  0.70 - 3.10 10*3/mm3 Final    Monocytes, Absolute 09/22/2023 0.72  0.10 - 0.90 10*3/mm3 Final    Eosinophils, Absolute 09/22/2023 0.09  0.00 - 0.40 10*3/mm3 Final    Basophils, Absolute 09/22/2023  0.03  0.00 - 0.20 10*3/mm3 Final    Immature Grans, Absolute 09/22/2023 0.16 (H)  0.00 - 0.05 10*3/mm3 Final    nRBC 09/22/2023 0.3 (H)  0.0 - 0.2 /100 WBC Final        No radiology results for the last 30 days.       Assessment & Plan       *Focal atypical ductal hyperplasia  Discussed increased lifetime risk of breast cancer associated with ADH.  Given that the ADH is focal risk is likely on the low side.  20-year is likely around 24%.  She is currently on estradiol patch.  Recommend that she wean off of the estradiol patch.  Discussed increased risk of breast cancer not only in the ipsilateral breast but also of the contralateral breast.    *Hormone replacement therapy  Patient has been on estradiol patch for almost 15 years.  Recommend that she wean herself off of the patch    *Risk reduction  We discussed low-dose tamoxifen, 5 mg daily to help decrease the risk of breast cancer.  However I would not like to start this prior to her coming off of the hormone replacement therapy.  We discussed about Effexor and gabapentin to help with hot flashes if necessary.    *Surveillance  Continue annual mammograms and MRIs  Mammogram due January 2024, MRI due July 2024    *Follow-up-1 month for a video visit to discuss symptoms after stopping hormone replacement therapy and if she is willing to proceed with tamoxifen.    63 minutes total spent on the encounter including reviewing the medical records, face-to-face time, documentation of the same

## 2023-09-25 ENCOUNTER — PATIENT ROUNDING (BHMG ONLY) (OUTPATIENT)
Dept: ONCOLOGY | Facility: CLINIC | Age: 58
End: 2023-09-25

## 2023-09-25 NOTE — PROGRESS NOTES
A My-Chart message has been sent to the patient for PATIENT ROUNDING with [facility].

## 2023-10-25 PROBLEM — R87.810 ASCUS WITH POSITIVE HIGH RISK HPV CERVICAL: Status: RESOLVED | Noted: 2018-04-24 | Resolved: 2023-10-25

## 2023-10-25 PROBLEM — D72.829 LEUKOCYTOSIS, UNSPECIFIED TYPE: Status: RESOLVED | Noted: 2022-10-31 | Resolved: 2023-10-25

## 2023-10-25 PROBLEM — R87.610 ASCUS WITH POSITIVE HIGH RISK HPV CERVICAL: Status: RESOLVED | Noted: 2018-04-24 | Resolved: 2023-10-25

## 2023-10-25 NOTE — PROGRESS NOTES
Subjective   Chief Complaint   Patient presents with    Annual Exam       History of Present Illness   57 y.o. female presents for annual physical. She is feeling good without complaints.     P/P scheduled with Dr. Ren. Followed by Dr. Head and Dr. Guillen regarding atypical focal hyperplasia right breast; annual MMG and MRI breast. Weaned off estradiol patch with plans to start Tamoxifen. Hot flashes were very bad; she has since started Effexor which has helped.     CLS with Dr. Cole with plans to repeat in June 2024 given tubular adenoma.     Cerebral aneurysm followed by Dr. Nieto. She plans on seeing neurologist (advised by Kenneth and Maris) at , Dr. Topete. She is scheduled regarding her eyes. There s concern the aneurysm is pushing on the nerve.      Declines influenza vaccination. She will consider SG and PVR-20.      Patient Active Problem List   Diagnosis    Cerebral aneurysm without rupture    History of migraine    Irritable bowel syndrome with constipation    Dry eyes    Abnormal CT scan, kidney    Atypical ductal hyperplasia of right breast       Allergies   Allergen Reactions    Amitiza [Lubiprostone] Other (See Comments)     Migraines    Codeine Hives and GI Intolerance    Pantoprazole Nausea And Vomiting       Current Outpatient Medications on File Prior to Visit   Medication Sig Dispense Refill    acetaminophen (TYLENOL) 325 MG tablet Take 2 tablets by mouth Every 6 (Six) Hours As Needed for Mild Pain.      Aimovig 140 MG/ML auto-injector Inject 1 mL under the skin into the appropriate area as directed Every 30 (Thirty) Days. 1.12 mL 12    Calcium-Magnesium-Vitamin D (CALCIUM 500 PO) Take 1 tablet by mouth Daily.      cholecalciferol (VITAMIN D3) 25 MCG (1000 UT) tablet Take 1 tablet by mouth Daily.      diphenhydrAMINE (BENADRYL) 25 mg Take 1 capsule by mouth Every 6 (Six) Hours As Needed for Itching or Allergies.      linaclotide (Linzess) 145 MCG capsule capsule Take 1 capsule by  mouth Every Morning Before Breakfast. 90 capsule 3    Restasis 0.05 % ophthalmic emulsion Administer 1 drop to both eyes 2 (Two) Times a Day.      venlafaxine XR (Effexor XR) 37.5 MG 24 hr capsule Take 1 capsule by mouth Daily. 30 capsule 3    [DISCONTINUED] Alrex 0.2 % suspension Administer 1 drop to both eyes 2 (Two) Times a Day. (Patient not taking: Reported on 2023)      [DISCONTINUED] estradiol (VIVELLE-DOT) 0.025 MG/24HR patch Place 1 patch on the skin as directed by provider 2 (Two) Times a Week. (Patient not taking: Reported on 2023) 24 patch 3     No current facility-administered medications on file prior to visit.       Past Medical History:   Diagnosis Date    Aneurysm     OPTICAL-TODNAM    Leiva esophagus     Chest pain     Gastric ulcer     GERD (gastroesophageal reflux disease)     History of migraine     Pyelonephritis 10/31/2022    Tubular adenoma of colon        Family History   Problem Relation Age of Onset    Heart disease Mother     Inflammatory bowel disease Mother     Colon cancer Mother     Colon polyps Mother     Liver cancer Mother     Ulcerative colitis Mother     Cerebral aneurysm Father     Crohn's disease Brother     Colon polyps Brother     Heart attack Brother 48    Heart attack Brother 40    Crohn's disease Cousin     Crohn's disease Cousin     Learning disabilities Neg Hx        Social History     Socioeconomic History    Marital status:    Tobacco Use    Smoking status: Former     Packs/day: 1.00     Years: 20.00     Additional pack years: 0.00     Total pack years: 20.00     Types: Cigarettes     Quit date:      Years since quittin.8    Smokeless tobacco: Never   Vaping Use    Vaping Use: Never used   Substance and Sexual Activity    Alcohol use: Yes     Alcohol/week: 4.0 standard drinks of alcohol     Types: 4 Glasses of wine per week     Comment: 4-6 PER WK    Drug use: No    Sexual activity: Yes     Partners: Male     Birth control/protection: None  "      Past Surgical History:   Procedure Laterality Date    BREAST BIOPSY      BREAST LUMPECTOMY Right 8/10/2023    Procedure: RIGHT breast ANALI guided excision atypical hyperplasia;  Surgeon: Alissa Head MD;  Location: [facility];  Service: General;  Laterality: Right;    CHOLECYSTECTOMY      Dr. EDER Alba    COLONOSCOPY  05/18/2016    polyp, tics, tubular adenoma    COLONOSCOPY  06/05/2019    normal ileum, diverticulosis, NBIH, TAx1    ENDOSCOPY  05/18/2016    gastric ulcer w/clean base, acute gastritis. Leiva's esophagus    ENDOSCOPY  06/05/2019    normal esophagus/duodenum, acute gastritis    HYSTERECTOMY      OOPHORECTOMY      SHOULDER ARTHROSCOPY Left     Dr. Plunkett    UPPER GASTROINTESTINAL ENDOSCOPY  05/18/2016       The following portions of the patient's history were reviewed and updated as appropriate: problem list, allergies, current medications, past medical history, past family history, past social history, and past surgical history.    Review of Systems    Immunization History   Administered Date(s) Administered    COVID-19 (PFIZER) Purple Cap Monovalent 03/27/2021, 04/19/2021, 12/07/2021    Flu Vaccine Intradermal Quad 18-64YR 04/21/2022    Flu Vaccine Quad PF >36MO 10/14/2020    Flublok 18+yrs 04/21/2022    Fluzone (or Fluarix & Flulaval for VFC) >6mos 10/14/2020, 10/14/2021, 10/27/2022    Hepatitis A 05/05/2018    Influenza Injectable Mdck Pf Quad 10/27/2022    Influenza Quad Vaccine (Inpatient) 09/28/2019    Pneumococcal Polysaccharide (PPSV23) 02/21/2022    Tdap 01/23/2020       Objective   Vitals:    11/02/23 0756   BP: 110/72   Pulse: 72   Resp: 12   Temp: 98.7 °F (37.1 °C)   Weight: 53.5 kg (118 lb)   Height: 165.1 cm (65\")     Body mass index is 19.64 kg/m².  Physical Exam  Vitals reviewed.   Constitutional:       Appearance: Normal appearance. She is well-developed.   HENT:      Head: Normocephalic and atraumatic.      Right Ear: Tympanic membrane, ear canal and external ear " normal.      Left Ear: Tympanic membrane, ear canal and external ear normal.      Nose: Nose normal.      Mouth/Throat:      Mouth: Mucous membranes are moist.      Pharynx: Oropharynx is clear. No oropharyngeal exudate or posterior oropharyngeal erythema.   Eyes:      General: No scleral icterus.     Extraocular Movements: Extraocular movements intact.      Conjunctiva/sclera: Conjunctivae normal.      Pupils: Pupils are equal, round, and reactive to light.   Neck:      Thyroid: No thyromegaly.      Vascular: No carotid bruit.   Cardiovascular:      Rate and Rhythm: Normal rate and regular rhythm.      Heart sounds: Normal heart sounds. No murmur heard.  Pulmonary:      Effort: Pulmonary effort is normal.      Breath sounds: Normal breath sounds.   Abdominal:      General: Bowel sounds are normal. There is no distension.      Palpations: Abdomen is soft.      Tenderness: There is no abdominal tenderness.   Musculoskeletal:      Cervical back: Neck supple.      Comments: Ambulates without assistance; no clubbing, cyanosis or edema.    Lymphadenopathy:      Cervical: No cervical adenopathy.   Skin:     General: Skin is warm and dry.   Neurological:      Mental Status: She is alert.   Psychiatric:         Mood and Affect: Mood normal.         Behavior: Behavior normal.         Procedures    Assessment & Plan   Diagnoses and all orders for this visit:    1. Annual physical exam (Primary)  Comments:  150 minutes weekly aerobic exercise advised. Declines flu shot. SGand PVR-20 advised. [facility] recall June 2024. P/P with Dr Ren 1/2024.  Orders:  -     Comprehensive Metabolic Panel; Future  -     Lipid Panel; Future    2. Cerebral aneurysm without rupture  Comments:  Followed by Dr. Nieto with [facility] head annually. Scheduled with Dr. Topete at  given concern cerebral aneurysm may be pushing on her optic nerve.    3. Migraine without status migrainosus, not intractable, unspecified migraine type  Comments:  Much improved  with Aimovig; headache burden decreased by 75%. .    4. Screening for lung cancer  -      CT Chest Low Dose Cancer Screening WO    Records reviewed include previous OV with myself as well as labs.     Return in about 1 year (around 11/2/2024) for Lab B4 FUP.

## 2023-10-27 ENCOUNTER — TELEMEDICINE (OUTPATIENT)
Dept: ONCOLOGY | Facility: CLINIC | Age: 58
End: 2023-10-27
Payer: COMMERCIAL

## 2023-10-27 DIAGNOSIS — N60.91 ATYPICAL DUCTAL HYPERPLASIA OF RIGHT BREAST: Primary | ICD-10-CM

## 2023-10-27 RX ORDER — VENLAFAXINE HYDROCHLORIDE 37.5 MG/1
37.5 CAPSULE, EXTENDED RELEASE ORAL DAILY
Qty: 30 CAPSULE | Refills: 3 | Status: SHIPPED | OUTPATIENT
Start: 2023-10-27

## 2023-10-27 NOTE — PROGRESS NOTES
Subjective   Iliana Reyes is a 57 y.o. female.  Referred by Dr. Head for right breast atypical ductal hyperplasia, focal    History of Present Illness   Ms. Reyes is a 57-year-old postmenopausal  lady who has been on hormone replacement therapy with estradiol patch for 15 years presents with a palpable abnormality of the right breast.    1/9/2023-bilateral screening mammogram benign.    3/2/2023-right breast diagnostic mammogram after palpating a right breast mass for about 6 to 8 weeks.  No suspicious focal mammographic abnormalities are identified deep to the marker.  No suspicious masses, calcifications or areas of architectural distortion    Ultrasound  At 3:00, 3 cm from the nipple and 4:00, 1 to 2 cm from the nipple ultrasound was performed.  At 4:00, 2 cm from the nipple there is approximately 0.6 time 0.5 x 0.5 cm masslike area with indistinct margins which may reflect focal dense fibroglandular tissue but is suspicious.  At 4:00, 1 cm from the nipple there is a 0.4 cm benign-appearing cyst.    Impression  Suspicious 0.6 cm masslike area at 4:00 in the right breast.  Ultrasound-guided core needle biopsy recommended.    3/29/2023-ultrasound-guided core needle biopsy  Pathology consistent with rare minute focus of cribriform atypical ductal hyperplasia.  Fibroadenomatoid hyperplasia, focally clustered ductal cyst.  No carcinoma in situ.    8/10/2023-right breast lumpectomy  Fibrocystic change, apocrine cyst, adenosis and usual ductal hyperplasia and cautery artifact.  No residual atypical hyperplasia carcinoma in situ or invasive carcinoma.    Bilateral breast MRI 6/28/2023-non-mass enhancement measuring 1.2 cm at 4:00 middle right breast with a focus of susceptibility from biopsy clip marking the site of biopsy-proven atypia.  Recommend surgical management  No MRI evidence of malignancy in the left breast.    Interval history  Ms. Reyes presents today for a video visit to discuss starting  tamoxifen.  She has weaned herself off of the hormone replacement therapy and since then she has had significant insomnia and hot flashes.  She is also complaining of pain in the right lower quadrant of the breast since surgery.    The following portions of the patient's history were reviewed and updated as appropriate: allergies, current medications, past family history, past medical history, past social history, past surgical history, and problem list.    Past Medical History:   Diagnosis Date    Aneurysm     OPTICAL-TODNAM    Leiva esophagus     Chest pain 2016    Gastric ulcer     GERD (gastroesophageal reflux disease)     History of migraine     Pyelonephritis 10/31/2022    Tubular adenoma of colon         Past Surgical History:   Procedure Laterality Date    BREAST BIOPSY      BREAST LUMPECTOMY Right 8/10/2023    Procedure: RIGHT breast ANALI guided excision atypical hyperplasia;  Surgeon: Alissa Head MD;  Location: [facility];  Service: General;  Laterality: Right;    CHOLECYSTECTOMY      Dr. EDER Alba    COLONOSCOPY  05/18/2016    polyp, tics, tubular adenoma    COLONOSCOPY  06/05/2019    normal ileum, diverticulosis, NBIH, TAx1    ENDOSCOPY  05/18/2016    gastric ulcer w/clean base, acute gastritis. Leiva's esophagus    ENDOSCOPY  06/05/2019    normal esophagus/duodenum, acute gastritis    HYSTERECTOMY      OOPHORECTOMY      SHOULDER ARTHROSCOPY Left     Dr. Plunkett    UPPER GASTROINTESTINAL ENDOSCOPY  05/18/2016        Family History   Problem Relation Age of Onset    Heart disease Mother     Inflammatory bowel disease Mother     Colon cancer Mother     Colon polyps Mother     Liver cancer Mother     Ulcerative colitis Mother     Cerebral aneurysm Father     Crohn's disease Brother     Colon polyps Brother     Heart attack Brother 48    Heart attack Brother 40    Crohn's disease Cousin     Crohn's disease Cousin     Learning disabilities Neg Hx         Social History     Socioeconomic History     Marital status:    Tobacco Use    Smoking status: Former     Packs/day: 1.00     Years: 20.00     Additional pack years: 0.00     Total pack years: 20.00     Types: Cigarettes     Quit date:      Years since quittin.8    Smokeless tobacco: Never   Vaping Use    Vaping Use: Never used   Substance and Sexual Activity    Alcohol use: Yes     Alcohol/week: 4.0 standard drinks of alcohol     Types: 4 Glasses of wine per week     Comment: 4-6 PER WK    Drug use: No    Sexual activity: Yes     Partners: Male     Birth control/protection: None        OB History          2    Para   2    Term   2            AB        Living             SAB        IAB        Ectopic        Molar        Multiple        Live Births   2             Age at menarche-14  Age at first live childbirth-21   2 para 2  0  Underwent a hysterectomy and salpingo-oophorectomy at the age of 42 for abnormal Pap smears.  Hormone replacement therapy-15 years with estradiol patch    Allergies   Allergen Reactions    Amitiza [Lubiprostone] Other (See Comments)     Migraines    Codeine Hives and GI Intolerance    Pantoprazole Nausea And Vomiting            Review of Systems   Constitutional: Negative.    HENT: Negative.     Eyes: Negative.    Respiratory: Negative.     Cardiovascular: Negative.    Gastrointestinal: Negative.    Endocrine: Negative.    Genitourinary: Negative.    Musculoskeletal: Negative.    Skin: Negative.    Allergic/Immunologic: Negative.    Neurological: Negative.    Hematological: Negative.    Psychiatric/Behavioral: Negative.       Review of systems as mentioned in the HPI otherwise negative    Objective   not currently breastfeeding.   Physical Exam  Vitals reviewed.   Constitutional:       Appearance: Normal appearance.   HENT:      Head: Normocephalic and atraumatic.      Right Ear: External ear normal.      Left Ear: External ear normal.      Nose: Nose normal.      Mouth/Throat:       Pharynx: Oropharynx is clear.   Eyes:      Conjunctiva/sclera: Conjunctivae normal.   Cardiovascular:      Rate and Rhythm: Normal rate.   Pulmonary:      Effort: Pulmonary effort is normal.   Abdominal:      General: Abdomen is flat.   Musculoskeletal:         General: Normal range of motion.   Skin:     General: Skin is warm.   Neurological:      Mental Status: She is alert.   Psychiatric:         Mood and Affect: Mood normal.         Behavior: Behavior normal.         Thought Content: Thought content normal.         Judgment: Judgment normal.       Breast Exam: Right breast on inspection there is a scar in the periareolar region which is healing well.  Tender to palpation.  Left breast appears normal inspection.  No palpable abnormalities of the left breast  (Breast exam not performed today as this was a video visit)    Results Encounter on 10/27/2023   Component Date Value Ref Range Status    Glucose 10/26/2023 88  65 - 99 mg/dL Final    BUN 10/26/2023 10  6 - 20 mg/dL Final    Creatinine 10/26/2023 0.54 (L)  0.57 - 1.00 mg/dL Final    EGFR Result 10/26/2023 108.2  >60.0 mL/min/1.73 Final    Comment: GFR Normal >60  Chronic Kidney Disease <60  Kidney Failure <15      BUN/Creatinine Ratio 10/26/2023 18.5  7.0 - 25.0 Final    Sodium 10/26/2023 140  136 - 145 mmol/L Final    Potassium 10/26/2023 4.5  3.5 - 5.2 mmol/L Final    Comment: Slight hemolysis detected by analyzer. Results may be  affected.      Chloride 10/26/2023 101  98 - 107 mmol/L Final    Total CO2 10/26/2023 29.5 (H)  22.0 - 29.0 mmol/L Final    Calcium 10/26/2023 9.6  8.6 - 10.5 mg/dL Final    Total Protein 10/26/2023 6.8  6.0 - 8.5 g/dL Final    Albumin 10/26/2023 5.0  3.5 - 5.2 g/dL Final    Globulin 10/26/2023 1.8  gm/dL Final    A/G Ratio 10/26/2023 2.8  g/dL Final    Total Bilirubin 10/26/2023 1.0  0.0 - 1.2 mg/dL Final    Alkaline Phosphatase 10/26/2023 65  39 - 117 U/L Final    AST (SGOT) 10/26/2023 27  1 - 32 U/L Final    ALT (SGPT)  10/26/2023 16  1 - 33 U/L Final    Total Cholesterol 10/26/2023 173  0 - 200 mg/dL Final    Comment: Cholesterol Reference Ranges  (U.S. Department of Health and Human Services ATP III  Classifications)  Desirable          <200 mg/dL  Borderline High    200-239 mg/dL  High Risk          >240 mg/dL  Triglyceride Reference Ranges  (U.S. Department of Health and Human Services ATP III  Classifications)  Normal           <150 mg/dL  Borderline High  150-199 mg/dL  High             200-499 mg/dL  Very High        >500 mg/dL  HDL Reference Ranges  (U.S. Department of Health and Human Services ATP III  Classifications)  Low     <40 mg/dl (major risk factor for CHD)  High    >60 mg/dl ('negative' risk factor for CHD)  LDL Reference Ranges  (U.S. Department of Health and Human Services ATP III  Classifications)  Optimal          <100 mg/dL  Near Optimal     100-129 mg/dL  Borderline High  130-159 mg/dL  High             160-189 mg/dL  Very High        >189 mg/dL      Triglycerides 10/26/2023 66  0 - 150 mg/dL Final    HDL Cholesterol 10/26/2023 69 (H)  40 - 60 mg/dL Final    VLDL Cholesterol Marques 10/26/2023 13  5 - 40 mg/dL Final    LDL Chol Calc (NIH) 10/26/2023 91  0 - 100 mg/dL Final    Chol/HDL Ratio 10/26/2023 2.51   Final        No radiology results for the last 30 days.       Assessment & Plan       *Focal atypical ductal hyperplasia  Discussed increased lifetime risk of breast cancer associated with ADH.  Given that the ADH is focal risk is likely on the low side.  20-year is likely around 24%.  Wean herself off of the estradiol patch.  Severe hot flashes and insomnia since stopping the estradiol patch.  We will start Effexor 37.5 mg daily  Once hot flashes and insomnia better controlled then she will be started on tamoxifen low-dose    *Hormone replacement therapy  Patient has been on estradiol patch for almost 15 years.  This has been stopped.  Now has significant hot flashes  Start Effexor    *Risk reduction  We  discussed low-dose tamoxifen, 5 mg daily to help decrease the risk of breast cancer.  Delay initiating this by another 1 to 2 months to the menopausal symptoms better controlled.    *Surveillance  Continue annual mammograms and MRIs  Mammogram due January 2024, MRI due July 2024    *Follow-up-1 to 2 months to discuss initiating tamoxifen if postmenopausal symptoms are better.    You have chosen to receive care through a telehealth visit.  Do you consent to use a video/audio connection for your medical care today? Yes

## 2023-11-02 ENCOUNTER — OFFICE VISIT (OUTPATIENT)
Dept: INTERNAL MEDICINE | Facility: CLINIC | Age: 58
End: 2023-11-02
Payer: COMMERCIAL

## 2023-11-02 VITALS
SYSTOLIC BLOOD PRESSURE: 110 MMHG | HEART RATE: 72 BPM | WEIGHT: 118 LBS | TEMPERATURE: 98.7 F | BODY MASS INDEX: 19.66 KG/M2 | HEIGHT: 65 IN | RESPIRATION RATE: 12 BRPM | DIASTOLIC BLOOD PRESSURE: 72 MMHG

## 2023-11-02 DIAGNOSIS — I67.1 CEREBRAL ANEURYSM WITHOUT RUPTURE: Chronic | ICD-10-CM

## 2023-11-02 DIAGNOSIS — G43.909 MIGRAINE WITHOUT STATUS MIGRAINOSUS, NOT INTRACTABLE, UNSPECIFIED MIGRAINE TYPE: Chronic | ICD-10-CM

## 2023-11-02 DIAGNOSIS — Z12.2 SCREENING FOR LUNG CANCER: ICD-10-CM

## 2023-11-02 DIAGNOSIS — Z00.00 ANNUAL PHYSICAL EXAM: Primary | ICD-10-CM

## 2023-11-02 PROCEDURE — 99396 PREV VISIT EST AGE 40-64: CPT | Performed by: NURSE PRACTITIONER

## 2023-11-02 NOTE — PATIENT INSTRUCTIONS
Prevnar 20 is advised--this is available at your local pharmacy.     You are advised to get Shingrix. It is a series of 2 shots given 2-6 months apart. Get this at you local pharmacy.

## 2023-11-03 ENCOUNTER — ANCILLARY PROCEDURE (OUTPATIENT)
Dept: LAB | Facility: HOSPITAL | Age: 58
End: 2023-11-03
Payer: COMMERCIAL

## 2023-11-03 PROCEDURE — 76098 X-RAY EXAM SURGICAL SPECIMEN: CPT

## 2023-12-01 ENCOUNTER — TELEMEDICINE (OUTPATIENT)
Dept: ONCOLOGY | Facility: CLINIC | Age: 58
End: 2023-12-01
Payer: COMMERCIAL

## 2023-12-01 DIAGNOSIS — N60.91 ATYPICAL DUCTAL HYPERPLASIA OF RIGHT BREAST: Primary | ICD-10-CM

## 2023-12-01 PROCEDURE — 99214 OFFICE O/P EST MOD 30 MIN: CPT | Performed by: INTERNAL MEDICINE

## 2023-12-01 RX ORDER — GABAPENTIN 300 MG/1
300 CAPSULE ORAL NIGHTLY
Qty: 90 CAPSULE | Refills: 0 | Status: SHIPPED | OUTPATIENT
Start: 2023-12-01

## 2023-12-01 NOTE — PROGRESS NOTES
Subjective   Iliana Reyes is a 57 y.o. female.  Referred by Dr. Head for right breast atypical ductal hyperplasia, focal    History of Present Illness   Ms. Reyes is a 57-year-old postmenopausal  lady who has been on hormone replacement therapy with estradiol patch for 15 years presents with a palpable abnormality of the right breast.    1/9/2023-bilateral screening mammogram benign.    3/2/2023-right breast diagnostic mammogram after palpating a right breast mass for about 6 to 8 weeks.  No suspicious focal mammographic abnormalities are identified deep to the marker.  No suspicious masses, calcifications or areas of architectural distortion    Ultrasound  At 3:00, 3 cm from the nipple and 4:00, 1 to 2 cm from the nipple ultrasound was performed.  At 4:00, 2 cm from the nipple there is approximately 0.6 time 0.5 x 0.5 cm masslike area with indistinct margins which may reflect focal dense fibroglandular tissue but is suspicious.  At 4:00, 1 cm from the nipple there is a 0.4 cm benign-appearing cyst.    Impression  Suspicious 0.6 cm masslike area at 4:00 in the right breast.  Ultrasound-guided core needle biopsy recommended.    3/29/2023-ultrasound-guided core needle biopsy  Pathology consistent with rare minute focus of cribriform atypical ductal hyperplasia.  Fibroadenomatoid hyperplasia, focally clustered ductal cyst.  No carcinoma in situ.    8/10/2023-right breast lumpectomy  Fibrocystic change, apocrine cyst, adenosis and usual ductal hyperplasia and cautery artifact.  No residual atypical hyperplasia carcinoma in situ or invasive carcinoma.    Bilateral breast MRI 6/28/2023-non-mass enhancement measuring 1.2 cm at 4:00 middle right breast with a focus of susceptibility from biopsy clip marking the site of biopsy-proven atypia.  Recommend surgical management  No MRI evidence of malignancy in the left breast.    Interval history  Ms. Reyes presents today for a video visit .   At her last visit  we prescribed Effexor to help with hot flashes however she still having severe hot flashes.  Effexor not seem to help.  She does not have any other complaints other than that.  Scheduled for her next mammogram in January 2024.    The following portions of the patient's history were reviewed and updated as appropriate: allergies, current medications, past family history, past medical history, past social history, past surgical history, and problem list.    Past Medical History:   Diagnosis Date    Aneurysm     OPTICAL-TODNAM    Leiva esophagus     Chest pain 2016    Gastric ulcer     GERD (gastroesophageal reflux disease)     History of migraine     Pyelonephritis 10/31/2022    Tubular adenoma of colon         Past Surgical History:   Procedure Laterality Date    BREAST BIOPSY      BREAST LUMPECTOMY Right 8/10/2023    Procedure: RIGHT breast ANALI guided excision atypical hyperplasia;  Surgeon: Alissa Head MD;  Location: [facility];  Service: General;  Laterality: Right;    CHOLECYSTECTOMY      Dr. EDER Alba    COLONOSCOPY  05/18/2016    polyp, tics, tubular adenoma    COLONOSCOPY  06/05/2019    normal ileum, diverticulosis, NBIH, TAx1    ENDOSCOPY  05/18/2016    gastric ulcer w/clean base, acute gastritis. Leiva's esophagus    ENDOSCOPY  06/05/2019    normal esophagus/duodenum, acute gastritis    HYSTERECTOMY      OOPHORECTOMY      SHOULDER ARTHROSCOPY Left     Dr. Plunkett    UPPER GASTROINTESTINAL ENDOSCOPY  05/18/2016        Family History   Problem Relation Age of Onset    Heart disease Mother     Inflammatory bowel disease Mother     Colon cancer Mother     Colon polyps Mother     Liver cancer Mother     Ulcerative colitis Mother     Cerebral aneurysm Father     Crohn's disease Brother     Colon polyps Brother     Heart attack Brother 48    Heart attack Brother 40    Crohn's disease Cousin     Crohn's disease Cousin     Learning disabilities Neg Hx         Social History     Socioeconomic History     Marital status:    Tobacco Use    Smoking status: Former     Packs/day: 1.00     Years: 20.00     Additional pack years: 0.00     Total pack years: 20.00     Types: Cigarettes     Quit date:      Years since quittin.9    Smokeless tobacco: Never   Vaping Use    Vaping Use: Never used   Substance and Sexual Activity    Alcohol use: Yes     Alcohol/week: 4.0 standard drinks of alcohol     Types: 4 Glasses of wine per week     Comment: 4-6 PER WK    Drug use: No    Sexual activity: Yes     Partners: Male     Birth control/protection: None        OB History          2    Para   2    Term   2            AB        Living             SAB        IAB        Ectopic        Molar        Multiple        Live Births   2             Age at menarche-14  Age at first live childbirth-21   2 para 2  0  Underwent a hysterectomy and salpingo-oophorectomy at the age of 42 for abnormal Pap smears.  Hormone replacement therapy-15 years with estradiol patch    Allergies   Allergen Reactions    Amitiza [Lubiprostone] Other (See Comments)     Migraines    Codeine Hives and GI Intolerance    Pantoprazole Nausea And Vomiting            Review of Systems   Constitutional: Negative.    HENT: Negative.     Eyes: Negative.    Respiratory: Negative.     Cardiovascular: Negative.    Gastrointestinal: Negative.    Endocrine: Negative.    Genitourinary: Negative.    Musculoskeletal: Negative.    Skin: Negative.    Allergic/Immunologic: Negative.    Neurological: Negative.    Hematological: Negative.    Psychiatric/Behavioral: Negative.       Review of systems as mentioned in the HPI otherwise negative    Objective   not currently breastfeeding.   Physical Exam  Vitals reviewed.   Constitutional:       Appearance: Normal appearance.   HENT:      Head: Normocephalic and atraumatic.      Right Ear: External ear normal.      Left Ear: External ear normal.      Nose: Nose normal.      Mouth/Throat:       Pharynx: Oropharynx is clear.   Eyes:      Conjunctiva/sclera: Conjunctivae normal.   Cardiovascular:      Rate and Rhythm: Normal rate.   Pulmonary:      Effort: Pulmonary effort is normal.   Abdominal:      General: Abdomen is flat.   Musculoskeletal:         General: Normal range of motion.   Skin:     General: Skin is warm.   Neurological:      Mental Status: She is alert.   Psychiatric:         Mood and Affect: Mood normal.         Behavior: Behavior normal.         Thought Content: Thought content normal.         Judgment: Judgment normal.       Breast Exam: Right breast on inspection there is a scar in the periareolar region which is healing well.  Tender to palpation.  Left breast appears normal inspection.  No palpable abnormalities of the left breast  (Breast exam not performed today as this was a video visit)    I have reexamined the patient and the results are consistent with the previously documented exam. Amada Guillen MD      No visits with results within 30 Day(s) from this visit.   Latest known visit with results is:   Results Encounter on 10/27/2023   Component Date Value Ref Range Status    Glucose 10/26/2023 88  65 - 99 mg/dL Final    BUN 10/26/2023 10  6 - 20 mg/dL Final    Creatinine 10/26/2023 0.54 (L)  0.57 - 1.00 mg/dL Final    EGFR Result 10/26/2023 108.2  >60.0 mL/min/1.73 Final    Comment: GFR Normal >60  Chronic Kidney Disease <60  Kidney Failure <15      BUN/Creatinine Ratio 10/26/2023 18.5  7.0 - 25.0 Final    Sodium 10/26/2023 140  136 - 145 mmol/L Final    Potassium 10/26/2023 4.5  3.5 - 5.2 mmol/L Final    Comment: Slight hemolysis detected by analyzer. Results may be  affected.      Chloride 10/26/2023 101  98 - 107 mmol/L Final    Total CO2 10/26/2023 29.5 (H)  22.0 - 29.0 mmol/L Final    Calcium 10/26/2023 9.6  8.6 - 10.5 mg/dL Final    Total Protein 10/26/2023 6.8  6.0 - 8.5 g/dL Final    Albumin 10/26/2023 5.0  3.5 - 5.2 g/dL Final    Globulin 10/26/2023 1.8  gm/dL Final     A/G Ratio 10/26/2023 2.8  g/dL Final    Total Bilirubin 10/26/2023 1.0  0.0 - 1.2 mg/dL Final    Alkaline Phosphatase 10/26/2023 65  39 - 117 U/L Final    AST (SGOT) 10/26/2023 27  1 - 32 U/L Final    ALT (SGPT) 10/26/2023 16  1 - 33 U/L Final    Total Cholesterol 10/26/2023 173  0 - 200 mg/dL Final    Comment: Cholesterol Reference Ranges  (U.S. Department of Health and Human Services ATP III  Classifications)  Desirable          <200 mg/dL  Borderline High    200-239 mg/dL  High Risk          >240 mg/dL  Triglyceride Reference Ranges  (U.S. Department of Health and Human Services ATP III  Classifications)  Normal           <150 mg/dL  Borderline High  150-199 mg/dL  High             200-499 mg/dL  Very High        >500 mg/dL  HDL Reference Ranges  (U.S. Department of Health and Human Services ATP III  Classifications)  Low     <40 mg/dl (major risk factor for CHD)  High    >60 mg/dl ('negative' risk factor for CHD)  LDL Reference Ranges  (U.S. Department of Health and Human Services ATP III  Classifications)  Optimal          <100 mg/dL  Near Optimal     100-129 mg/dL  Borderline High  130-159 mg/dL  High             160-189 mg/dL  Very High        >189 mg/dL      Triglycerides 10/26/2023 66  0 - 150 mg/dL Final    HDL Cholesterol 10/26/2023 69 (H)  40 - 60 mg/dL Final    VLDL Cholesterol Marques 10/26/2023 13  5 - 40 mg/dL Final    LDL Chol Calc (NIH) 10/26/2023 91  0 - 100 mg/dL Final    Chol/HDL Ratio 10/26/2023 2.51   Final        No radiology results for the last 30 days.       Assessment & Plan       *Focal atypical ductal hyperplasia  Discussed increased lifetime risk of breast cancer associated with ADH.  Given that the ADH is focal risk is likely on the low side.  20-year is likely around 24%.  Wean herself off of the estradiol patch.  Continues to experience severe hot flashes and insomnia since stopping the estradiol patch.  Effexor was not helpful.  Discontinue Effexor  Start gabapentin 300 mg nightly and  increase to 3 times daily as tolerated.  We discussed starting tamoxifen now versus waiting.  We have decided to wait for another month to see if gabapentin would help her with the symptoms before starting tamoxifen    *Hormone replacement therapy  Patient has been on estradiol patch for almost 15 years.  This has been stopped.  Now has significant hot flashes  Effexor ineffective  Start gabapentin delay initiating tamoxifen by a month    *Risk reduction  We discussed low-dose tamoxifen, 5 mg daily to help decrease the risk of breast cancer.  Delay initiating this by another 1 to 2 months to the menopausal symptoms better controlled.    *Surveillance  Continue annual mammograms and MRIs  Mammogram due January 2024, MRI due July 2024    *Follow-up- 1 to 2 months for a video visit      You have chosen to receive care through a telehealth visit.  Do you consent to use a video/audio connection for your medical care today? Yes

## 2023-12-04 RX ORDER — VENLAFAXINE HYDROCHLORIDE 75 MG/1
75 CAPSULE, EXTENDED RELEASE ORAL DAILY
Qty: 30 CAPSULE | Refills: 3 | Status: SHIPPED | OUTPATIENT
Start: 2023-12-04

## 2023-12-04 NOTE — TELEPHONE ENCOUNTER
In regards to MyChart conversation- effexor xr 75mg daily sent to pt pharmacy due to gabapentin being ineffective for hot flashes.

## 2023-12-20 ENCOUNTER — TELEPHONE (OUTPATIENT)
Dept: SURGERY | Facility: CLINIC | Age: 58
End: 2023-12-20
Payer: COMMERCIAL

## 2023-12-21 NOTE — TELEPHONE ENCOUNTER
Pt called regarding a bill she received for an ancillary procedure on 11/03.  She has asked that I look into what this charge is for.  Pt had a jazlyn placed and MMG to follow on 8/10/2023 but there is 2 charges- 1 on 8/10/2023 and one on 11/03/2023.  Fatmata contacted billing and inquired, ill follow up with the pt as soon as I have an answer.     CMA

## 2024-01-19 ENCOUNTER — TELEMEDICINE (OUTPATIENT)
Dept: ONCOLOGY | Facility: CLINIC | Age: 59
End: 2024-01-19
Payer: COMMERCIAL

## 2024-01-19 DIAGNOSIS — N60.91 ATYPICAL DUCTAL HYPERPLASIA OF RIGHT BREAST: Primary | ICD-10-CM

## 2024-01-19 PROCEDURE — 99214 OFFICE O/P EST MOD 30 MIN: CPT | Performed by: INTERNAL MEDICINE

## 2024-01-19 RX ORDER — TAMOXIFEN CITRATE 10 MG/1
5 TABLET ORAL DAILY
Qty: 45 TABLET | Refills: 3 | Status: SHIPPED | OUTPATIENT
Start: 2024-01-19

## 2024-01-19 NOTE — PROGRESS NOTES
Subjective   Iliana Reyes is a 58 y.o. female.  Referred by Dr. Head for right breast atypical ductal hyperplasia, focal    History of Present Illness   Ms. Reyes is a 57-year-old postmenopausal  lady who has been on hormone replacement therapy with estradiol patch for 15 years presents with a palpable abnormality of the right breast.    1/9/2023-bilateral screening mammogram benign.    3/2/2023-right breast diagnostic mammogram after palpating a right breast mass for about 6 to 8 weeks.  No suspicious focal mammographic abnormalities are identified deep to the marker.  No suspicious masses, calcifications or areas of architectural distortion    Ultrasound  At 3:00, 3 cm from the nipple and 4:00, 1 to 2 cm from the nipple ultrasound was performed.  At 4:00, 2 cm from the nipple there is approximately 0.6 time 0.5 x 0.5 cm masslike area with indistinct margins which may reflect focal dense fibroglandular tissue but is suspicious.  At 4:00, 1 cm from the nipple there is a 0.4 cm benign-appearing cyst.    Impression  Suspicious 0.6 cm masslike area at 4:00 in the right breast.  Ultrasound-guided core needle biopsy recommended.    3/29/2023-ultrasound-guided core needle biopsy  Pathology consistent with rare minute focus of cribriform atypical ductal hyperplasia.  Fibroadenomatoid hyperplasia, focally clustered ductal cyst.  No carcinoma in situ.    8/10/2023-right breast lumpectomy  Fibrocystic change, apocrine cyst, adenosis and usual ductal hyperplasia and cautery artifact.  No residual atypical hyperplasia carcinoma in situ or invasive carcinoma.    Bilateral breast MRI 6/28/2023-non-mass enhancement measuring 1.2 cm at 4:00 middle right breast with a focus of susceptibility from biopsy clip marking the site of biopsy-proven atypia.  Recommend surgical management  No MRI evidence of malignancy in the left breast.    Interval history  Ms. Reyes presents today for a video visit .   She was prescribed  gabapentin without any improvement in hot flashes and therefore the treatment was changed to Effexor 75 mg daily.  She reports that the Effexor has really helped her significantly with the hot flashes and she feels well.  Denies any new complaints at this time  She is willing to proceed with tamoxifen low-dose at this time.    The following portions of the patient's history were reviewed and updated as appropriate: allergies, current medications, past family history, past medical history, past social history, past surgical history, and problem list.    Past Medical History:   Diagnosis Date    Aneurysm     OPTICAL-TODNAM    Leiva esophagus     Chest pain 2016    Gastric ulcer     GERD (gastroesophageal reflux disease)     History of migraine     Pyelonephritis 10/31/2022    Tubular adenoma of colon         Past Surgical History:   Procedure Laterality Date    BREAST BIOPSY      BREAST LUMPECTOMY Right 8/10/2023    Procedure: RIGHT breast ANALI guided excision atypical hyperplasia;  Surgeon: Alissa Head MD;  Location: [facility];  Service: General;  Laterality: Right;    CHOLECYSTECTOMY      Dr. EDER Alba    COLONOSCOPY  05/18/2016    polyp, tics, tubular adenoma    COLONOSCOPY  06/05/2019    normal ileum, diverticulosis, NBIH, TAx1    ENDOSCOPY  05/18/2016    gastric ulcer w/clean base, acute gastritis. Leiva's esophagus    ENDOSCOPY  06/05/2019    normal esophagus/duodenum, acute gastritis    HYSTERECTOMY      OOPHORECTOMY      SHOULDER ARTHROSCOPY Left     Dr. Plunkett    UPPER GASTROINTESTINAL ENDOSCOPY  05/18/2016        Family History   Problem Relation Age of Onset    Heart disease Mother     Inflammatory bowel disease Mother     Colon cancer Mother     Colon polyps Mother     Liver cancer Mother     Ulcerative colitis Mother     Cerebral aneurysm Father     Crohn's disease Brother     Colon polyps Brother     Heart attack Brother 48    Heart attack Brother 40    Crohn's disease Cousin     Crohn's  disease Cousin     Learning disabilities Neg Hx         Social History     Socioeconomic History    Marital status:    Tobacco Use    Smoking status: Former     Packs/day: 1.00     Years: 20.00     Additional pack years: 0.00     Total pack years: 20.00     Types: Cigarettes     Quit date:      Years since quittin.0    Smokeless tobacco: Never   Vaping Use    Vaping Use: Never used   Substance and Sexual Activity    Alcohol use: Yes     Alcohol/week: 4.0 standard drinks of alcohol     Types: 4 Glasses of wine per week     Comment: 4-6 PER WK    Drug use: No    Sexual activity: Yes     Partners: Male     Birth control/protection: None        OB History          2    Para   2    Term   2            AB        Living             SAB        IAB        Ectopic        Molar        Multiple        Live Births   2             Age at menarche-14  Age at first live childbirth-21   2 para 2  0  Underwent a hysterectomy and salpingo-oophorectomy at the age of 42 for abnormal Pap smears.  Hormone replacement therapy-15 years with estradiol patch    Allergies   Allergen Reactions    Amitiza [Lubiprostone] Other (See Comments)     Migraines    Codeine Hives and GI Intolerance    Pantoprazole Nausea And Vomiting            Review of Systems   Constitutional: Negative.    HENT: Negative.     Eyes: Negative.    Respiratory: Negative.     Cardiovascular: Negative.    Gastrointestinal: Negative.    Endocrine: Negative.    Genitourinary: Negative.    Musculoskeletal: Negative.    Skin: Negative.    Allergic/Immunologic: Negative.    Neurological: Negative.    Hematological: Negative.    Psychiatric/Behavioral: Negative.       Review of systems as mentioned HPI otherwise negative    Objective   not currently breastfeeding.   Physical Exam  Vitals reviewed.   Constitutional:       Appearance: Normal appearance.   HENT:      Head: Normocephalic and atraumatic.      Right Ear: External ear normal.       Left Ear: External ear normal.      Nose: Nose normal.      Mouth/Throat:      Pharynx: Oropharynx is clear.   Eyes:      Conjunctiva/sclera: Conjunctivae normal.   Cardiovascular:      Rate and Rhythm: Normal rate.   Pulmonary:      Effort: Pulmonary effort is normal.   Abdominal:      General: Abdomen is flat.   Musculoskeletal:         General: Normal range of motion.   Skin:     General: Skin is warm.   Neurological:      Mental Status: She is alert.   Psychiatric:         Mood and Affect: Mood normal.         Behavior: Behavior normal.         Thought Content: Thought content normal.         Judgment: Judgment normal.       Breast Exam: Right breast on inspection there is a scar in the periareolar region which is healing well.  Tender to palpation.  Left breast appears normal inspection.  No palpable abnormalities of the left breast  (Breast exam not performed today as this was a video visit)    I have reexamined the patient and the results are consistent with the previously documented exam. Amada Guillen MD      No visits with results within 30 Day(s) from this visit.   Latest known visit with results is:   Results Encounter on 10/27/2023   Component Date Value Ref Range Status    Glucose 10/26/2023 88  65 - 99 mg/dL Final    BUN 10/26/2023 10  6 - 20 mg/dL Final    Creatinine 10/26/2023 0.54 (L)  0.57 - 1.00 mg/dL Final    EGFR Result 10/26/2023 108.2  >60.0 mL/min/1.73 Final    Comment: GFR Normal >60  Chronic Kidney Disease <60  Kidney Failure <15      BUN/Creatinine Ratio 10/26/2023 18.5  7.0 - 25.0 Final    Sodium 10/26/2023 140  136 - 145 mmol/L Final    Potassium 10/26/2023 4.5  3.5 - 5.2 mmol/L Final    Comment: Slight hemolysis detected by analyzer. Results may be  affected.      Chloride 10/26/2023 101  98 - 107 mmol/L Final    Total CO2 10/26/2023 29.5 (H)  22.0 - 29.0 mmol/L Final    Calcium 10/26/2023 9.6  8.6 - 10.5 mg/dL Final    Total Protein 10/26/2023 6.8  6.0 - 8.5 g/dL Final     Albumin 10/26/2023 5.0  3.5 - 5.2 g/dL Final    Globulin 10/26/2023 1.8  gm/dL Final    A/G Ratio 10/26/2023 2.8  g/dL Final    Total Bilirubin 10/26/2023 1.0  0.0 - 1.2 mg/dL Final    Alkaline Phosphatase 10/26/2023 65  39 - 117 U/L Final    AST (SGOT) 10/26/2023 27  1 - 32 U/L Final    ALT (SGPT) 10/26/2023 16  1 - 33 U/L Final    Total Cholesterol 10/26/2023 173  0 - 200 mg/dL Final    Comment: Cholesterol Reference Ranges  (U.S. Department of Health and Human Services ATP III  Classifications)  Desirable          <200 mg/dL  Borderline High    200-239 mg/dL  High Risk          >240 mg/dL  Triglyceride Reference Ranges  (U.S. Department of Health and Human Services ATP III  Classifications)  Normal           <150 mg/dL  Borderline High  150-199 mg/dL  High             200-499 mg/dL  Very High        >500 mg/dL  HDL Reference Ranges  (U.S. Department of Health and Human Services ATP III  Classifications)  Low     <40 mg/dl (major risk factor for CHD)  High    >60 mg/dl ('negative' risk factor for CHD)  LDL Reference Ranges  (U.S. Department of Health and Human Services ATP III  Classifications)  Optimal          <100 mg/dL  Near Optimal     100-129 mg/dL  Borderline High  130-159 mg/dL  High             160-189 mg/dL  Very High        >189 mg/dL      Triglycerides 10/26/2023 66  0 - 150 mg/dL Final    HDL Cholesterol 10/26/2023 69 (H)  40 - 60 mg/dL Final    VLDL Cholesterol Marques 10/26/2023 13  5 - 40 mg/dL Final    LDL Chol Calc (NIH) 10/26/2023 91  0 - 100 mg/dL Final    Chol/HDL Ratio 10/26/2023 2.51   Final        No radiology results for the last 30 days.       Assessment & Plan       *Focal atypical ductal hyperplasia  Discussed increased lifetime risk of breast cancer associated with ADH.  Given that the ADH is focal risk is likely on the low side.  20-year is likely around 24%.  Wean herself off of the estradiol patch.  Continues to experience severe hot flashes and insomnia since stopping the estradiol  patch.  Effexor was not helpful at the 37.5 mg dose since discontinued  Start gabapentin 300 mg nightly and increase to 3 times daily as tolerated.  She did not tolerate gabapentin hence this was discontinued  Subsequently Effexor was started at a higher dose of 75 mg daily.  She is doing much better on the Effexor and hot flashes well-controlled.  Therefore we will proceed with tamoxifen at this time    *Hormone replacement therapy  Patient has been on estradiol patch for almost 15 years.  This has been stopped.  Significant hot flashes  Hot flashes improved with Effexor 75 mg daily    *Hot flashes-continue Effexor 75 mg to    *Risk reduction  We discussed low-dose tamoxifen, 5 mg daily to help decrease the risk of breast cancer.  She is willing to proceed with tamoxifen at this time  Reviewed the side effects again    *Surveillance  Continue annual mammograms and MRIs  Mammogram due January 29th 2024, MRI due July 2024    *[id number]-TY in 2 months      You have chosen to receive care through a telehealth visit.  Do you consent to use a video/audio connection for your medical care today? Yes

## 2024-01-29 ENCOUNTER — PROCEDURE VISIT (OUTPATIENT)
Dept: OBSTETRICS AND GYNECOLOGY | Facility: CLINIC | Age: 59
End: 2024-01-29
Payer: COMMERCIAL

## 2024-01-29 ENCOUNTER — OFFICE VISIT (OUTPATIENT)
Dept: OBSTETRICS AND GYNECOLOGY | Facility: CLINIC | Age: 59
End: 2024-01-29
Payer: COMMERCIAL

## 2024-01-29 VITALS
DIASTOLIC BLOOD PRESSURE: 82 MMHG | SYSTOLIC BLOOD PRESSURE: 118 MMHG | BODY MASS INDEX: 19.49 KG/M2 | WEIGHT: 117 LBS | HEIGHT: 65 IN

## 2024-01-29 DIAGNOSIS — M85.80 OSTEOPENIA AFTER MENOPAUSE: ICD-10-CM

## 2024-01-29 DIAGNOSIS — N60.91 ATYPICAL DUCTAL HYPERPLASIA OF RIGHT BREAST: ICD-10-CM

## 2024-01-29 DIAGNOSIS — Z13.820 OSTEOPOROSIS SCREENING: ICD-10-CM

## 2024-01-29 DIAGNOSIS — Z90.710 HISTORY OF HYSTERECTOMY: ICD-10-CM

## 2024-01-29 DIAGNOSIS — Z87.411 HISTORY OF VAGINAL DYSPLASIA: ICD-10-CM

## 2024-01-29 DIAGNOSIS — Z78.0 POSTMENOPAUSAL STATUS: ICD-10-CM

## 2024-01-29 DIAGNOSIS — Z12.4 CERVICAL CANCER SCREENING: ICD-10-CM

## 2024-01-29 DIAGNOSIS — Z78.0 OSTEOPENIA AFTER MENOPAUSE: ICD-10-CM

## 2024-01-29 DIAGNOSIS — Z01.419 WELL WOMAN EXAM WITH ROUTINE GYNECOLOGICAL EXAM: Primary | ICD-10-CM

## 2024-01-29 DIAGNOSIS — Z12.31 VISIT FOR SCREENING MAMMOGRAM: Primary | ICD-10-CM

## 2024-01-29 DIAGNOSIS — Z12.31 BREAST CANCER SCREENING BY MAMMOGRAM: ICD-10-CM

## 2024-01-29 PROCEDURE — 99459 PELVIC EXAMINATION: CPT | Performed by: STUDENT IN AN ORGANIZED HEALTH CARE EDUCATION/TRAINING PROGRAM

## 2024-01-29 PROCEDURE — 99396 PREV VISIT EST AGE 40-64: CPT | Performed by: STUDENT IN AN ORGANIZED HEALTH CARE EDUCATION/TRAINING PROGRAM

## 2024-01-29 PROCEDURE — 77067 SCR MAMMO BI INCL CAD: CPT | Performed by: STUDENT IN AN ORGANIZED HEALTH CARE EDUCATION/TRAINING PROGRAM

## 2024-01-29 PROCEDURE — 77063 BREAST TOMOSYNTHESIS BI: CPT | Performed by: STUDENT IN AN ORGANIZED HEALTH CARE EDUCATION/TRAINING PROGRAM

## 2024-01-29 NOTE — PROGRESS NOTES
GYN Annual Exam     CC- Here for annual exam.     Iliana Reyes is a 58 y.o. female who presents for annual well woman exam. She has history of hysterectomy with BSO at age 41 for heavy menstrual periods. She was taking HRT with 0.025 Vivelle Dot twice weekly until last year when she had biopsy of right breast mass that revealed atypical ductal hyperplasia. She has since underwent excisional biopsy in 2023 and has been started on Tamoxifen. She is having mammograms and breast MRIs every 6 months alternating.   She was discontinued on HRT and started on Effexor. This was increased after trial of Gabapentin failed. She is now taking Effexor 75 mg daily and reports improvement in her hot flashes, though she is still having them.   Denies vaginal bleeding.     OB History          2    Para   2    Term   2            AB        Living             SAB        IAB        Ectopic        Molar        Multiple        Live Births   2                Current contraception: post menopausal status; s/p hysterectomy with BSO  History of abnormal Pap smear: yes - abnormal vaginal pap 19 and several before that time- history of mild vaginal dysplasia   Family history of uterine, colon or ovarian cancer: yes - Mother had colon, liver and brain cancer.   History of abnormal mammogram: yes - - atypical ductal hyperplasia   Family history of breast cancer: no  Last Pap : 1/10/23- NILM  Last Completed Pap Smear            PAP SMEAR (Every 3 Years) Next due on 1/10/2026      01/10/2023  IGP,rfx Aptima HPV All Pth    06/10/2021  IGP,rfx Aptima HPV All Pth    2020  IGP,rfx Aptima HPV All Pth    2019  IGP,rfx Aptima HPV All Pth    2018  IGP,rfx Aptima HPV All Pth    Only the first 5 history entries have been loaded, but more history exists.                   Last mammogram: 23- BIRADS-1; 23- right breast atypia marked with biopsy clip   Last Completed Mammogram            MAMMOGRAM (Every  2 Years) Next due on 8/4/2025 08/04/2023  Mammo Diagnostic Right With CAD    03/29/2023  Mammo Diagnostic Right With CAD    03/02/2023  Mammo Diagnostic Right With CAD    01/09/2023  Mammo Screening Digital Tomosynthesis Bilateral With CAD    06/10/2021  Mammo Screening Digital Tomosynthesis Bilateral With CAD    Only the first 5 history entries have been loaded, but more history exists.                   Last colonoscopy:  5/2019 - polyps- repeat in 5 years   Last Completed Colonoscopy            COLORECTAL CANCER SCREENING (COLONOSCOPY - Every 5 Years) Next due on 6/5/2024 06/05/2019  SCANNED - COLONOSCOPY    05/06/2019  Fecal Occult Blood component of POC Occult Blood Stool    04/08/2019  Fecal Occult Blood component of POC Occult Blood Stool                   Last DEXA: 2019 - osteopenia   Parental Hip Fracture: denies     Past Medical History:   Diagnosis Date    Aneurysm     OPTICAL-TODNAM    Leiva esophagus     Chest pain 2016    Gastric ulcer     GERD (gastroesophageal reflux disease)     History of migraine     Pyelonephritis 10/31/2022    Tubular adenoma of colon        Past Surgical History:   Procedure Laterality Date    BREAST BIOPSY      BREAST LUMPECTOMY Right 8/10/2023    Procedure: RIGHT breast ANALI guided excision atypical hyperplasia;  Surgeon: Alissa Head MD;  Location: [facility];  Service: General;  Laterality: Right;    CHOLECYSTECTOMY      Dr. EDER Alba    COLONOSCOPY  05/18/2016    polyp, tics, tubular adenoma    COLONOSCOPY  06/05/2019    normal ileum, diverticulosis, NBIH, TAx1    ENDOSCOPY  05/18/2016    gastric ulcer w/clean base, acute gastritis. Leiva's esophagus    ENDOSCOPY  06/05/2019    normal esophagus/duodenum, acute gastritis    HYSTERECTOMY      OOPHORECTOMY      SHOULDER ARTHROSCOPY Left     Dr. Plunkett    UPPER GASTROINTESTINAL ENDOSCOPY  05/18/2016         Current Outpatient Medications:     acetaminophen (TYLENOL) 325 MG tablet, Take 2 tablets by  mouth Every 6 (Six) Hours As Needed for Mild Pain., Disp: , Rfl:     Aimovig 140 MG/ML auto-injector, Inject 1 mL under the skin into the appropriate area as directed Every 30 (Thirty) Days., Disp: 1.12 mL, Rfl: 12    Calcium-Magnesium-Vitamin D (CALCIUM 500 PO), Take 1 tablet by mouth Daily., Disp: , Rfl:     cholecalciferol (VITAMIN D3) 25 MCG (1000 UT) tablet, Take 1 tablet by mouth Daily., Disp: , Rfl:     diphenhydrAMINE (BENADRYL) 25 mg, Take 1 capsule by mouth Every 6 (Six) Hours As Needed for Itching or Allergies., Disp: , Rfl:     linaclotide (Linzess) 145 MCG capsule capsule, Take 1 capsule by mouth Every Morning Before Breakfast., Disp: 90 capsule, Rfl: 3    Restasis 0.05 % ophthalmic emulsion, Administer 1 drop to both eyes 2 (Two) Times a Day., Disp: , Rfl:     tamoxifen (NOLVADEX) 10 MG tablet, Take 0.5 tablets by mouth Daily., Disp: 45 tablet, Rfl: 3    venlafaxine XR (Effexor XR) 75 MG 24 hr capsule, Take 1 capsule by mouth Daily., Disp: 30 capsule, Rfl: 3    gabapentin (NEURONTIN) 300 MG capsule, Take 1 capsule by mouth Every Night. May increase to three times a day as tolerated, Disp: 90 capsule, Rfl: 0    Allergies   Allergen Reactions    Amitiza [Lubiprostone] Other (See Comments)     Migraines    Codeine Hives and GI Intolerance    Pantoprazole Nausea And Vomiting       Social History     Tobacco Use    Smoking status: Former     Packs/day: 1.00     Years: 20.00     Additional pack years: 0.00     Total pack years: 20.00     Types: Cigarettes     Quit date:      Years since quittin.0    Smokeless tobacco: Never   Vaping Use    Vaping Use: Never used   Substance Use Topics    Alcohol use: Yes     Alcohol/week: 4.0 standard drinks of alcohol     Types: 4 Glasses of wine per week     Comment: 4-6 PER WK    Drug use: No       Family History   Problem Relation Age of Onset    Heart disease Mother     Inflammatory bowel disease Mother     Colon cancer Mother     Colon polyps Mother     Liver  "cancer Mother     Ulcerative colitis Mother     Cerebral aneurysm Father     Crohn's disease Brother     Colon polyps Brother     Heart attack Brother 48    Heart attack Brother 40    Crohn's disease Cousin     Crohn's disease Cousin     Learning disabilities Neg Hx        Review of Systems   All other systems reviewed and are negative.      Ht 165.1 cm (65\")   Wt 53.1 kg (117 lb)   LMP  (LMP Unknown) Comment: complete  BMI 19.47 kg/m²     Physical Exam  Vitals reviewed. Exam conducted with a chaperone present.   Constitutional:       General: She is not in acute distress.  HENT:      Head: Normocephalic and atraumatic.      Right Ear: External ear normal.      Left Ear: External ear normal.   Eyes:      Extraocular Movements: Extraocular movements intact.      Pupils: Pupils are equal, round, and reactive to light.   Cardiovascular:      Rate and Rhythm: Normal rate.   Pulmonary:      Effort: Pulmonary effort is normal. No respiratory distress.   Chest:   Breasts:     Right: No swelling, bleeding, inverted nipple, mass, nipple discharge, skin change or tenderness.      Left: No swelling, bleeding, inverted nipple, mass, nipple discharge, skin change or tenderness.       Abdominal:      General: There is no distension.      Palpations: Abdomen is soft.      Tenderness: There is no abdominal tenderness. There is no guarding or rebound.   Genitourinary:     General: Normal vulva.      Exam position: Lithotomy position.      Labia:         Right: No rash, tenderness, lesion or injury.         Left: No rash, tenderness, lesion or injury.       Urethra: No prolapse or urethral swelling.      Vagina: No vaginal discharge, erythema, tenderness, bleeding or lesions.      Cervix: Normal.      Uterus: Absent. Not enlarged, not fixed and not tender.       Adnexa:         Right: No mass, tenderness or fullness.          Left: No mass, tenderness or fullness.        Comments: Surgically absent uterus and cervix.  Well healed " vaginal cuff without lesions, masses or tenderness.   Musculoskeletal:         General: No deformity. Normal range of motion.      Cervical back: Normal range of motion and neck supple.   Lymphadenopathy:      Upper Body:      Right upper body: No supraclavicular or axillary adenopathy.      Left upper body: No supraclavicular or axillary adenopathy.      Lower Body: No right inguinal adenopathy. No left inguinal adenopathy.   Skin:     General: Skin is warm and dry.   Neurological:      General: No focal deficit present.      Mental Status: She is alert and oriented to person, place, and time.   Psychiatric:         Mood and Affect: Mood normal.         Behavior: Behavior normal.            Assessment     1) GYN annual well woman exam.   2) Postmenopausal status  3) History of hysterectomy  4) Cervical cancer screening  5) History of vaginal dysplasia  6) Breast cancer screening  7) Osteopenia  8) Osteoporosis screening   9) Atypical ductal hyperplasia of right breast      Plan     1) Breast Health - Clinical breast exam & mammogram yearly, Self breast awareness monthly. Mammogram performed today for breast cancer screening and will have MRI scheduled in 07/2024 given history of atypical ductal hyperplasia. Followed by Hem/Onc and on Tamoxifen.   2) Pap - Collected vaginal pap smear for cervical cancer screening and history of vaginal dysplasia.    3) Smoking status- former smoker.   4) Colon health - screening colonoscopy recommended if not up to date  5) Bone health - Weight bearing exercise, dietary calcium recommendations and vitamin D reviewed. Will obtain DEXA scan this year to evaluate for osteoporosis screening.   6) Activity recommends - Adult 150-300 min/week of multi-component physical activities that include balance training, aerobic and physical strengthening.    7) Follow up prn and one year      Karo Ren MD  1/29/2024  08:58 EST

## 2024-02-01 LAB
CONV .: NORMAL
CYTOLOGIST CVX/VAG CYTO: NORMAL
CYTOLOGY CVX/VAG DOC CYTO: NORMAL
CYTOLOGY CVX/VAG DOC THIN PREP: NORMAL
DX ICD CODE: NORMAL
HIV 1 & 2 AB SER-IMP: NORMAL
Lab: NORMAL
OTHER STN SPEC: NORMAL
STAT OF ADQ CVX/VAG CYTO-IMP: NORMAL

## 2024-02-05 ENCOUNTER — TELEPHONE (OUTPATIENT)
Dept: GASTROENTEROLOGY | Facility: CLINIC | Age: 59
End: 2024-02-05
Payer: COMMERCIAL

## 2024-02-05 ENCOUNTER — HOSPITAL ENCOUNTER (OUTPATIENT)
Dept: MRI IMAGING | Facility: HOSPITAL | Age: 59
Discharge: HOME | End: 2024-02-05
Admitting: NEUROLOGICAL SURGERY
Payer: COMMERCIAL

## 2024-02-05 DIAGNOSIS — I67.1 CEREBRAL ANEURYSM, NONRUPTURED: ICD-10-CM

## 2024-02-05 PROCEDURE — 70544 MR ANGIOGRAPHY HEAD W/O DYE: CPT

## 2024-02-05 NOTE — TELEPHONE ENCOUNTER
I CALLED THE PT TO SCHEDULE HER FOR HER YEARLY APPT FOR MEDICATIONS.  SHE IS SCHEDULED FOR 2/14 AND SHE IS NEEDING HER LINZESS REFILLED.

## 2024-02-07 ENCOUNTER — TELEPHONE (OUTPATIENT)
Dept: GASTROENTEROLOGY | Facility: CLINIC | Age: 59
End: 2024-02-07
Payer: COMMERCIAL

## 2024-02-07 NOTE — TELEPHONE ENCOUNTER
----- Message from OCTAVIO Nicolas sent at 2/5/2024  4:04 PM EST -----  I refilled her Linzess but looks like she is due for an annual follow-up.  Thanks Mayelin

## 2024-02-19 ENCOUNTER — OFFICE VISIT (OUTPATIENT)
Dept: GASTROENTEROLOGY | Facility: CLINIC | Age: 59
End: 2024-02-19
Payer: COMMERCIAL

## 2024-02-19 VITALS
TEMPERATURE: 96.8 F | SYSTOLIC BLOOD PRESSURE: 130 MMHG | WEIGHT: 119.3 LBS | BODY MASS INDEX: 20.37 KG/M2 | HEART RATE: 76 BPM | DIASTOLIC BLOOD PRESSURE: 85 MMHG | HEIGHT: 64 IN

## 2024-02-19 DIAGNOSIS — K59.04 CHRONIC IDIOPATHIC CONSTIPATION: Primary | ICD-10-CM

## 2024-02-19 DIAGNOSIS — Z86.010 PERSONAL HISTORY OF COLONIC POLYPS: ICD-10-CM

## 2024-02-19 DIAGNOSIS — K21.9 GASTROESOPHAGEAL REFLUX DISEASE, UNSPECIFIED WHETHER ESOPHAGITIS PRESENT: ICD-10-CM

## 2024-02-19 PROCEDURE — 99214 OFFICE O/P EST MOD 30 MIN: CPT | Performed by: PHYSICIAN ASSISTANT

## 2024-02-19 NOTE — PROGRESS NOTES
"Chief Complaint  Irritable Bowel Syndrome    Subjective          History Of Present Illness:    Iliana Reyes is a  58 y.o. female patient of Dr. Lyons who presents as a follow up for GERD and constipation.     Patient overall is currently doing well regard to her constipation.  She continues to take Linzess 145 mcg daily.  She reports normal formed bowel movements with this therapy.  She denies any abdominal pain or blood in the stool.  She does have some occasional heartburn but is no longer on PPI therapy.  She will use Tums as needed.  She denies any dysphagia, odynophagia, nausea, vomiting.  Appetite is good and weight is stable    Objective   Vital Signs:   /85   Pulse 76   Temp 96.8 °F (36 °C)   Ht 162.6 cm (64\")   Wt 54.1 kg (119 lb 4.8 oz)   BMI 20.48 kg/m²       Physical Exam  Vitals reviewed.   Constitutional:       General: She is not in acute distress.     Appearance: Normal appearance. She is not ill-appearing.   HENT:      Head: Normocephalic and atraumatic.      Nose: Nose normal.   Eyes:      Extraocular Movements: Extraocular movements intact.      Conjunctiva/sclera: Conjunctivae normal.      Pupils: Pupils are equal, round, and reactive to light.   Pulmonary:      Effort: Pulmonary effort is normal.   Musculoskeletal:         General: No swelling. Normal range of motion.      Cervical back: Normal range of motion.   Skin:     General: Skin is warm and dry.      Findings: No bruising or rash.   Neurological:      General: No focal deficit present.      Mental Status: She is alert and oriented to person, place, and time.      Motor: No weakness.      Gait: Gait normal.   Psychiatric:         Mood and Affect: Mood normal.          Result Review :   The following data was reviewed by: Leesa Geiger PA-C on 02/19/2024:  CMP          8/4/2023    10:33 10/26/2023    08:07   CMP   Glucose 73  88    BUN 7  10    Creatinine 0.62  0.54    EGFR 104.7     Sodium 138  140    Potassium 4.4  " 4.5    Chloride 101  101    Calcium 9.0  9.6    Total Protein  6.8    Albumin  5.0    Globulin  1.8    Total Bilirubin  1.0    Alkaline Phosphatase  65    AST (SGOT)  27    ALT (SGPT)  16    BUN/Creatinine Ratio 11.3  18.5    Anion Gap 8.9       CBC          8/4/2023    10:33 9/22/2023    12:09   CBC   WBC 4.98  7.34    RBC 4.61  4.53    Hemoglobin 13.7  14.0    Hematocrit 42.0  41.8    MCV 91.1  92.3    MCH 29.7  30.9    MCHC 32.6  33.5    RDW 11.0  11.8    Platelets 228  193            Assessment and Plan    Diagnoses and all orders for this visit:    1. Chronic idiopathic constipation (Primary)    2. Gastroesophageal reflux disease, unspecified whether esophagitis present    3. Personal history of colonic polyps  -     Case Request; Standing  -     Implement Anesthesia orders day of procedure.; Standing  -     Verify bowel prep was successful; Standing  -     Give tap water enema if bowel prep was insufficient; Standing  -     Prepare and Witness Surgical Consent Form; Standing  -     Case Request       Continue Linzess 145 mcg daily for constipation  Patient currently off PPI therapy without any significant issues.  Occasional GERD which she uses Tums for.  We did discuss she could try Pepcid if symptoms persist.  Patient does have a history of tubular adenoma colon polyps and is due for her screening colonoscopy this year.  Will go ahead and get this scheduled with Dr. Lyons.    Follow Up   Return for Colonoscopy.    Dragon dictation used throughout this note.            Leesa Goetz PA-C   [facility]  [address] [place] [address]  Office: [phone]

## 2024-03-11 ENCOUNTER — OUTSIDE FACILITY SERVICE (OUTPATIENT)
Dept: GASTROENTEROLOGY | Facility: CLINIC | Age: 59
End: 2024-03-11
Payer: COMMERCIAL

## 2024-03-11 PROCEDURE — 45378 DIAGNOSTIC COLONOSCOPY: CPT | Performed by: INTERNAL MEDICINE

## 2024-03-13 ENCOUNTER — TELEPHONE (OUTPATIENT)
Dept: GASTROENTEROLOGY | Facility: CLINIC | Age: 59
End: 2024-03-13
Payer: COMMERCIAL

## 2024-03-13 NOTE — TELEPHONE ENCOUNTER
Sent pt Sergeyt msg advising of results and recommendations. Advised to call if any questions.     Colonoscopy placed in  and recall for 3/11/29.

## 2024-03-21 RX ORDER — FLUTICASONE PROPIONATE 50 MCG
SPRAY, SUSPENSION (ML) NASAL EVERY 24 HOURS
COMMUNITY
Start: 2023-12-31

## 2024-03-21 NOTE — PROGRESS NOTES
Subjective   History of Present Illness: Iliana Reyes is a 58 y.o. female is here today for follow-up on left ophthalmic segment ICA aneurysm.  MRA head completed on 2/5/24.  She is doing well today.  She has no new complaints and denies any changes in the frequency or severity of her headaches.  Denies any change in speech or strokelike episodes.  No change in strength or sensation.      The following portions of the patient's history were reviewed and updated as appropriate: allergies, current medications, past family history, past medical history, past social history, past surgical history, and problem list.      Past Medical History:   Diagnosis Date    Aneurysm     OPTICAL-TODNAM    Leiva esophagus     Chest pain 2016    Gastric ulcer     GERD (gastroesophageal reflux disease)     History of migraine     Pyelonephritis 10/31/2022    Tubular adenoma of colon         Past Surgical History:   Procedure Laterality Date    BREAST BIOPSY      BREAST LUMPECTOMY Right 8/10/2023    Procedure: RIGHT breast ANALI guided excision atypical hyperplasia;  Surgeon: Alissa Head MD;  Location: [facility];  Service: General;  Laterality: Right;    CHOLECYSTECTOMY      Dr. EDER Alba    COLONOSCOPY  05/18/2016    polyp, tics, tubular adenoma    COLONOSCOPY  06/05/2019    normal ileum, diverticulosis, NBIH, TAx1    ENDOSCOPY  05/18/2016    gastric ulcer w/clean base, acute gastritis. Leiva's esophagus    ENDOSCOPY  06/05/2019    normal esophagus/duodenum, acute gastritis    HYSTERECTOMY      OOPHORECTOMY      SHOULDER ARTHROSCOPY Left     Dr. Plunkett    UPPER GASTROINTESTINAL ENDOSCOPY  05/18/2016          Current Outpatient Medications:     acetaminophen (TYLENOL) 325 MG tablet, Take 2 tablets by mouth Every 6 (Six) Hours As Needed for Mild Pain., Disp: , Rfl:     Aimovig 140 MG/ML auto-injector, Inject 1 mL under the skin into the appropriate area as directed Every 30 (Thirty) Days., Disp: 1.12 mL, Rfl: 12     Calcium-Magnesium-Vitamin D (CALCIUM 500 PO), Take 1 tablet by mouth Daily., Disp: , Rfl:     cholecalciferol (VITAMIN D3) 25 MCG (1000 UT) tablet, Take 1 tablet by mouth Daily., Disp: , Rfl:     diphenhydrAMINE (BENADRYL) 25 mg, Take 1 capsule by mouth Every 6 (Six) Hours As Needed for Itching or Allergies., Disp: , Rfl:     fluticasone (FLONASE) 50 MCG/ACT nasal spray, Daily., Disp: , Rfl:     linaclotide (Linzess) 145 MCG capsule capsule, Take 1 capsule by mouth Every Morning Before Breakfast., Disp: 90 capsule, Rfl: 3    methylPREDNISolone (MEDROL) 4 MG dose pack, Take as directed on package instructions., Disp: 21 tablet, Rfl: 0    Restasis 0.05 % ophthalmic emulsion, Administer 1 drop to both eyes 2 (Two) Times a Day., Disp: , Rfl:     tamoxifen (NOLVADEX) 10 MG tablet, Take 0.5 tablets by mouth Daily., Disp: 45 tablet, Rfl: 3    venlafaxine XR (Effexor XR) 75 MG 24 hr capsule, Take 1 capsule by mouth Daily., Disp: 30 capsule, Rfl: 3     Allergies   Allergen Reactions    Amitiza [Lubiprostone] Other (See Comments)     Migraines    Codeine Hives and GI Intolerance    Pantoprazole Nausea And Vomiting        Social History     Socioeconomic History    Marital status:    Tobacco Use    Smoking status: Former     Current packs/day: 0.00     Average packs/day: 1 pack/day for 20.0 years (20.0 ttl pk-yrs)     Types: Cigarettes     Start date:      Quit date: 2016     Years since quittin.2    Smokeless tobacco: Never   Vaping Use    Vaping status: Never Used   Substance and Sexual Activity    Alcohol use: Yes     Alcohol/week: 4.0 standard drinks of alcohol     Types: 4 Glasses of wine per week     Comment: 4-6 PER WK    Drug use: No    Sexual activity: Yes     Partners: Male     Birth control/protection: None        Family History   Problem Relation Age of Onset    Heart disease Mother     Inflammatory bowel disease Mother     Colon cancer Mother     Colon polyps Mother     Liver cancer Mother      Ulcerative colitis Mother     Cerebral aneurysm Father     Crohn's disease Brother     Colon polyps Brother     Heart attack Brother 48    Heart attack Brother 40    Crohn's disease Cousin     Crohn's disease Cousin     Learning disabilities Neg Hx         Review of Systems   Eyes:  Negative for visual disturbance.   Neurological:  Negative for dizziness, syncope, speech difficulty, light-headedness, numbness and headaches.   All other systems reviewed and are negative.      Objective     Vitals:    24 0956   BP: 118/70   Pulse: 84   Temp: 98 °F (36.7 °C)   SpO2: 100%   Weight: 53.6 kg (118 lb 3.2 oz)     Body mass index is 20.29 kg/m².        Physical exam  Awake, alert, oriented x3  Pupils equal round reactive to light  Extraocular muscles intact  Face symmetric  Speech is fluent and clear  No pronator drift  Motor exam  Bilateral deltoids 5/5, bilateral biceps 5/5, bilateral triceps 5/5, bilateral wrist extension 5/5 bilateral hand  5/5  Bilateral hip flexion 5/5, bilateral knee extension 5/5, bilateral DF/PF 5/5  gait independent  Able to detect  light touch in all 4 extremities        Assessment & Plan   Independent Review of Radiographic Studies:      I personally reviewed the images from the following studies.    MRA head  IMPRESSION:  A left ophthalmic segment aneurysm measuring approximately 3mm in size is appreciated which appears similar to the prior examinations given differences in technique. The left ophthalmic artery appears to arise from the anterior aspect of the aneurysm.    Medical Decision Makin-year-old female with a 3 mm left ophthalmic segment ICA aneurysm    She is doing well today, no growth on follow-up imaging.  She denies smoking cigarettes and blood pressure is controlled.  Plan on seeing her back in 1 year with repeat MRA.  She does have family history of intracranial aneurysm.    Diagnoses and all orders for this visit:    1. Cerebral aneurysm without rupture  (Primary)  -     MRI Angiogram Head With & Without Contrast; Future      Return in about 1 year (around 3/26/2025).      I spent 35 minutes caring for Iliana Reyes on this date of service. This time includes time spent by me in the following activities: preparing for the visit, reviewing tests, obtaining and/or reviewing a separately obtained history, performing a medically appropriate examination and/or evaluation, counseling and educating the patient/family/caregiver, ordering medications, tests, or procedures, referring and communicating with other health care professionals, documenting information in the medical record, independently interpreting results and communicating that information with the patient/family/caregiver, and care coordination

## 2024-03-21 NOTE — PROGRESS NOTES
"Subjective   Chief Complaint   Patient presents with    Sciatica       History of Present Illness   58 y.o. female presents with cc right sided sciatica ongoing times 4-6 weeks. Denies back pain. Reports pain is in her right glut and hip radiating to beginning of her arch. Started with muscle cramps in her right calf. Right foot started falling asleep next. Continues exercises she learned at PT in 2017 regularly without relief. Sitting at an angle takes the pressure off her hip and glut helps. \"Almost feels like a Dennis horse.\"Pain is worse if she rubs her leg \"it feels like a bolt of lightening.\" Walking at night helps. Her chiropractor adjusted her a couple of days ago without improvement. Denies lower extremity weakness, B/B incontinence or saddle anesthesia. She has not tried any OTC medication for it yet.     Patient Active Problem List   Diagnosis    Cerebral aneurysm without rupture    History of migraine    Irritable bowel syndrome with constipation    Dry eyes    Atypical ductal hyperplasia of right breast       Allergies   Allergen Reactions    Amitiza [Lubiprostone] Other (See Comments)     Migraines    Codeine Hives and GI Intolerance    Pantoprazole Nausea And Vomiting       Current Outpatient Medications on File Prior to Visit   Medication Sig Dispense Refill    acetaminophen (TYLENOL) 325 MG tablet Take 2 tablets by mouth Every 6 (Six) Hours As Needed for Mild Pain.      Aimovig 140 MG/ML auto-injector Inject 1 mL under the skin into the appropriate area as directed Every 30 (Thirty) Days. 1.12 mL 12    Calcium-Magnesium-Vitamin D (CALCIUM 500 PO) Take 1 tablet by mouth Daily.      cholecalciferol (VITAMIN D3) 25 MCG (1000 UT) tablet Take 1 tablet by mouth Daily.      diphenhydrAMINE (BENADRYL) 25 mg Take 1 capsule by mouth Every 6 (Six) Hours As Needed for Itching or Allergies.      fluticasone (FLONASE) 50 MCG/ACT nasal spray Daily.      linaclotide (Linzess) 145 MCG capsule capsule Take 1 capsule " by mouth Every Morning Before Breakfast. 90 capsule 3    Restasis 0.05 % ophthalmic emulsion Administer 1 drop to both eyes 2 (Two) Times a Day.      tamoxifen (NOLVADEX) 10 MG tablet Take 0.5 tablets by mouth Daily. 45 tablet 3    venlafaxine XR (Effexor XR) 75 MG 24 hr capsule Take 1 capsule by mouth Daily. 30 capsule 3     No current facility-administered medications on file prior to visit.       Past Medical History:   Diagnosis Date    Aneurysm     OPTICAL-TODNAM    Leiva esophagus     Chest pain     Gastric ulcer     GERD (gastroesophageal reflux disease)     History of migraine     Pyelonephritis 10/31/2022    Tubular adenoma of colon        Family History   Problem Relation Age of Onset    Heart disease Mother     Inflammatory bowel disease Mother     Colon cancer Mother     Colon polyps Mother     Liver cancer Mother     Ulcerative colitis Mother     Cerebral aneurysm Father     Crohn's disease Brother     Colon polyps Brother     Heart attack Brother 48    Heart attack Brother 40    Crohn's disease Cousin     Crohn's disease Cousin     Learning disabilities Neg Hx        Social History     Socioeconomic History    Marital status:    Tobacco Use    Smoking status: Former     Current packs/day: 0.00     Average packs/day: 1 pack/day for 20.0 years (20.0 ttl pk-yrs)     Types: Cigarettes     Start date:      Quit date:      Years since quittin.2    Smokeless tobacco: Never   Vaping Use    Vaping status: Never Used   Substance and Sexual Activity    Alcohol use: Yes     Alcohol/week: 4.0 standard drinks of alcohol     Types: 4 Glasses of wine per week     Comment: 4-6 PER WK    Drug use: No    Sexual activity: Yes     Partners: Male     Birth control/protection: None       Past Surgical History:   Procedure Laterality Date    BREAST BIOPSY      BREAST LUMPECTOMY Right 8/10/2023    Procedure: RIGHT breast ANALI guided excision atypical hyperplasia;  Surgeon: Alissa Head MD;   "Location: [facility] OR;  Service: General;  Laterality: Right;    CHOLECYSTECTOMY      Dr. EDER Alba    COLONOSCOPY  05/18/2016    polyp, tics, tubular adenoma    COLONOSCOPY  06/05/2019    normal ileum, diverticulosis, NBIH, TAx1    ENDOSCOPY  05/18/2016    gastric ulcer w/clean base, acute gastritis. Leiva's esophagus    ENDOSCOPY  06/05/2019    normal esophagus/duodenum, acute gastritis    HYSTERECTOMY      OOPHORECTOMY      SHOULDER ARTHROSCOPY Left     Dr. Plunkett    UPPER GASTROINTESTINAL ENDOSCOPY  05/18/2016       The following portions of the patient's history were reviewed and updated as appropriate: problem list, allergies, current medications, past medical history, and past social history.    Review of Systems    Immunization History   Administered Date(s) Administered    COVID-19 (PFIZER) Purple Cap Monovalent 03/27/2021, 04/19/2021, 12/07/2021    Flu Vaccine Intradermal Quad 18-64YR 04/21/2022    Flu Vaccine Quad PF >36MO 10/14/2020    Flublok 18+yrs 04/21/2022    Fluzone (or Fluarix & Flulaval for VFC) >6mos 10/14/2020, 10/14/2021, 10/27/2022    Hepatitis A 05/05/2018    Influenza Injectable Mdck Pf Quad 10/27/2022    Influenza Quad Vaccine (Inpatient) 09/28/2019    Pneumococcal Polysaccharide (PPSV23) 02/21/2022    Tdap 01/23/2020       Objective   Vitals:    03/22/24 1407   Weight: 53.8 kg (118 lb 9.6 oz)   Height: 162.6 cm (64\")     Body mass index is 20.36 kg/m².  Physical Exam  Constitutional:       Appearance: Normal appearance. She is normal weight.   HENT:      Head: Normocephalic and atraumatic.   Pulmonary:      Effort: Pulmonary effort is normal.   Musculoskeletal:      Comments: FROM lumbar spine and right hip. Negative SLR.    Skin:     General: Skin is warm and dry.   Neurological:      Mental Status: She is alert.      Motor: No weakness.      Deep Tendon Reflexes: Reflexes normal.   Psychiatric:         Mood and Affect: Mood normal.         Behavior: Behavior normal. "         Procedures    Assessment & Plan   Diagnoses and all orders for this visit:    1. Sciatica, right side (Primary)  Comments:  Imaging as below. Medrol dose pack and once complete then Aleve 2 tabs bid x 2 weeks. Discussed PT.  Orders:  -     XR Spine Lumbar 2 or 3 View  -     methylPREDNISolone (MEDROL) 4 MG dose pack; Take as directed on package instructions.  Dispense: 21 tablet; Refill: 0    2. Right hip pain  Comments:  Imaging as below. Medrol dose pack and once complete then Aleve 2 tabs bid x 2 weeks. Discussed PT.  Orders:  -     XR Hip With or Without Pelvis 2 - 3 View Right    3. Atypical ductal hyperplasia of right breast  Comments:  Follow up visit today with Dr. Guillen. Continues Tamoxifen and alternates MMG/MRI every 6M.    Records reviewed include previous OV with myself and Dr. Guillen as well as labs.     Return in about 7 months (around 11/4/2024) for Annual physical, Lab B4 FUP.

## 2024-03-22 ENCOUNTER — HOSPITAL ENCOUNTER (OUTPATIENT)
Facility: HOSPITAL | Age: 59
Discharge: HOME | End: 2024-03-22
Payer: COMMERCIAL

## 2024-03-22 ENCOUNTER — OFFICE VISIT (OUTPATIENT)
Dept: INTERNAL MEDICINE | Facility: CLINIC | Age: 59
End: 2024-03-22
Payer: COMMERCIAL

## 2024-03-22 ENCOUNTER — OFFICE VISIT (OUTPATIENT)
Dept: ONCOLOGY | Facility: CLINIC | Age: 59
End: 2024-03-22
Payer: COMMERCIAL

## 2024-03-22 VITALS
BODY MASS INDEX: 20.28 KG/M2 | RESPIRATION RATE: 18 BRPM | HEART RATE: 76 BPM | SYSTOLIC BLOOD PRESSURE: 131 MMHG | DIASTOLIC BLOOD PRESSURE: 83 MMHG | HEIGHT: 64 IN | OXYGEN SATURATION: 99 % | TEMPERATURE: 97.3 F | WEIGHT: 118.8 LBS

## 2024-03-22 VITALS — HEIGHT: 64 IN | BODY MASS INDEX: 20.25 KG/M2 | WEIGHT: 118.6 LBS

## 2024-03-22 DIAGNOSIS — N60.91 ATYPICAL DUCTAL HYPERPLASIA OF RIGHT BREAST: ICD-10-CM

## 2024-03-22 DIAGNOSIS — M25.551 RIGHT HIP PAIN: ICD-10-CM

## 2024-03-22 DIAGNOSIS — N60.91 ATYPICAL DUCTAL HYPERPLASIA OF RIGHT BREAST: Primary | ICD-10-CM

## 2024-03-22 DIAGNOSIS — M54.31 SCIATICA, RIGHT SIDE: Primary | ICD-10-CM

## 2024-03-22 PROCEDURE — 73502 X-RAY EXAM HIP UNI 2-3 VIEWS: CPT

## 2024-03-22 PROCEDURE — 72100 X-RAY EXAM L-S SPINE 2/3 VWS: CPT

## 2024-03-22 PROCEDURE — 99214 OFFICE O/P EST MOD 30 MIN: CPT | Performed by: INTERNAL MEDICINE

## 2024-03-22 PROCEDURE — 99214 OFFICE O/P EST MOD 30 MIN: CPT | Performed by: NURSE PRACTITIONER

## 2024-03-22 RX ORDER — METHYLPREDNISOLONE 4 MG/1
TABLET ORAL
Qty: 21 TABLET | Refills: 0 | Status: SHIPPED | OUTPATIENT
Start: 2024-03-22

## 2024-03-22 NOTE — PROGRESS NOTES
Subjective   Iliana Reyes is a 58 y.o. female.  Referred by Dr. Head for right breast atypical ductal hyperplasia, focal    History of Present Illness   Ms. Reyes is a 57-year-old postmenopausal  lady who has been on hormone replacement therapy with estradiol patch for 15 years presents with a palpable abnormality of the right breast.    1/9/2023-bilateral screening mammogram benign.    3/2/2023-right breast diagnostic mammogram after palpating a right breast mass for about 6 to 8 weeks.  No suspicious focal mammographic abnormalities are identified deep to the marker.  No suspicious masses, calcifications or areas of architectural distortion    Ultrasound  At 3:00, 3 cm from the nipple and 4:00, 1 to 2 cm from the nipple ultrasound was performed.  At 4:00, 2 cm from the nipple there is approximately 0.6 time 0.5 x 0.5 cm masslike area with indistinct margins which may reflect focal dense fibroglandular tissue but is suspicious.  At 4:00, 1 cm from the nipple there is a 0.4 cm benign-appearing cyst.    Impression  Suspicious 0.6 cm masslike area at 4:00 in the right breast.  Ultrasound-guided core needle biopsy recommended.    3/29/2023-ultrasound-guided core needle biopsy  Pathology consistent with rare minute focus of cribriform atypical ductal hyperplasia.  Fibroadenomatoid hyperplasia, focally clustered ductal cyst.  No carcinoma in situ.    8/10/2023-right breast lumpectomy  Fibrocystic change, apocrine cyst, adenosis and usual ductal hyperplasia and cautery artifact.  No residual atypical hyperplasia carcinoma in situ or invasive carcinoma.    Bilateral breast MRI 6/28/2023-non-mass enhancement measuring 1.2 cm at 4:00 middle right breast with a focus of susceptibility from biopsy clip marking the site of biopsy-proven atypia.  Recommend surgical management  No MRI evidence of malignancy in the left breast.    Tamoxifen started January 2024    Interval history  Ms. Reyes presents today for  follow-up  She is tolerating the low-dose tamoxifen well.  Hot flashes well-controlled on Effexor.  Denies any new breast masses.  She does have occasional pain in the right breast at the site of surgery.  1/30/2024-screening mammogram negative    The following portions of the patient's history were reviewed and updated as appropriate: allergies, current medications, past family history, past medical history, past social history, past surgical history, and problem list.    Past Medical History:   Diagnosis Date    Aneurysm     OPTICAL-TODNAM    Leiva esophagus     Chest pain 2016    Gastric ulcer     GERD (gastroesophageal reflux disease)     History of migraine     Pyelonephritis 10/31/2022    Tubular adenoma of colon         Past Surgical History:   Procedure Laterality Date    BREAST BIOPSY      BREAST LUMPECTOMY Right 8/10/2023    Procedure: RIGHT breast ANALI guided excision atypical hyperplasia;  Surgeon: Alissa Head MD;  Location: [facility];  Service: General;  Laterality: Right;    CHOLECYSTECTOMY      Dr. EDER Alba    COLONOSCOPY  05/18/2016    polyp, tics, tubular adenoma    COLONOSCOPY  06/05/2019    normal ileum, diverticulosis, NBIH, TAx1    ENDOSCOPY  05/18/2016    gastric ulcer w/clean base, acute gastritis. Leiva's esophagus    ENDOSCOPY  06/05/2019    normal esophagus/duodenum, acute gastritis    HYSTERECTOMY      OOPHORECTOMY      SHOULDER ARTHROSCOPY Left     Dr. Plunkett    UPPER GASTROINTESTINAL ENDOSCOPY  05/18/2016        Family History   Problem Relation Age of Onset    Heart disease Mother     Inflammatory bowel disease Mother     Colon cancer Mother     Colon polyps Mother     Liver cancer Mother     Ulcerative colitis Mother     Cerebral aneurysm Father     Crohn's disease Brother     Colon polyps Brother     Heart attack Brother 48    Heart attack Brother 40    Crohn's disease Cousin     Crohn's disease Cousin     Learning disabilities Neg Hx         Social History  "    Socioeconomic History    Marital status:    Tobacco Use    Smoking status: Former     Current packs/day: 0.00     Average packs/day: 1 pack/day for 20.0 years (20.0 ttl pk-yrs)     Types: Cigarettes     Start date:      Quit date:      Years since quittin.2    Smokeless tobacco: Never   Vaping Use    Vaping status: Never Used   Substance and Sexual Activity    Alcohol use: Yes     Alcohol/week: 4.0 standard drinks of alcohol     Types: 4 Glasses of wine per week     Comment: 4-6 PER WK    Drug use: No    Sexual activity: Yes     Partners: Male     Birth control/protection: None        OB History          2    Para   2    Term   2            AB        Living             SAB        IAB        Ectopic        Molar        Multiple        Live Births   2             Age at menarche-14  Age at first live childbirth-21   2 para 2  0  Underwent a hysterectomy and salpingo-oophorectomy at the age of 42 for abnormal Pap smears.  Hormone replacement therapy-15 years with estradiol patch    Allergies   Allergen Reactions    Amitiza [Lubiprostone] Other (See Comments)     Migraines    Codeine Hives and GI Intolerance    Pantoprazole Nausea And Vomiting            Review of Systems   Constitutional: Negative.    HENT: Negative.     Eyes: Negative.    Respiratory: Negative.     Cardiovascular: Negative.    Gastrointestinal: Negative.    Endocrine: Negative.    Genitourinary: Negative.    Musculoskeletal: Negative.    Skin: Negative.    Allergic/Immunologic: Negative.    Neurological: Negative.    Hematological: Negative.    Psychiatric/Behavioral: Negative.       Review of systems as mentioned HPI otherwise negative    Objective   Blood pressure 131/83, pulse 76, temperature 97.3 °F (36.3 °C), temperature source Temporal, resp. rate 18, height 162.6 cm (64.02\"), weight 53.9 kg (118 lb 12.8 oz), SpO2 99%, not currently breastfeeding.   Physical Exam  Vitals reviewed. "   Constitutional:       Appearance: Normal appearance.   HENT:      Head: Normocephalic and atraumatic.      Right Ear: External ear normal.      Left Ear: External ear normal.      Nose: Nose normal.      Mouth/Throat:      Pharynx: Oropharynx is clear.   Eyes:      Conjunctiva/sclera: Conjunctivae normal.   Cardiovascular:      Rate and Rhythm: Normal rate.   Pulmonary:      Effort: Pulmonary effort is normal.   Abdominal:      General: Abdomen is flat.   Musculoskeletal:         General: Normal range of motion.   Skin:     General: Skin is warm.   Neurological:      Mental Status: She is alert.   Psychiatric:         Mood and Affect: Mood normal.         Behavior: Behavior normal.         Thought Content: Thought content normal.         Judgment: Judgment normal.       Breast Exam: Right breast on inspection there is a scar in the periareolar region which is healing well.  Tender to palpation.  No new palpable abnormalities.  Left breast appears normal inspection.  No palpable abnormalities of the left breast      I have reexamined the patient and the results are consistent with the previously documented exam. Amada Guillen MD      No visits with results within 30 Day(s) from this visit.   Latest known visit with results is:   Office Visit on 01/29/2024   Component Date Value Ref Range Status    Diagnosis 01/29/2024 Comment   Final    Comment: NEGATIVE FOR INTRAEPITHELIAL LESION OR MALIGNANCY.  CELLULAR CHANGES ASSOCIATED WITH INFLAMMATION ARE PRESENT.      Specimen adequacy: 01/29/2024 Comment   Final    Satisfactory for evaluation.    Clinician Provided ICD-10: 01/29/2024 Comment   Final    Z12.4    Performed by: 01/29/2024 Comment   Final    Nikolay Grant, Cytotechnologist ([facility])    QC reviewed by: 01/29/2024 Comment   Final    Lidya Cartagena, Supervisory Cytotechnologist ([facility])    . 01/29/2024 .   Final    Note: 01/29/2024 Comment   Final    Comment: The Pap smear is a screening test designed to aid in  the detection of  premalignant and malignant conditions of the uterine cervix.  It is not a  diagnostic procedure and should not be used as the sole means of detecting  cervical cancer.  Both false-positive and false-negative reports do occur.      Method: 01/29/2024 Comment   Final    Comment: This liquid based ThinPrep(R) pap test was screened with the  use of an image guided system.      Conv .conv 01/29/2024 Comment   Final    Comment: The HPV DNA reflex criteria were not met with this specimen result  therefore, no HPV testing was performed.          No radiology results for the last 30 days.       Assessment & Plan       *Focal atypical ductal hyperplasia  Discussed increased lifetime risk of breast cancer associated with ADH.  Given that the ADH is focal risk is likely on the low side.  20-year is likely around 24%.  She has discontinued the estradiol patch.  Started tamoxifen 5 mg in January 2024  Tolerating that well.    *Hormone replacement therapy  Patient has been on estradiol patch for almost 15 years.  This has been stopped.  Significant hot flashes  Hot flashes well-controlled on Effexor 75 mg daily    *Hot flashes-continue Effexor 75 mg daily, well-controlled    *Risk reduction  Continue low-dose tamoxifen    *Surveillance  Continue annual mammograms and MRIs  Mammogram January 29, 2024-reviewed and benign  MRI due July 2024, ordered and scheduled today    *[id number] in 6 months and MD in 1 year    Patient is on medications requiring close monitoring for toxicities.

## 2024-03-22 NOTE — PATIENT INSTRUCTIONS
You are advised to use protection when having sex.  You are advised to use protection when having sex.

## 2024-03-26 ENCOUNTER — OFFICE VISIT (OUTPATIENT)
Dept: NEUROSURGERY | Facility: CLINIC | Age: 59
End: 2024-03-26
Payer: COMMERCIAL

## 2024-03-26 VITALS
HEART RATE: 84 BPM | BODY MASS INDEX: 20.29 KG/M2 | TEMPERATURE: 98 F | OXYGEN SATURATION: 100 % | SYSTOLIC BLOOD PRESSURE: 118 MMHG | WEIGHT: 118.2 LBS | DIASTOLIC BLOOD PRESSURE: 70 MMHG

## 2024-03-26 DIAGNOSIS — I67.1 CEREBRAL ANEURYSM WITHOUT RUPTURE: Primary | ICD-10-CM

## 2024-03-28 DIAGNOSIS — M54.41 CHRONIC RIGHT-SIDED LOW BACK PAIN WITH RIGHT-SIDED SCIATICA: Primary | ICD-10-CM

## 2024-03-28 DIAGNOSIS — G89.29 CHRONIC RIGHT-SIDED LOW BACK PAIN WITH RIGHT-SIDED SCIATICA: Primary | ICD-10-CM

## 2024-04-10 RX ORDER — VENLAFAXINE HYDROCHLORIDE 75 MG/1
75 CAPSULE, EXTENDED RELEASE ORAL DAILY
Qty: 30 CAPSULE | Refills: 3 | Status: SHIPPED | OUTPATIENT
Start: 2024-04-10

## 2024-04-10 RX ORDER — VENLAFAXINE HYDROCHLORIDE 75 MG/1
75 CAPSULE, EXTENDED RELEASE ORAL DAILY
Qty: 30 CAPSULE | Refills: 3 | OUTPATIENT
Start: 2024-04-10

## 2024-04-18 ENCOUNTER — HOSPITAL ENCOUNTER (OUTPATIENT)
Dept: MRI IMAGING | Facility: HOSPITAL | Age: 59
Discharge: HOME | End: 2024-04-18
Admitting: NURSE PRACTITIONER
Payer: COMMERCIAL

## 2024-04-18 DIAGNOSIS — G89.29 CHRONIC RIGHT-SIDED LOW BACK PAIN WITH RIGHT-SIDED SCIATICA: ICD-10-CM

## 2024-04-18 DIAGNOSIS — M54.41 CHRONIC RIGHT-SIDED LOW BACK PAIN WITH RIGHT-SIDED SCIATICA: ICD-10-CM

## 2024-04-18 PROCEDURE — 72148 MRI LUMBAR SPINE W/O DYE: CPT

## 2024-05-08 DIAGNOSIS — G43.009 MIGRAINE WITHOUT AURA AND WITHOUT STATUS MIGRAINOSUS, NOT INTRACTABLE: ICD-10-CM

## 2024-05-08 RX ORDER — ERENUMAB-AOOE 140 MG/ML
INJECTION, SOLUTION SUBCUTANEOUS
Qty: 1 ML | Refills: 1 | Status: SHIPPED | OUTPATIENT
Start: 2024-05-08

## 2024-06-24 DIAGNOSIS — G43.009 MIGRAINE WITHOUT AURA AND WITHOUT STATUS MIGRAINOSUS, NOT INTRACTABLE: ICD-10-CM

## 2024-06-24 RX ORDER — ERENUMAB-AOOE 140 MG/ML
140 INJECTION, SOLUTION SUBCUTANEOUS
Qty: 1 ML | Refills: 2 | Status: SHIPPED | OUTPATIENT
Start: 2024-06-24

## 2024-07-02 DIAGNOSIS — G43.009 MIGRAINE WITHOUT AURA AND WITHOUT STATUS MIGRAINOSUS, NOT INTRACTABLE: ICD-10-CM

## 2024-07-02 RX ORDER — ERENUMAB-AOOE 140 MG/ML
140 INJECTION, SOLUTION SUBCUTANEOUS
Qty: 1 ML | Refills: 2 | Status: SHIPPED | OUTPATIENT
Start: 2024-07-02

## 2024-07-22 DIAGNOSIS — G43.009 MIGRAINE WITHOUT AURA AND WITHOUT STATUS MIGRAINOSUS, NOT INTRACTABLE: ICD-10-CM

## 2024-07-23 RX ORDER — ERENUMAB-AOOE 140 MG/ML
140 INJECTION, SOLUTION SUBCUTANEOUS
Qty: 1 ML | Refills: 2 | Status: CANCELLED | OUTPATIENT
Start: 2024-07-23

## 2024-07-23 NOTE — TELEPHONE ENCOUNTER
Caller: AMAIRANI    Relationship: INSURANCE CARRIER    Best call back number: [phone]    What is the best time to reach you: ANY    Who are you requesting to speak with (clinical staff, provider,  specific staff member): CLINICAL    Do you know the name of the person who called: AMAIRANI    What was the call regarding: AMAIRANI CALLING TO CONFIRM PATIENT DOES HAVE INSURANCE THROUGH [facility].    AMAIRANI CONFIRMED PATIENT CAN GO TO ANY PHARMACY FOR MEDICATION.    AMAIRANI WAS CALLING TO HELP GET THE PA THROUGH.  AMAIRANI STATED THEY GO THROUGH A THIRD PARTY IF PA IS SUBMITTED ONLINE [employer] IS THE NAME OF THE THIRD PARTY.    REF NUMBER [id number]    Is it okay if the provider responds through [employer]: NO

## 2024-07-26 ENCOUNTER — HOSPITAL ENCOUNTER (OUTPATIENT)
Dept: MRI IMAGING | Facility: HOSPITAL | Age: 59
Discharge: HOME | End: 2024-07-26
Admitting: INTERNAL MEDICINE
Payer: COMMERCIAL

## 2024-07-26 DIAGNOSIS — N60.91 ATYPICAL DUCTAL HYPERPLASIA OF RIGHT BREAST: ICD-10-CM

## 2024-07-26 PROCEDURE — A9577 INJ MULTIHANCE: HCPCS | Performed by: INTERNAL MEDICINE

## 2024-07-26 PROCEDURE — 0 GADOBENATE DIMEGLUMINE 529 MG/ML SOLUTION: Performed by: INTERNAL MEDICINE

## 2024-07-26 PROCEDURE — 77049 MRI BREAST C-+ W/CAD BI: CPT

## 2024-07-26 RX ADMIN — GADOBENATE DIMEGLUMINE 10 ML: 529 INJECTION, SOLUTION INTRAVENOUS at 13:17

## 2024-07-30 ENCOUNTER — TELEPHONE (OUTPATIENT)
Dept: INTERNAL MEDICINE | Facility: CLINIC | Age: 59
End: 2024-07-30
Payer: COMMERCIAL

## 2024-07-30 DIAGNOSIS — G43.009 MIGRAINE WITHOUT AURA AND WITHOUT STATUS MIGRAINOSUS, NOT INTRACTABLE: ICD-10-CM

## 2024-07-30 RX ORDER — ERENUMAB-AOOE 140 MG/ML
140 INJECTION, SOLUTION SUBCUTANEOUS
Qty: 1 ML | Refills: 2 | Status: SHIPPED | OUTPATIENT
Start: 2024-07-30

## 2024-07-30 NOTE — TELEPHONE ENCOUNTER
Caller: Iliana Reyes    Relationship: Self    Best call back number:[phone]     Which medication are you concerned about:   Aimovig 140 MG/ML auto-injector     Who prescribed you this medication:   Minda Sanchez APRN     When did you start taking this medication: NEVER STARTED  What are your concerns:   PRIOR AUTHORIZATION FROM INSURANCE.  SHE NEEDS THIS MEDICATION AND HAS NOT BEEN ABLE TO OBTAIN THIS IN 5 WEEKS      PHARMACY:   [facility] [id number] - [address] - [phone]  - [phone]

## 2024-07-31 ENCOUNTER — PATIENT MESSAGE (OUTPATIENT)
Dept: INTERNAL MEDICINE | Facility: CLINIC | Age: 59
End: 2024-07-31
Payer: COMMERCIAL

## 2024-08-09 RX ORDER — VENLAFAXINE HYDROCHLORIDE 75 MG/1
75 CAPSULE, EXTENDED RELEASE ORAL DAILY
Qty: 30 CAPSULE | Refills: 3 | Status: SHIPPED | OUTPATIENT
Start: 2024-08-09

## 2024-10-02 NOTE — PROGRESS NOTES
Subjective   Iliana Reyes is a 58 y.o. female.  Referred by Dr. Head for right breast atypical ductal hyperplasia, focal    History of Present Illness   Ms. Reyes is a 57-year-old postmenopausal  lady who has been on hormone replacement therapy with estradiol patch for 15 years presents with a palpable abnormality of the right breast.    1/9/2023-bilateral screening mammogram benign.    3/2/2023-right breast diagnostic mammogram after palpating a right breast mass for about 6 to 8 weeks.  No suspicious focal mammographic abnormalities are identified deep to the marker.  No suspicious masses, calcifications or areas of architectural distortion    Ultrasound  At 3:00, 3 cm from the nipple and 4:00, 1 to 2 cm from the nipple ultrasound was performed.  At 4:00, 2 cm from the nipple there is approximately 0.6 time 0.5 x 0.5 cm masslike area with indistinct margins which may reflect focal dense fibroglandular tissue but is suspicious.  At 4:00, 1 cm from the nipple there is a 0.4 cm benign-appearing cyst.    Impression  Suspicious 0.6 cm masslike area at 4:00 in the right breast.  Ultrasound-guided core needle biopsy recommended.    3/29/2023-ultrasound-guided core needle biopsy  Pathology consistent with rare minute focus of cribriform atypical ductal hyperplasia.  Fibroadenomatoid hyperplasia, focally clustered ductal cyst.  No carcinoma in situ.    8/10/2023-right breast lumpectomy  Fibrocystic change, apocrine cyst, adenosis and usual ductal hyperplasia and cautery artifact.  No residual atypical hyperplasia carcinoma in situ or invasive carcinoma.    Bilateral breast MRI 6/28/2023-non-mass enhancement measuring 1.2 cm at 4:00 middle right breast with a focus of susceptibility from biopsy clip marking the site of biopsy-proven atypia.  Recommend surgical management  No MRI evidence of malignancy in the left breast.    Tamoxifen started January 2024    Interval history  Iliana returns today for follow  up.  She continues on tamoxifen 5 mg daily.  She continues with hot flashes, despite Effexor 75 mg daily.  She is having hot flashes multiple times a day, occasionally they are drenching.  She does still feel that hot flashes are tolerable.  She continues with tenderness in her right breast at the site of surgery, this is remained stable.  Denies cough/shortness of breath, nausea, vomiting, abdominal pain, new breast mass.    The following portions of the patient's history were reviewed and updated as appropriate: allergies, current medications, past family history, past medical history, past social history, past surgical history, and problem list.    Past Medical History:   Diagnosis Date    Aneurysm     OPTICAL-TODNAM    Leiva esophagus     Chest pain 2016    Gastric ulcer     GERD (gastroesophageal reflux disease)     History of migraine     Pyelonephritis 10/31/2022    Tubular adenoma of colon         Past Surgical History:   Procedure Laterality Date    BREAST BIOPSY      BREAST LUMPECTOMY Right 8/10/2023    Procedure: RIGHT breast ANALI guided excision atypical hyperplasia;  Surgeon: Alissa Head MD;  Location: [facility];  Service: General;  Laterality: Right;    CHOLECYSTECTOMY      Dr. EDER Alba    COLONOSCOPY  05/18/2016    polyp, tics, tubular adenoma    COLONOSCOPY  06/05/2019    normal ileum, diverticulosis, NBIH, TAx1    ENDOSCOPY  05/18/2016    gastric ulcer w/clean base, acute gastritis. Leiva's esophagus    ENDOSCOPY  06/05/2019    normal esophagus/duodenum, acute gastritis    HYSTERECTOMY      OOPHORECTOMY      SHOULDER ARTHROSCOPY Left     Dr. Plunkett    UPPER GASTROINTESTINAL ENDOSCOPY  05/18/2016        Family History   Problem Relation Age of Onset    Heart disease Mother     Inflammatory bowel disease Mother     Colon cancer Mother     Colon polyps Mother     Liver cancer Mother     Ulcerative colitis Mother     Cerebral aneurysm Father     Crohn's disease Brother     Colon polyps  "Brother     Heart attack Brother 48    Heart attack Brother 40    Crohn's disease Cousin     Crohn's disease Cousin     Learning disabilities Neg Hx         Social History     Socioeconomic History    Marital status:    Tobacco Use    Smoking status: Former     Current packs/day: 0.00     Average packs/day: 1 pack/day for 20.0 years (20.0 ttl pk-yrs)     Types: Cigarettes     Start date:      Quit date:      Years since quittin.7    Smokeless tobacco: Never   Vaping Use    Vaping status: Never Used   Substance and Sexual Activity    Alcohol use: Yes     Alcohol/week: 4.0 standard drinks of alcohol     Types: 4 Glasses of wine per week     Comment: 4-6 PER WK    Drug use: No    Sexual activity: Yes     Partners: Male     Birth control/protection: None        OB History          2    Para   2    Term   2            AB        Living             SAB        IAB        Ectopic        Molar        Multiple        Live Births   2             Age at menarche-14  Age at first live childbirth-21   2 para 2  0  Underwent a hysterectomy and salpingo-oophorectomy at the age of 42 for abnormal Pap smears.  Hormone replacement therapy-15 years with estradiol patch    Allergies   Allergen Reactions    Amitiza [Lubiprostone] Other (See Comments)     Migraines    Codeine Hives and GI Intolerance    Pantoprazole Nausea And Vomiting          Review of systems as mentioned HPI otherwise negative    Objective   Blood pressure 131/76, pulse 75, temperature 98.2 °F (36.8 °C), temperature source Oral, resp. rate 16, height 162.6 cm (64.02\"), weight 53.8 kg (118 lb 8 oz), SpO2 99%, not currently breastfeeding.     Physical Exam  Vitals reviewed.   Constitutional:       Appearance: Normal appearance.   HENT:      Head: Normocephalic and atraumatic.      Right Ear: External ear normal.      Left Ear: External ear normal.      Nose: Nose normal.      Mouth/Throat:      Pharynx: Oropharynx is clear. "   Eyes:      Conjunctiva/sclera: Conjunctivae normal.   Cardiovascular:      Rate and Rhythm: Normal rate.   Pulmonary:      Effort: Pulmonary effort is normal.   Abdominal:      General: Abdomen is flat.   Musculoskeletal:         General: Normal range of motion.   Skin:     General: Skin is warm.   Neurological:      Mental Status: She is alert.   Psychiatric:         Mood and Affect: Mood normal.         Behavior: Behavior normal.         Thought Content: Thought content normal.         Judgment: Judgment normal.       Breast Exam: Right breast on inspection there is a scar in the periareolar region which is healing well.  Tender to palpation.  No new palpable abnormalities.  Left breast appears normal inspection.  No palpable abnormalities of the left breast      I have reexamined the patient and the results are consistent with the previously documented exam. OCTAVIO Ureña      No visits with results within 30 Day(s) from this visit.   Latest known visit with results is:   Office Visit on 01/29/2024   Component Date Value Ref Range Status    Diagnosis 01/29/2024 Comment   Final    Comment: NEGATIVE FOR INTRAEPITHELIAL LESION OR MALIGNANCY.  CELLULAR CHANGES ASSOCIATED WITH INFLAMMATION ARE PRESENT.      Specimen adequacy: 01/29/2024 Comment   Final    Satisfactory for evaluation.    Clinician Provided ICD-10: 01/29/2024 Comment   Final    Z12.4    Performed by: 01/29/2024 Comment   Final    Nikolay Grant, Cytotechnologist ([facility])    QC reviewed by: 01/29/2024 Comment   Final    Lidya Cartagena, Supervisory Cytotechnologist ([facility])    . 01/29/2024 .   Final    Note: 01/29/2024 Comment   Final    Comment: The Pap smear is a screening test designed to aid in the detection of  premalignant and malignant conditions of the uterine cervix.  It is not a  diagnostic procedure and should not be used as the sole means of detecting  cervical cancer.  Both false-positive and false-negative reports do occur.       Method: 01/29/2024 Comment   Final    Comment: This liquid based ThinPrep(R) pap test was screened with the  use of an image guided system.      Conv .conv 01/29/2024 Comment   Final    Comment: The HPV DNA reflex criteria were not met with this specimen result  therefore, no HPV testing was performed.          No radiology results for the last 30 days.       Assessment & Plan       *Focal atypical ductal hyperplasia  Discussed increased lifetime risk of breast cancer associated with ADH.  Given that the ADH is focal risk is likely on the low side.  20-year is likely around 24%.  She has discontinued the estradiol patch.  Started tamoxifen 5 mg in January 2024  10/4/2024: Continues tamoxifen 5 mg daily, tolerating well aside from hot flashes.    *Hormone replacement therapy  Patient has been on estradiol patch for almost 15 years.  This has been stopped.  Significant hot flashes    *Hot flashes-continue Effexor 75 mg daily.  Even on Effexor is having hot flashes multiple times a day, occasionally they are drenching.  She does feel this is tolerable, though does question other options to help with her hot flashes.  Discussed gabapentin, she has been on this previously and did not tolerate even a 100 mg dose of gabapentin.  We also discussed Veozah, however reviewed risk of elevated liver function studies and need for close lab monitoring with this, therefore she would like to hold off on Veozah and continue Effexor at this time.    *Risk reduction  Continue low-dose tamoxifen    *Surveillance  Continue annual mammograms and MRIs  Mammogram January 29, 2024-reviewed and benign  MRI 7/26/2024 benign  Screening mammogram scheduled 1/31/2025.    PLAN:   Continue Tamoxifen 5 mg daily, initiated January 2024.  Continue Effexor 75 mg daily.  Unable to tolerate gabapentin.  Discussed Veozah, however discussed risk of elevated liver function studies so patient would like to hold off on this.  Return in 6 months for MD follow  up.  Screening mammogram scheduled 1/31/2025.  MRI breast due July 2025.    Patient is on medications requiring close monitoring for toxicities.

## 2024-10-04 ENCOUNTER — OFFICE VISIT (OUTPATIENT)
Dept: ONCOLOGY | Facility: CLINIC | Age: 59
End: 2024-10-04
Payer: COMMERCIAL

## 2024-10-04 VITALS
DIASTOLIC BLOOD PRESSURE: 76 MMHG | TEMPERATURE: 98.2 F | OXYGEN SATURATION: 99 % | SYSTOLIC BLOOD PRESSURE: 131 MMHG | WEIGHT: 118.5 LBS | RESPIRATION RATE: 16 BRPM | HEIGHT: 64 IN | HEART RATE: 75 BPM | BODY MASS INDEX: 20.23 KG/M2

## 2024-10-04 DIAGNOSIS — N60.91 ATYPICAL DUCTAL HYPERPLASIA OF RIGHT BREAST: Primary | ICD-10-CM

## 2024-10-04 DIAGNOSIS — Z79.810 LONG-TERM CURRENT USE OF TAMOXIFEN: ICD-10-CM

## 2024-10-04 RX ORDER — TAMOXIFEN CITRATE 10 MG/1
5 TABLET ORAL DAILY
Qty: 45 TABLET | Refills: 3 | Status: SHIPPED | OUTPATIENT
Start: 2024-10-04

## 2024-10-15 NOTE — PROGRESS NOTES
Subjective   Chief Complaint   Patient presents with   • Urinary Frequency       History of Present Illness     Has had some low back pain for a few days. Has had dysuria with first urination in the AM     She has had frequency for the last 4-5 days. No blood in her urine. No fever, no nausea. She has had some pressure over her bladder. She only drinks 1 cup of coffee per day.      Patient Active Problem List   Diagnosis   • ASCUS with positive high risk HPV cervical       Allergies   Allergen Reactions   • Amitiza [Lubiprostone] Other (See Comments)     Migraines   • Codeine Hives   • Pantoprazole Nausea And Vomiting       Current Outpatient Medications on File Prior to Visit   Medication Sig Dispense Refill   • Calcium-Magnesium-Vitamin D (CALCIUM 500 PO) Take  by mouth 2 (Two) Times a Day.     • cholecalciferol (VITAMIN D3) 25 MCG (1000 UT) tablet Take 1,000 Units by mouth Daily.     • diphenhydrAMINE (BENADRYL) 25 mg Take 25 mg by mouth every 6 (six) hours as needed for itching.     • estradiol (VIVELLE-DOT) 0.025 MG/24HR patch PLACE 1 PATCH ONTO THE SKIN AS DIRECTED BY PROVIDER TWICE WEEKLY 8 patch 12   • lansoprazole (PREVACID) 30 MG capsule Take 1 capsule by mouth Daily. 90 capsule 3   • Linzess 145 MCG capsule capsule TAKE 1 CAPSULE BY MOUTH EVERY MORNING BEFORE BREAKFAST 90 capsule 3   • Omega-3 Fatty Acids (FISH OIL) 1000 MG capsule capsule Take  by mouth Daily With Breakfast.       No current facility-administered medications on file prior to visit.        Past Medical History:   Diagnosis Date   • Leiva esophagus    • Chest pain 2016   • Dyspepsia    • Gastric ulcer    • GERD (gastroesophageal reflux disease)    • Headache    • Irritable bowel syndrome    • Tubular adenoma of colon        Family History   Problem Relation Age of Onset   • Heart disease Mother    • Inflammatory bowel disease Mother    • Colon cancer Mother    • Colon polyps Mother    • Liver cancer Mother    • Ulcerative colitis Mother     • Cerebral aneurysm Father    • Crohn's disease Brother    • Colon polyps Brother    • Crohn's disease Cousin    • Crohn's disease Cousin        Social History     Socioeconomic History   • Marital status:      Spouse name: Not on file   • Number of children: Not on file   • Years of education: Not on file   • Highest education level: Not on file   Tobacco Use   • Smoking status: Former Smoker     Packs/day: 1.00     Years: 20.00     Pack years: 20.00     Start date: 2007     Quit date:      Years since quittin.0   • Smokeless tobacco: Never Used   Substance and Sexual Activity   • Alcohol use: Yes     Alcohol/week: 2.0 standard drinks     Types: 2 Glasses of wine per week     Comment: occ   • Drug use: No   • Sexual activity: Yes     Partners: Male     Birth control/protection: None       Past Surgical History:   Procedure Laterality Date   • CHOLECYSTECTOMY      Dr. EDER Alba   • COLONOSCOPY  2016    polyp, tics, tubular adenoma   • COLONOSCOPY  2019    normal ileum, diverticulosis, NBIH, TAx1   • ENDOSCOPY  2016    gastric ulcer w/clean base, acute gastritis. Leiva's esophagus   • ENDOSCOPY  2019    normal esophagus/duodenum, acute gastritis   • HYSTERECTOMY     • OOPHORECTOMY     • SHOULDER ARTHROSCOPY      Dr. Plunkett   • UPPER GASTROINTESTINAL ENDOSCOPY  2016         The following portions of the patient's history were reviewed and updated as appropriate: problem list, allergies, current medications, past medical history, past family history, past social history and past surgical history.    Review of Systems   Constitution: Negative for chills and fever.   Musculoskeletal: Positive for back pain.   Genitourinary: Positive for dysuria, frequency and urgency. Negative for hematuria.       Immunization History   Administered Date(s) Administered   • Flulaval/Fluarix/Fluzone Quad 10/14/2020   • Hepatitis A 2018   • Influenza Quad Vaccine (Inpatient)  "09/28/2019   • Tdap 01/23/2020       Objective   Vitals:    02/01/21 1406 02/01/21 1426   BP:  102/66   Temp: 97.6 °F (36.4 °C)    Weight: 54 kg (119 lb)    Height: 165.1 cm (65\")      Body mass index is 19.8 kg/m².  Physical Exam  Constitutional:       Appearance: Normal appearance.   HENT:      Head: Normocephalic and atraumatic.   Cardiovascular:      Rate and Rhythm: Normal rate and regular rhythm.      Heart sounds: Normal heart sounds.   Abdominal:      General: Abdomen is flat. Bowel sounds are normal.      Palpations: Abdomen is soft.      Tenderness: There is no right CVA tenderness or left CVA tenderness.   Neurological:      Mental Status: She is alert.       Assessment/Plan   Diagnoses and all orders for this visit:    1. Urinary frequency (Primary)    Urine dip is negative, continue to hydrate well. Will send out for UA with culture. Make take AZO prn for sx.              " Statement Selected

## 2024-10-16 DIAGNOSIS — Z00.00 ANNUAL PHYSICAL EXAM: ICD-10-CM

## 2024-10-21 LAB
ALBUMIN SERPL-MCNC: 4.4 G/DL (ref 3.5–5.2)
ALBUMIN/GLOB SERPL: 1.8 G/DL
ALP SERPL-CCNC: 69 U/L (ref 39–117)
ALT SERPL-CCNC: 11 U/L (ref 1–33)
AST SERPL-CCNC: 23 U/L (ref 1–32)
BILIRUB SERPL-MCNC: 0.7 MG/DL (ref 0–1.2)
BUN SERPL-MCNC: 12 MG/DL (ref 6–20)
BUN/CREAT SERPL: 19 (ref 7–25)
CALCIUM SERPL-MCNC: 9.7 MG/DL (ref 8.6–10.5)
CHLORIDE SERPL-SCNC: 103 MMOL/L (ref 98–107)
CHOLEST SERPL-MCNC: 185 MG/DL (ref 0–200)
CO2 SERPL-SCNC: 30.3 MMOL/L (ref 22–29)
CREAT SERPL-MCNC: 0.63 MG/DL (ref 0.57–1)
EGFRCR SERPLBLD CKD-EPI 2021: 103.6 ML/MIN/1.73
GLOBULIN SER CALC-MCNC: 2.4 GM/DL
GLUCOSE SERPL-MCNC: 90 MG/DL (ref 65–99)
HDLC SERPL-MCNC: 66 MG/DL (ref 40–60)
LDLC SERPL CALC-MCNC: 109 MG/DL (ref 0–100)
POTASSIUM SERPL-SCNC: 4.9 MMOL/L (ref 3.5–5.2)
PROT SERPL-MCNC: 6.8 G/DL (ref 6–8.5)
SODIUM SERPL-SCNC: 143 MMOL/L (ref 136–145)
TRIGL SERPL-MCNC: 53 MG/DL (ref 0–150)
VLDLC SERPL CALC-MCNC: 10 MG/DL (ref 5–40)

## 2024-10-24 NOTE — PROGRESS NOTES
Subjective   Chief Complaint   Patient presents with    Annual Exam       History of Present Illness   58 y.o. female presents for annual physical. Feeling good but her allergies are bothering her for the last 6 weeks. Using Flonase for the last 4 weeks. Using Mucinex sinus and Laila Pot without relief; also using Jamia-seltzer without relief. Headaches much better with Aimovig reporting 60% improvement. Continues with Dr. Nieto annually. Denies chest pain or dyspnea. Denies dietary changes. Weigh stable. Continues daily exercise walking on treadmill 5-6 miles daily. No melena or hematochezia. No improvement with hot flashes despite Effexor XR 75 mg daily; multiple hot flashes daily soaking sheets and clothes.      Patient Active Problem List   Diagnosis    Cerebral aneurysm without rupture    History of migraine    Irritable bowel syndrome with constipation    Dry eyes    Atypical ductal hyperplasia of right breast       Allergies   Allergen Reactions    Amitiza [Lubiprostone] Other (See Comments)     Migraines    Codeine Hives and GI Intolerance    Pantoprazole Nausea And Vomiting       Current Outpatient Medications on File Prior to Visit   Medication Sig Dispense Refill    acetaminophen (TYLENOL) 325 MG tablet Take 2 tablets by mouth Every 6 (Six) Hours As Needed for Mild Pain.      Aimovig 140 MG/ML auto-injector Inject 1 mL under the skin into the appropriate area as directed Every 30 (Thirty) Days. 1 mL 2    Calcium-Magnesium-Vitamin D (CALCIUM 500 PO) Take 1 tablet by mouth Daily.      cholecalciferol (VITAMIN D3) 25 MCG (1000 UT) tablet Take 1 tablet by mouth Daily.      fluticasone (FLONASE) 50 MCG/ACT nasal spray Daily.      linaclotide (Linzess) 145 MCG capsule capsule Take 1 capsule by mouth Every Morning Before Breakfast. 90 capsule 3    Restasis 0.05 % ophthalmic emulsion Administer 1 drop to both eyes 2 (Two) Times a Day.      tamoxifen (NOLVADEX) 10 MG tablet Take 0.5 tablets by mouth Daily. 45  tablet 3    [DISCONTINUED] diphenhydrAMINE (BENADRYL) 25 mg Take 1 capsule by mouth Every 6 (Six) Hours As Needed for Itching or Allergies.      [DISCONTINUED] venlafaxine XR (EFFEXOR-XR) 75 MG 24 hr capsule TAKE 1 CAPSULE BY MOUTH DAILY 30 capsule 3    [DISCONTINUED] methylPREDNISolone (MEDROL) 4 MG dose pack Take as directed on package instructions. (Patient not taking: Reported on 10/28/2024) 21 tablet 0     No current facility-administered medications on file prior to visit.       Past Medical History:   Diagnosis Date    Aneurysm     OPTICAL-TODNAM    Leiva esophagus     Chest pain     Gastric ulcer     GERD (gastroesophageal reflux disease)     History of migraine     Pyelonephritis 10/31/2022    Tubular adenoma of colon        Family History   Problem Relation Age of Onset    Heart disease Mother     Inflammatory bowel disease Mother     Colon cancer Mother     Colon polyps Mother     Liver cancer Mother     Ulcerative colitis Mother     Cerebral aneurysm Father     Crohn's disease Brother     Colon polyps Brother     Heart attack Brother 48    Heart attack Brother 40    Crohn's disease Cousin     Crohn's disease Cousin     Learning disabilities Neg Hx        Social History     Socioeconomic History    Marital status:    Tobacco Use    Smoking status: Former     Current packs/day: 0.00     Average packs/day: 1 pack/day for 20.0 years (20.0 ttl pk-yrs)     Types: Cigarettes     Start date:      Quit date: 2016     Years since quittin.8    Smokeless tobacco: Never   Vaping Use    Vaping status: Never Used   Substance and Sexual Activity    Alcohol use: Yes     Alcohol/week: 4.0 standard drinks of alcohol     Types: 4 Glasses of wine per week     Comment: 4-6 PER WK    Drug use: No    Sexual activity: Yes     Partners: Male     Birth control/protection: None       Past Surgical History:   Procedure Laterality Date    BREAST BIOPSY      BREAST LUMPECTOMY Right 8/10/2023    Procedure: RIGHT  "breast ANALI guided excision atypical hyperplasia;  Surgeon: Alissa Head MD;  Location: [facility];  Service: General;  Laterality: Right;    CHOLECYSTECTOMY      Dr. EDER Alba    COLONOSCOPY  05/18/2016    polyp, tics, tubular adenoma    COLONOSCOPY  06/05/2019    normal ileum, diverticulosis, NBIH, TAx1    ENDOSCOPY  05/18/2016    gastric ulcer w/clean base, acute gastritis. Leiva's esophagus    ENDOSCOPY  06/05/2019    normal esophagus/duodenum, acute gastritis    HYSTERECTOMY      OOPHORECTOMY      SHOULDER ARTHROSCOPY Left     Dr. Plunkett    UPPER GASTROINTESTINAL ENDOSCOPY  05/18/2016       The following portions of the patient's history were reviewed and updated as appropriate: problem list, allergies, current medications, past medical history, past family history, past social history, and past surgical history.    Review of Systems    Immunization History   Administered Date(s) Administered    COVID-19 (PFIZER) Purple Cap Monovalent 03/27/2021, 04/19/2021, 12/07/2021    Flu Vaccine Intradermal Quad 18-64YR 04/21/2022    Flu Vaccine Quad PF >36MO 10/14/2020    Flublok 18+yrs 04/21/2022    Fluzone  >6mos 09/28/2019    Fluzone (or Fluarix & Flulaval for VFC) >6mos 10/14/2020, 10/14/2021, 10/27/2022    Hepatitis A 05/05/2018    Influenza Injectable Mdck Pf Quad 10/27/2022    Pneumococcal Polysaccharide (PPSV23) 02/21/2022    Tdap 01/23/2020       Objective   Vitals:    10/28/24 0740   BP: 100/72   Pulse: 68   Resp: 12   Temp: 98.1 °F (36.7 °C)   Weight: 53.5 kg (118 lb)   Height: 162.6 cm (64.02\")     Body mass index is 20.24 kg/m².  Physical Exam  Vitals reviewed.   Constitutional:       Appearance: Normal appearance. She is well-developed and normal weight.   HENT:      Head: Normocephalic and atraumatic.      Right Ear: Tympanic membrane, ear canal and external ear normal.      Left Ear: Tympanic membrane and external ear normal.      Ears:      Comments: EAC with hard cerumen occluding canal by " 25%; able to view TM.      Nose: Nose normal.      Mouth/Throat:      Mouth: Mucous membranes are moist.      Pharynx: Oropharynx is clear. No oropharyngeal exudate or posterior oropharyngeal erythema.      Comments: +Cobblestoning and PND.   Eyes:      General: No scleral icterus.     Extraocular Movements: Extraocular movements intact.      Conjunctiva/sclera: Conjunctivae normal.      Pupils: Pupils are equal, round, and reactive to light.   Neck:      Thyroid: No thyromegaly.   Cardiovascular:      Rate and Rhythm: Normal rate and regular rhythm.      Heart sounds: Normal heart sounds. No murmur heard.  Pulmonary:      Effort: Pulmonary effort is normal.      Breath sounds: Normal breath sounds.   Abdominal:      General: Bowel sounds are normal. There is no distension.      Palpations: Abdomen is soft.      Tenderness: There is no abdominal tenderness.   Musculoskeletal:      Cervical back: Neck supple.      Comments: Ambulates without assistance; no clubbing, cyanosis or edema.    Lymphadenopathy:      Cervical: No cervical adenopathy.   Skin:     General: Skin is warm and dry.   Neurological:      Mental Status: She is alert.   Psychiatric:         Mood and Affect: Mood normal.         Behavior: Behavior normal.         Procedures    Assessment & Plan   Diagnoses and all orders for this visit:    1. Annual physical exam (Primary)  Comments:  150 minutes weekly aerobic exercise advised. Declines COVID or flu shot. CLS UTD. MMG with Dr. Guillen.    2. Hot flashes due to menopause  Comments:  Increase EffexorXR to 150 mg daily and RTO 8W.  Orders:  -     venlafaxine XR (Effexor XR) 150 MG 24 hr capsule; Take 1 capsule by mouth Daily.  Dispense: 90 capsule; Refill: 1    3. Screening for lung cancer  Comments:  Quit 2014 with 20 pk-yr history.  Orders:  -      CT Chest Low Dose Cancer Screening WO    4. History of migraine  Comments:  60% improvement with Aimovig.    5. Cerebral aneurysm without  rupture  Comments:  Followed by Dr. Nieto annually.    Records reviewed include previous OV with myself and Dr. Guillen as well as labs.     Return in about 8 weeks (around 12/23/2024) for 30 Hot flashes.

## 2024-10-28 ENCOUNTER — OFFICE VISIT (OUTPATIENT)
Dept: INTERNAL MEDICINE | Facility: CLINIC | Age: 59
End: 2024-10-28
Payer: COMMERCIAL

## 2024-10-28 VITALS
HEIGHT: 64 IN | RESPIRATION RATE: 12 BRPM | SYSTOLIC BLOOD PRESSURE: 100 MMHG | DIASTOLIC BLOOD PRESSURE: 72 MMHG | WEIGHT: 118 LBS | BODY MASS INDEX: 20.14 KG/M2 | TEMPERATURE: 98.1 F | HEART RATE: 68 BPM

## 2024-10-28 DIAGNOSIS — Z00.00 ANNUAL PHYSICAL EXAM: Primary | ICD-10-CM

## 2024-10-28 DIAGNOSIS — I67.1 CEREBRAL ANEURYSM WITHOUT RUPTURE: Chronic | ICD-10-CM

## 2024-10-28 DIAGNOSIS — Z12.2 SCREENING FOR LUNG CANCER: ICD-10-CM

## 2024-10-28 DIAGNOSIS — Z86.69 HISTORY OF MIGRAINE: Chronic | ICD-10-CM

## 2024-10-28 DIAGNOSIS — N95.1 HOT FLASHES DUE TO MENOPAUSE: Chronic | ICD-10-CM

## 2024-10-28 PROCEDURE — 99396 PREV VISIT EST AGE 40-64: CPT | Performed by: NURSE PRACTITIONER

## 2024-10-28 RX ORDER — VENLAFAXINE HYDROCHLORIDE 150 MG/1
150 CAPSULE, EXTENDED RELEASE ORAL DAILY
Qty: 90 CAPSULE | Refills: 1 | Status: SHIPPED | OUTPATIENT
Start: 2024-10-28

## 2024-10-31 DIAGNOSIS — J01.90 ACUTE NON-RECURRENT SINUSITIS, UNSPECIFIED LOCATION: Primary | ICD-10-CM

## 2024-10-31 RX ORDER — CEFDINIR 300 MG/1
300 CAPSULE ORAL 2 TIMES DAILY
Qty: 14 CAPSULE | Refills: 0 | Status: SHIPPED | OUTPATIENT
Start: 2024-10-31

## 2024-11-18 ENCOUNTER — TELEPHONE (OUTPATIENT)
Dept: GASTROENTEROLOGY | Facility: CLINIC | Age: 59
End: 2024-11-18
Payer: COMMERCIAL

## 2024-11-18 NOTE — TELEPHONE ENCOUNTER
I tried to call the Pharmacy, patient should have refills for her Linzess, which should not need a PA, I called her original pharmacy and she has transferred her medications [facility], [facility] had to [facility]. Will try again tomorrow.

## 2024-11-22 ENCOUNTER — TELEPHONE (OUTPATIENT)
Dept: GASTROENTEROLOGY | Facility: CLINIC | Age: 59
End: 2024-11-22
Payer: COMMERCIAL

## 2024-11-22 NOTE — TELEPHONE ENCOUNTER
Called patient back to let her know that [facility] said it was fine to give her some more samples. I told her they would be at the . [facility] she said she will get them on Monday.

## 2024-11-22 NOTE — TELEPHONE ENCOUNTER
Routed to Namita to follow up on the PA she submitted for this patient and to contact patient with an update

## 2024-11-22 NOTE — TELEPHONE ENCOUNTER
Hub staff attempted to follow warm transfer process and was unsuccessful     Caller: Iliana Reyes    Relationship to patient: Self    Best call back number: [phone]    Patient is needing: PATIENT CALLED IN FOR AN UPDATE ON THE PRIOR AUTHORIZATION FOR HER LINZESS MEDICATION. PATIENT STATED THAT HER INSURANCE COMPANY HAS ALSO BEEN TRYING TO REACH OUT AND HAVE NOT GOTTEN A RESPONSE. PATIENT STATES THAT SHE IS COMPLETELY OUT OF THE MEDICATION AND HAS BEEN FOR A WHILE. PLEASE CALL BACK ANYTIME TO UPDATE, OKAY TO LEAVE VM.

## 2024-11-22 NOTE — TELEPHONE ENCOUNTER
Called patient and let her know I sent the PA on the 22 and it says it can take 1 to 5 business days. She said she will call her insurance and see what the hold up is. I told her I will talk to Leesa about getting some samples in the mean time. PVU

## 2024-11-25 ENCOUNTER — TELEPHONE (OUTPATIENT)
Dept: GASTROENTEROLOGY | Facility: CLINIC | Age: 59
End: 2024-11-25
Payer: COMMERCIAL

## 2024-11-25 NOTE — TELEPHONE ENCOUNTER
Called patient to let her know her PA was approved for Linzess 145 mcg patient said her pharmacy is still not approving it.  She is going to call them back now.

## 2024-12-17 NOTE — PROGRESS NOTES
Subjective   Chief Complaint   Patient presents with    Hot Flashes     8 weeks fup//states a lot better        History of Present Illness   59 y.o. female presents for follow up after increasing Effexor XR to 150 mg daily for hot flashes. Notes 99% improvement. No longer having night sweats and doing well during the day.      Patient Active Problem List   Diagnosis    Cerebral aneurysm without rupture    History of migraine    Irritable bowel syndrome with constipation    Dry eyes    Atypical ductal hyperplasia of right breast       Allergies   Allergen Reactions    Amitiza [Lubiprostone] Other (See Comments)     Migraines    Codeine Hives and GI Intolerance    Pantoprazole Nausea And Vomiting       Current Outpatient Medications on File Prior to Visit   Medication Sig Dispense Refill    acetaminophen (TYLENOL) 325 MG tablet Take 2 tablets by mouth Every 6 (Six) Hours As Needed for Mild Pain.      Aimovig 140 MG/ML auto-injector Inject 1 mL under the skin into the appropriate area as directed Every 30 (Thirty) Days. 1 mL 2    Calcium-Magnesium-Vitamin D (CALCIUM 500 PO) Take 1 tablet by mouth Daily.      cholecalciferol (VITAMIN D3) 25 MCG (1000 UT) tablet Take 1 tablet by mouth Daily.      fluticasone (FLONASE) 50 MCG/ACT nasal spray Daily.      linaclotide (Linzess) 145 MCG capsule capsule Take 1 capsule by mouth Every Morning Before Breakfast. 90 capsule 3    Restasis 0.05 % ophthalmic emulsion Administer 1 drop to both eyes 2 (Two) Times a Day.      tamoxifen (NOLVADEX) 10 MG tablet Take 0.5 tablets by mouth Daily. 45 tablet 3    [DISCONTINUED] venlafaxine XR (Effexor XR) 150 MG 24 hr capsule Take 1 capsule by mouth Daily. 90 capsule 1    [DISCONTINUED] cefdinir (OMNICEF) 300 MG capsule Take 1 capsule by mouth 2 (Two) Times a Day. (Patient not taking: Reported on 12/20/2024) 14 capsule 0     No current facility-administered medications on file prior to visit.       Past Medical History:   Diagnosis Date     Aneurysm     OPTICAL-TODNAM    Leiva esophagus     Chest pain     Gastric ulcer     GERD (gastroesophageal reflux disease)     History of migraine     Pyelonephritis 10/31/2022    Tubular adenoma of colon        Family History   Problem Relation Age of Onset    Heart disease Mother     Inflammatory bowel disease Mother     Colon cancer Mother     Colon polyps Mother     Liver cancer Mother     Ulcerative colitis Mother     Cerebral aneurysm Father     Crohn's disease Brother     Colon polyps Brother     Heart attack Brother 48    Heart attack Brother 40    Crohn's disease Cousin     Crohn's disease Cousin     Learning disabilities Neg Hx        Social History     Socioeconomic History    Marital status:    Tobacco Use    Smoking status: Former     Current packs/day: 0.00     Average packs/day: 1 pack/day for 20.0 years (20.0 ttl pk-yrs)     Types: Cigarettes     Start date:      Quit date:      Years since quittin.9    Smokeless tobacco: Never   Vaping Use    Vaping status: Never Used   Substance and Sexual Activity    Alcohol use: Yes     Alcohol/week: 4.0 standard drinks of alcohol     Types: 4 Glasses of wine per week     Comment: 4-6 PER WK    Drug use: No    Sexual activity: Yes     Partners: Male     Birth control/protection: None       Past Surgical History:   Procedure Laterality Date    BREAST BIOPSY      BREAST LUMPECTOMY Right 8/10/2023    Procedure: RIGHT breast ANALI guided excision atypical hyperplasia;  Surgeon: Alissa Head MD;  Location: [facility];  Service: General;  Laterality: Right;    CHOLECYSTECTOMY      Dr. EDER Alba    COLONOSCOPY  2016    polyp, tics, tubular adenoma    COLONOSCOPY  2019    normal ileum, diverticulosis, NBIH, TAx1    ENDOSCOPY  2016    gastric ulcer w/clean base, acute gastritis. Leiva's esophagus    ENDOSCOPY  2019    normal esophagus/duodenum, acute gastritis    HYSTERECTOMY      OOPHORECTOMY      SHOULDER  "ARTHROSCOPY Left     Dr. Plunkett    UPPER GASTROINTESTINAL ENDOSCOPY  05/18/2016       The following portions of the patient's history were reviewed and updated as appropriate: problem list, allergies, current medications, past medical history, and past social history.    Review of Systems    Immunization History   Administered Date(s) Administered    COVID-19 (PFIZER) Purple Cap Monovalent 03/27/2021, 04/19/2021, 12/07/2021    Flu Vaccine Intradermal Quad 18-64YR 04/21/2022    Flu Vaccine Quad PF >36MO 10/14/2020    Flublok 18+yrs 04/21/2022    Fluzone  >6mos 09/28/2019    Fluzone (or Fluarix & Flulaval for VFC) >6mos 10/14/2020, 10/14/2021, 10/27/2022    Hepatitis A 05/05/2018    Influenza Injectable Mdck Pf Quad 10/27/2022    Pneumococcal Polysaccharide (PPSV23) 02/21/2022    Tdap 01/23/2020       Objective   Vitals:    12/20/24 0914   Temp: 98.1 °F (36.7 °C)   Weight: 56.2 kg (124 lb)   Height: 162.6 cm (64.02\")     Body mass index is 21.27 kg/m².  Physical Exam  Constitutional:       Appearance: Normal appearance. She is normal weight.   HENT:      Head: Normocephalic and atraumatic.   Pulmonary:      Effort: Pulmonary effort is normal.   Neurological:      Mental Status: She is alert.   Psychiatric:         Mood and Affect: Mood normal.         Behavior: Behavior normal.         Procedures    Assessment & Plan   Diagnoses and all orders for this visit:    1. Hot flashes due to menopause (Primary)  Comments:  99% improved with Effexor  mg daily. Continue and RTO for phsyical in October 2025.  Orders:  -     venlafaxine XR (Effexor XR) 150 MG 24 hr capsule; Take 1 capsule by mouth Daily.  Dispense: 90 capsule; Refill: 3    2. Screening for metabolic disorder  -     Comprehensive Metabolic Panel; Future  -     Lipid Panel With LDL / HDL Ratio; Future      Records reviewed include previous OV with myself as well as labs.     Return in about 45 weeks (around 10/31/2025) for Annual physical, Lab B4 FUP.         "

## 2024-12-20 ENCOUNTER — OFFICE VISIT (OUTPATIENT)
Dept: INTERNAL MEDICINE | Facility: CLINIC | Age: 59
End: 2024-12-20
Payer: COMMERCIAL

## 2024-12-20 VITALS — BODY MASS INDEX: 21.17 KG/M2 | WEIGHT: 124 LBS | HEIGHT: 64 IN | TEMPERATURE: 98.1 F

## 2024-12-20 DIAGNOSIS — Z13.228 SCREENING FOR METABOLIC DISORDER: ICD-10-CM

## 2024-12-20 DIAGNOSIS — N95.1 HOT FLASHES DUE TO MENOPAUSE: Primary | Chronic | ICD-10-CM

## 2024-12-20 PROCEDURE — 99213 OFFICE O/P EST LOW 20 MIN: CPT | Performed by: NURSE PRACTITIONER

## 2024-12-20 RX ORDER — VENLAFAXINE HYDROCHLORIDE 150 MG/1
150 CAPSULE, EXTENDED RELEASE ORAL DAILY
Qty: 90 CAPSULE | Refills: 3 | Status: SHIPPED | OUTPATIENT
Start: 2024-12-20

## 2025-01-13 DIAGNOSIS — I67.1 CEREBRAL ANEURYSM WITHOUT RUPTURE: Primary | ICD-10-CM

## 2025-01-17 ENCOUNTER — TELEPHONE (OUTPATIENT)
Dept: INTERNAL MEDICINE | Facility: CLINIC | Age: 60
End: 2025-01-17
Payer: COMMERCIAL

## 2025-01-17 DIAGNOSIS — Z86.69 HISTORY OF MIGRAINE: Primary | ICD-10-CM

## 2025-01-17 NOTE — TELEPHONE ENCOUNTER
Caller: [facility] [id number] - [place], [place] - [address] [place] [address] [address] - [phone] PH - [phone] FX    Relationship: Pharmacy    Best call back number: [phone]     What was the call regarding: REGARDING: EMGALITY. -- ARE THEY DOING 2 SYRINGE LOADING DOSE? PLEASE CALL AND ADVISE

## 2025-01-31 ENCOUNTER — HOSPITAL ENCOUNTER (OUTPATIENT)
Dept: MAMMOGRAPHY | Facility: HOSPITAL | Age: 60
Discharge: HOME | End: 2025-01-31
Admitting: INTERNAL MEDICINE
Payer: COMMERCIAL

## 2025-01-31 DIAGNOSIS — N60.91 ATYPICAL DUCTAL HYPERPLASIA OF RIGHT BREAST: ICD-10-CM

## 2025-01-31 PROCEDURE — 77063 BREAST TOMOSYNTHESIS BI: CPT

## 2025-01-31 PROCEDURE — 77067 SCR MAMMO BI INCL CAD: CPT

## 2025-02-03 NOTE — PROGRESS NOTES
GYN ANNUAL EXAM   Chief Complaint   Patient presents with    Annual Exam     AE and last pap ,MX        HPI    Iliana is a 59 y.o. female who presents for annual well woman exam. She is a patient of Dr. Carvajal.     OB History          2    Para   2    Term   2            AB        Living             SAB        IAB        Ectopic        Molar        Multiple        Live Births   2                SUBJECTIVE    MENSTRUAL & SEXUAL HEALTH  LMP: No LMP recorded (lmp unknown). Patient has had a hysterectomy.  Menses regularity: status post hysterectomy  Menses length:  status post hysterectomy  Dysmenorrhea: none  Cyclic symptoms: none  Current contraception: status post hysterectomy  Last pap: , Normal PAP  History of abnormal pap: no  History of STD: No  Family history of cancer: colon cancer       Family history of breast cancer: no, but patient does have a personal history of atypical ductal hyperplasia that is being monitored  Performs SBE: performs monthly.  Mammogram: , Birads I (Normal).  History of abnormal mammogram:  yes  Colonoscopy:   Bleeding since LMP: no  Vasomotor symptoms: yes  HRT: no  Incontinence: Patient reports that she is not currently experiencing any symptoms of urinary incontinence.   Dyspareunia: no    Past Medical History:   Diagnosis Date    Aneurysm     OPTICAL-TODNAM    Leiva esophagus     Chest pain     Gastric ulcer     GERD (gastroesophageal reflux disease)     History of migraine     Pyelonephritis 10/31/2022    Tubular adenoma of colon        Past Surgical History:   Procedure Laterality Date    BREAST BIOPSY      BREAST LUMPECTOMY Right 8/10/2023    Procedure: RIGHT breast ANALI guided excision atypical hyperplasia;  Surgeon: Alissa Head MD;  Location: [facility];  Service: General;  Laterality: Right;    CHOLECYSTECTOMY      Dr. EDER Alba    COLONOSCOPY  2016    polyp, tics, tubular adenoma    COLONOSCOPY  2019    normal  ileum, diverticulosis, NBIH, TAx1    ENDOSCOPY  2016    gastric ulcer w/clean base, acute gastritis. Leiva's esophagus    ENDOSCOPY  2019    normal esophagus/duodenum, acute gastritis    HYSTERECTOMY      OOPHORECTOMY      SHOULDER ARTHROSCOPY Left     Dr. Plunkett    UPPER GASTROINTESTINAL ENDOSCOPY  2016         Current Outpatient Medications:     acetaminophen (TYLENOL) 325 MG tablet, Take 2 tablets by mouth Every 6 (Six) Hours As Needed for Mild Pain., Disp: , Rfl:     Calcium-Magnesium-Vitamin D (CALCIUM 500 PO), Take 1 tablet by mouth Daily., Disp: , Rfl:     cholecalciferol (VITAMIN D3) 25 MCG (1000 UT) tablet, Take 1 tablet by mouth Daily., Disp: , Rfl:     fluticasone (FLONASE) 50 MCG/ACT nasal spray, Daily., Disp: , Rfl:     linaclotide (Linzess) 145 MCG capsule capsule, Take 1 capsule by mouth Every Morning Before Breakfast., Disp: 90 capsule, Rfl: 3    Restasis 0.05 % ophthalmic emulsion, Administer 1 drop to both eyes 2 (Two) Times a Day., Disp: , Rfl:     tamoxifen (NOLVADEX) 10 MG tablet, Take 0.5 tablets by mouth Daily., Disp: 45 tablet, Rfl: 3    venlafaxine XR (Effexor XR) 150 MG 24 hr capsule, Take 1 capsule by mouth Daily., Disp: 90 capsule, Rfl: 3    Allergies   Allergen Reactions    Amitiza [Lubiprostone] Other (See Comments)     Migraines    Codeine Hives and GI Intolerance    Pantoprazole Nausea And Vomiting       Social History     Tobacco Use    Smoking status: Former     Current packs/day: 0.00     Average packs/day: 1 pack/day for 20.0 years (20.0 ttl pk-yrs)     Types: Cigarettes     Start date:      Quit date: 2016     Years since quittin.1    Smokeless tobacco: Never   Vaping Use    Vaping status: Never Used   Substance Use Topics    Alcohol use: Yes     Alcohol/week: 4.0 standard drinks of alcohol     Types: 4 Glasses of wine per week     Comment: 4-6 PER WK    Drug use: No       Family History   Problem Relation Age of Onset    Heart disease Mother      "Inflammatory bowel disease Mother     Colon cancer Mother     Colon polyps Mother     Liver cancer Mother     Ulcerative colitis Mother     Cerebral aneurysm Father     Crohn's disease Brother     Colon polyps Brother     Heart attack Brother 48    Heart attack Brother 40    Crohn's disease Cousin     Crohn's disease Cousin     Learning disabilities Neg Hx        Review of Systems   Constitutional:  Negative for fatigue, unexpected weight gain and unexpected weight loss.   Gastrointestinal:  Negative for abdominal pain.   Genitourinary:  Negative for decreased libido, difficulty urinating, dyspareunia, dysuria, pelvic pain, pelvic pressure, urgency, urinary incontinence and vaginal discharge.   All other systems reviewed and are negative.      OBJECTIVE    /83   Ht 162.6 cm (64.02\")   Wt 55.8 kg (123 lb)   LMP  (LMP Unknown) Comment: complete  BMI 21.10 kg/m²     Physical Exam  Constitutional:       General: She is awake. She is not in acute distress.     Appearance: Normal appearance. She is well-developed, well-groomed and normal weight. She is not ill-appearing.   Genitourinary:      Vulva, bladder and urethral meatus normal.      No lesions in the vagina.      Right Labia: No rash, tenderness, lesions or skin changes.     Left Labia: No tenderness, lesions, skin changes or rash.     No labial fusion noted.      No inguinal adenopathy present in the right or left side.     Vaginal cuff intact.     No vaginal discharge, erythema, tenderness, bleeding, ulceration, granulation tissue or cuff induration.      No vaginal prolapse present.     No vaginal atrophy present.     Cervix is absent.      Uterus is absent.      No urethral prolapse or discharge present.      Pelvic exam was performed with patient in the lithotomy position.   Breasts:     Breasts are symmetrical.      Breasts are soft.     Right: Normal. No inverted nipple, mass, nipple discharge, skin change or tenderness.      Left: Normal. No " inverted nipple, mass, nipple discharge, skin change or tenderness.   HENT:      Head: Normocephalic and atraumatic.   Eyes:      General: No scleral icterus.     Conjunctiva/sclera: Conjunctivae normal.   Cardiovascular:      Rate and Rhythm: Normal rate and regular rhythm.      Heart sounds: Normal heart sounds.   Pulmonary:      Effort: Pulmonary effort is normal.      Breath sounds: Normal breath sounds.   Abdominal:      Palpations: Abdomen is soft.      Tenderness: There is no abdominal tenderness. There is no guarding or rebound.   Musculoskeletal:         General: Normal range of motion.      Cervical back: Normal range of motion.   Lymphadenopathy:      Upper Body:      Right upper body: No axillary adenopathy.      Left upper body: No axillary adenopathy.      Lower Body: No right inguinal adenopathy. No left inguinal adenopathy.   Neurological:      Mental Status: She is alert and oriented to person, place, and time.   Skin:     General: Skin is warm and dry.   Psychiatric:         Behavior: Behavior normal. Behavior is cooperative.   Vitals reviewed.         ASSESSMENT    Diagnoses and all orders for this visit:    1. Encounter for gynecological examination without abnormal finding (Primary)  -     IGP, Apt HPV,rfx 16 / 18,45         PLAN   WELL WOMAN EXAM: Pap smear collected today. Recommend MVI daily.    SMOKING STATUS: former smoker.  BONE HEALTH: weight bearing exercise, dietary calcium recommendations and vitamin D reviewed.  COLON HEALTH: screening colonoscopy or Cologuard recommended.  BREAST HEALTH: Encouraged annual mammogram screening starting at age 40. Instructed on how to perform SBE. Encouraged breast health self awareness.  EXERCISE: Encouraged 150 minutes of exercise per week if not medially contraindicated.   BMI: Body mass index is 21.1 kg/m².     Return in about 1 year (around 2/4/2026) for annual exam or as needed before.    I spent 30 minutes caring for Iliana on this date of  service. This time includes time spent by me in the following activities: preparing for the visit, reviewing tests, performing a medically appropriate examination and/or evaluation, counseling and educating the patient/family/caregiver, referring and communicating with other health care professionals, documenting information in the medical record, independently interpreting results and communicating that information with the patient/family/caregiver, care coordination, ordering medications, ordering test(s), ordering procedure(s), obtaining a separately obtained history, and reviewing a separately obtained history.    Birgit Toure CNM  2/9/2025  17:34 EST

## 2025-02-04 ENCOUNTER — OFFICE VISIT (OUTPATIENT)
Dept: OBSTETRICS AND GYNECOLOGY | Facility: CLINIC | Age: 60
End: 2025-02-04
Payer: COMMERCIAL

## 2025-02-04 VITALS
DIASTOLIC BLOOD PRESSURE: 83 MMHG | HEIGHT: 64 IN | WEIGHT: 123 LBS | SYSTOLIC BLOOD PRESSURE: 135 MMHG | BODY MASS INDEX: 21 KG/M2

## 2025-02-04 DIAGNOSIS — Z01.419 ENCOUNTER FOR GYNECOLOGICAL EXAMINATION WITHOUT ABNORMAL FINDING: Primary | ICD-10-CM

## 2025-02-08 LAB
CYTOLOGIST CVX/VAG CYTO: NORMAL
CYTOLOGY CVX/VAG DOC CYTO: NORMAL
CYTOLOGY CVX/VAG DOC THIN PREP: NORMAL
DX ICD CODE: NORMAL
HPV I/H RISK 4 DNA CVX QL PROBE+SIG AMP: NEGATIVE
OTHER STN SPEC: NORMAL
SERVICE CMNT-IMP: NORMAL
STAT OF ADQ CVX/VAG CYTO-IMP: NORMAL

## 2025-02-19 DIAGNOSIS — Z86.69 HISTORY OF MIGRAINE: ICD-10-CM

## 2025-02-20 RX ORDER — GALCANEZUMAB 120 MG/ML
INJECTION, SOLUTION SUBCUTANEOUS
Qty: 4 ML | Refills: 11 | Status: SHIPPED | OUTPATIENT
Start: 2025-02-20

## 2025-03-10 ENCOUNTER — HOSPITAL ENCOUNTER (OUTPATIENT)
Facility: HOSPITAL | Age: 60
Discharge: HOME | End: 2025-03-10
Admitting: NEUROLOGICAL SURGERY
Payer: COMMERCIAL

## 2025-03-10 DIAGNOSIS — I67.1 CEREBRAL ANEURYSM WITHOUT RUPTURE: ICD-10-CM

## 2025-03-10 PROCEDURE — 70544 MR ANGIOGRAPHY HEAD W/O DYE: CPT

## 2025-03-12 NOTE — PROGRESS NOTES
Subjective   History of Present Illness: Iliana Reyes is a 59 y.o. female is here today for follow-up for nonruptured left ophthalmic aneurysm.  She has been doing well since last office visit and denies any headache or strokelike symptoms.        The following portions of the patient's history were reviewed and updated as appropriate: allergies, current medications, past family history, past medical history, past social history, past surgical history, and problem list.      Past Medical History:   Diagnosis Date    Aneurysm     OPTICAL-TODNAM    Leiva esophagus     Chest pain 2016    Gastric ulcer     GERD (gastroesophageal reflux disease)     History of migraine     Pyelonephritis 10/31/2022    Tubular adenoma of colon         Past Surgical History:   Procedure Laterality Date    BREAST BIOPSY      BREAST LUMPECTOMY Right 8/10/2023    Procedure: RIGHT breast ANALI guided excision atypical hyperplasia;  Surgeon: Alissa Head MD;  Location: [facility];  Service: General;  Laterality: Right;    CHOLECYSTECTOMY      Dr. EDER Alba    COLONOSCOPY  05/18/2016    polyp, tics, tubular adenoma    COLONOSCOPY  06/05/2019    normal ileum, diverticulosis, NBIH, TAx1    ENDOSCOPY  05/18/2016    gastric ulcer w/clean base, acute gastritis. Leiva's esophagus    ENDOSCOPY  06/05/2019    normal esophagus/duodenum, acute gastritis    HYSTERECTOMY      OOPHORECTOMY      SHOULDER ARTHROSCOPY Left     Dr. Plunkett    UPPER GASTROINTESTINAL ENDOSCOPY  05/18/2016          Current Outpatient Medications:     acetaminophen (TYLENOL) 325 MG tablet, Take 2 tablets by mouth Every 6 (Six) Hours As Needed for Mild Pain., Disp: , Rfl:     Calcium-Magnesium-Vitamin D (CALCIUM 500 PO), Take 1 tablet by mouth Daily., Disp: , Rfl:     cholecalciferol (VITAMIN D3) 25 MCG (1000 UT) tablet, Take 1 tablet by mouth Daily., Disp: , Rfl:     erythromycin (ROMYCIN) 5 MG/GM ophthalmic ointment, Apply  to eye(s) as directed by provider 3  (Three) Times a Day., Disp: , Rfl:     fluticasone (FLONASE) 50 MCG/ACT nasal spray, Daily., Disp: , Rfl:     galcanezumab-gnlm (Emgality) 120 MG/ML auto-injector pen, INJECT UNDER THE SKIN 2 MLS (2SYRINGES) FOR A 1 TIME DOSE, Disp: 4 mL, Rfl: 11    linaclotide (Linzess) 145 MCG capsule capsule, Take 1 capsule by mouth Every Morning Before Breakfast., Disp: 90 capsule, Rfl: 3    Restasis 0.05 % ophthalmic emulsion, Administer 1 drop to both eyes 2 (Two) Times a Day., Disp: , Rfl:     tamoxifen (NOLVADEX) 10 MG tablet, Take 0.5 tablets by mouth Daily., Disp: 45 tablet, Rfl: 3    venlafaxine XR (Effexor XR) 150 MG 24 hr capsule, Take 1 capsule by mouth Daily., Disp: 90 capsule, Rfl: 3     Allergies   Allergen Reactions    Amitiza [Lubiprostone] Other (See Comments)     Migraines    Codeine Hives and GI Intolerance    Pantoprazole Nausea And Vomiting        Social History     Socioeconomic History    Marital status:    Tobacco Use    Smoking status: Former     Current packs/day: 0.00     Average packs/day: 1 pack/day for 20.0 years (20.0 ttl pk-yrs)     Types: Cigarettes     Start date:      Quit date:      Years since quittin.2    Smokeless tobacco: Never   Vaping Use    Vaping status: Never Used   Substance and Sexual Activity    Alcohol use: Yes     Alcohol/week: 4.0 standard drinks of alcohol     Types: 4 Glasses of wine per week     Comment: 4-6 PER WK    Drug use: No    Sexual activity: Yes     Partners: Male     Birth control/protection: None        Family History   Problem Relation Age of Onset    Heart disease Mother     Inflammatory bowel disease Mother     Colon cancer Mother     Colon polyps Mother     Liver cancer Mother     Ulcerative colitis Mother     Cerebral aneurysm Father     Crohn's disease Brother     Colon polyps Brother     Heart attack Brother 48    Heart attack Brother 40    Crohn's disease Cousin     Crohn's disease Cousin     Learning disabilities Neg Hx         Review of  "Systems   All other systems reviewed and are negative.      Objective     Vitals:    25 1103   BP: 112/66   BP Location: Right arm   Patient Position: Sitting   Cuff Size: Adult   Pulse: 76   Resp: 16   Temp: 97.4 °F (36.3 °C)   TempSrc: Temporal   SpO2: 100%   Weight: 55.8 kg (123 lb)   Height: 162.6 cm (64.02\")   PainSc: 0-No pain     Body mass index is 21.1 kg/m².      Physical exam  Awake, alert, oriented x3  Pupils equal round reactive to light  Extraocular muscles intact  Face symmetric  Speech is fluent and clear  No pronator drift  Motor exam  Bilateral deltoids 5/5, bilateral biceps 5/5, bilateral triceps 5/5, bilateral wrist extension 5/5 bilateral hand  5/5  Bilateral hip flexion 5/5, bilateral knee extension 5/5, bilateral DF/PF 5/5  gait deferred  Able to detect  light touch in all 4 extremities      Assessment & Plan   Independent Review of Radiographic Studies:      I personally reviewed the images from the following studies.    No new imaging to review    Medical Decision Makin-year-old female who is being followed for a nonruptured 4 mm left ophthalmic aneurysm.    She has no new complaints and has been doing well since her office visit last year.  She would like to go 2 years with imaging and has been instructed to call or be seen sooner should she develop any headache, visual disturbance or strokelike symptoms.  She is a former smoker.    Diagnoses and all orders for this visit:    1. Cerebral aneurysm without rupture (Primary)  -     MRI Angiogram Head With & Without Contrast; Future      Return in about 1 year (around 3/18/2026).    I spent 35 minutes caring for Iliana Reyes on this date of service. This time includes time spent by me in the following activities: preparing for the visit, reviewing tests, obtaining and/or reviewing a separately obtained history, performing a medically appropriate examination and/or evaluation, counseling and educating the " patient/family/caregiver, ordering medications, tests, or procedures, referring and communicating with other health care professionals, documenting information in the medical record, independently interpreting results and communicating that information with the patient/family/caregiver, and care coordination

## 2025-03-18 ENCOUNTER — OFFICE VISIT (OUTPATIENT)
Dept: NEUROSURGERY | Facility: CLINIC | Age: 60
End: 2025-03-18
Payer: COMMERCIAL

## 2025-03-18 VITALS
DIASTOLIC BLOOD PRESSURE: 66 MMHG | HEART RATE: 76 BPM | SYSTOLIC BLOOD PRESSURE: 112 MMHG | TEMPERATURE: 97.4 F | WEIGHT: 123 LBS | BODY MASS INDEX: 21 KG/M2 | OXYGEN SATURATION: 100 % | HEIGHT: 64 IN | RESPIRATION RATE: 16 BRPM

## 2025-03-18 DIAGNOSIS — I67.1 CEREBRAL ANEURYSM WITHOUT RUPTURE: Primary | ICD-10-CM

## 2025-03-18 RX ORDER — ERYTHROMYCIN 5 MG/G
OINTMENT OPHTHALMIC 3 TIMES DAILY
COMMUNITY
Start: 2025-02-27

## 2025-04-16 ENCOUNTER — OFFICE VISIT (OUTPATIENT)
Dept: ONCOLOGY | Facility: CLINIC | Age: 60
End: 2025-04-16
Payer: COMMERCIAL

## 2025-04-16 VITALS
TEMPERATURE: 97.8 F | BODY MASS INDEX: 21.41 KG/M2 | WEIGHT: 125.4 LBS | HEART RATE: 75 BPM | DIASTOLIC BLOOD PRESSURE: 78 MMHG | OXYGEN SATURATION: 97 % | SYSTOLIC BLOOD PRESSURE: 124 MMHG | HEIGHT: 64 IN

## 2025-04-16 DIAGNOSIS — N60.91 ATYPICAL DUCTAL HYPERPLASIA OF RIGHT BREAST: Primary | ICD-10-CM

## 2025-04-16 PROCEDURE — 99214 OFFICE O/P EST MOD 30 MIN: CPT | Performed by: INTERNAL MEDICINE

## 2025-04-16 NOTE — PROGRESS NOTES
Subjective   Iliana Reyes is a 59 y.o. female.  Referred by Dr. Head for right breast atypical ductal hyperplasia, focal    History of Present Illness   Ms. Reyes is a 59-year-old postmenopausal  lady who has been on hormone replacement therapy with estradiol patch for 15 years presents with a palpable abnormality of the right breast.    1/9/2023-bilateral screening mammogram benign.    3/2/2023-right breast diagnostic mammogram after palpating a right breast mass for about 6 to 8 weeks.  No suspicious focal mammographic abnormalities are identified deep to the marker.  No suspicious masses, calcifications or areas of architectural distortion    Ultrasound  At 3:00, 3 cm from the nipple and 4:00, 1 to 2 cm from the nipple ultrasound was performed.  At 4:00, 2 cm from the nipple there is approximately 0.6 time 0.5 x 0.5 cm masslike area with indistinct margins which may reflect focal dense fibroglandular tissue but is suspicious.  At 4:00, 1 cm from the nipple there is a 0.4 cm benign-appearing cyst.    Impression  Suspicious 0.6 cm masslike area at 4:00 in the right breast.  Ultrasound-guided core needle biopsy recommended.    3/29/2023-ultrasound-guided core needle biopsy  Pathology consistent with rare minute focus of cribriform atypical ductal hyperplasia.  Fibroadenomatoid hyperplasia, focally clustered ductal cyst.  No carcinoma in situ.    8/10/2023-right breast lumpectomy  Fibrocystic change, apocrine cyst, adenosis and usual ductal hyperplasia and cautery artifact.  No residual atypical hyperplasia carcinoma in situ or invasive carcinoma.    Bilateral breast MRI 6/28/2023-non-mass enhancement measuring 1.2 cm at 4:00 middle right breast with a focus of susceptibility from biopsy clip marking the site of biopsy-proven atypia.  Recommend surgical management  No MRI evidence of malignancy in the left breast.    Tamoxifen started January 2024    Interval history  Ms. Reyes returns today for  follow-up.  She continues on tamoxifen 5 mg daily and tolerating that well.  She does struggle with hot flashes and these have not changed significantly.  Denies any new breast masses.  Reports right breast tenderness at the surgical site this has not changed either.  Recent screening mammogram from January 2025 benign.    The following portions of the patient's history were reviewed and updated as appropriate: allergies, current medications, past family history, past medical history, past social history, past surgical history, and problem list.    Past Medical History:   Diagnosis Date    Aneurysm     OPTICAL-TODNAM    Leiva esophagus     Chest pain 2016    Gastric ulcer     GERD (gastroesophageal reflux disease)     History of migraine     Pyelonephritis 10/31/2022    Tubular adenoma of colon         Past Surgical History:   Procedure Laterality Date    BREAST BIOPSY      BREAST LUMPECTOMY Right 8/10/2023    Procedure: RIGHT breast ANALI guided excision atypical hyperplasia;  Surgeon: Alissa Head MD;  Location: [facility];  Service: General;  Laterality: Right;    CHOLECYSTECTOMY      Dr. EDER Alba    COLONOSCOPY  05/18/2016    polyp, tics, tubular adenoma    COLONOSCOPY  06/05/2019    normal ileum, diverticulosis, NBIH, TAx1    ENDOSCOPY  05/18/2016    gastric ulcer w/clean base, acute gastritis. Leiva's esophagus    ENDOSCOPY  06/05/2019    normal esophagus/duodenum, acute gastritis    HYSTERECTOMY      OOPHORECTOMY      SHOULDER ARTHROSCOPY Left     Dr. Plunkett    UPPER GASTROINTESTINAL ENDOSCOPY  05/18/2016        Family History   Problem Relation Age of Onset    Heart disease Mother     Inflammatory bowel disease Mother     Colon cancer Mother     Colon polyps Mother     Liver cancer Mother     Ulcerative colitis Mother     Cerebral aneurysm Father     Crohn's disease Brother     Colon polyps Brother     Heart attack Brother 48    Heart attack Brother 40    Crohn's disease Cousin     Crohn's  "disease Cousin     Learning disabilities Neg Hx         Social History     Socioeconomic History    Marital status:    Tobacco Use    Smoking status: Former     Current packs/day: 0.00     Average packs/day: 1 pack/day for 20.0 years (20.0 ttl pk-yrs)     Types: Cigarettes     Start date:      Quit date:      Years since quittin.2    Smokeless tobacco: Never   Vaping Use    Vaping status: Never Used   Substance and Sexual Activity    Alcohol use: Yes     Alcohol/week: 4.0 standard drinks of alcohol     Types: 4 Glasses of wine per week     Comment: 4-6 PER WK    Drug use: No    Sexual activity: Yes     Partners: Male     Birth control/protection: None        OB History          2    Para   2    Term   2            AB        Living             SAB        IAB        Ectopic        Molar        Multiple        Live Births   2             Age at menarche-14  Age at first live childbirth-21   2 para 2  0  Underwent a hysterectomy and salpingo-oophorectomy at the age of 42 for abnormal Pap smears.  Hormone replacement therapy-15 years with estradiol patch    Allergies   Allergen Reactions    Amitiza [Lubiprostone] Other (See Comments)     Migraines    Codeine Hives and GI Intolerance    Pantoprazole Nausea And Vomiting          Review of systems as mentioned HPI otherwise negative    Objective   Blood pressure 124/78, pulse 75, temperature 97.8 °F (36.6 °C), temperature source Oral, height 162.6 cm (64.02\"), weight 56.9 kg (125 lb 6.4 oz), SpO2 97%, not currently breastfeeding.     Physical Exam  Vitals reviewed.   Constitutional:       Appearance: Normal appearance.   HENT:      Head: Normocephalic and atraumatic.      Right Ear: External ear normal.      Left Ear: External ear normal.      Nose: Nose normal.      Mouth/Throat:      Pharynx: Oropharynx is clear.   Eyes:      Conjunctiva/sclera: Conjunctivae normal.   Cardiovascular:      Rate and Rhythm: Normal rate. "   Pulmonary:      Effort: Pulmonary effort is normal.   Abdominal:      General: Abdomen is flat.   Musculoskeletal:         General: Normal range of motion.   Skin:     General: Skin is warm.   Neurological:      Mental Status: She is alert.   Psychiatric:         Mood and Affect: Mood normal.         Behavior: Behavior normal.         Thought Content: Thought content normal.         Judgment: Judgment normal.       Breast Exam: Right breast on inspection there is a scar in the periareolar region which is healing well.  Tender to palpation.  No new palpable abnormalities.  Left breast appears normal inspection.  No palpable abnormalities of the left breast      I have reexamined the patient and the results are consistent with the previously documented exam. Amada Guillen MD      No visits with results within 30 Day(s) from this visit.   Latest known visit with results is:   Office Visit on 02/04/2025   Component Date Value Ref Range Status    Diagnosis 02/04/2025 Comment   Final    NEGATIVE FOR INTRAEPITHELIAL LESION OR MALIGNANCY.    Specimen adequacy: 02/04/2025 Comment   Final    Comment: Satisfactory for evaluation.  Endocervical and/or squamous metaplastic  cells (endocervical component) are present.      Clinician Provided ICD-10: 02/04/2025 Comment   Final    Z01.419    Performed by: 02/04/2025 Comment   Final    Salvador Galo, Cytotechnologist (ASCP)    . 02/04/2025 .   Final    Note: 02/04/2025 Comment   Final    Comment: The Pap smear is a screening test designed to aid in the detection of  premalignant and malignant conditions of the uterine cervix.  It is not a  diagnostic procedure and should not be used as the sole means of detecting  cervical cancer.  Both false-positive and false-negative reports do occur.      Method: 02/04/2025 Comment   Final    Comment: This liquid based ThinPrep(R) pap test was screened with the  use of an image guided system.      HPV Aptima 02/04/2025 Negative  Negative  Final    Comment: This nucleic acid amplification test detects fourteen high-risk  HPV types (16,18,31,33,35,39,45,51,52,56,58,59,66,68) without  differentiation.          No radiology results for the last 30 days.       Assessment & Plan       *Focal atypical ductal hyperplasia  Discussed increased lifetime risk of breast cancer associated with ADH.  Given that the ADH is focal risk is likely on the low side.  20-year is likely around 24%.  She has discontinued the estradiol patch.  Started tamoxifen 5 mg in January 2024  Continues on tamoxifen and no evidence of malignancy.  1/31/2025-bilateral screening mammogram-images independently reviewed and interpreted by me.  Noted to be benign.    *Hormone replacement therapy  Patient has been on estradiol patch for almost 15 years.  This has been stopped.  Continues with significant hot flashes.    *Hot flashes-continue Effexor 75 mg daily.  Even on Effexor is having hot flashes multiple times a day, occasionally they are drenching.  She does feel this is tolerable, though does question other options to help with her hot flashes.  Discussed gabapentin, she has been on this previously and did not tolerate even a 100 mg dose of gabapentin.  We also discussed Veozah, however reviewed risk of elevated liver function studies and need for close lab monitoring with this, therefore she would like to hold off on Veozah and continue Effexor at this time.  Hot flashes unchanged.    *Risk reduction  Continue low-dose tamoxifen      *Surveillance      PLAN:   Continue Tamoxifen 5 mg daily, initiated January 2024.  Continue Effexor 75 mg daily.  Unable to tolerate gabapentin.  Discussed Veozah, however discussed risk of elevated liver function studies so patient would like to hold off on this.  OCTAVIO in 6 months and MD in 1 year  Bilateral breast MRI July 2025  Screening mammogram January 2026    Patient is on medications requiring close monitoring for toxicities.

## 2025-06-25 NOTE — TELEPHONE ENCOUNTER
Patient called. Advised received request for Linzess refill. Scheduled yearly follow up appointment with Leesa on 6/26@0817. Leesa will refill script at this time.

## 2025-06-26 ENCOUNTER — OFFICE VISIT (OUTPATIENT)
Dept: GASTROENTEROLOGY | Facility: CLINIC | Age: 60
End: 2025-06-26
Payer: COMMERCIAL

## 2025-06-26 VITALS
BODY MASS INDEX: 20.96 KG/M2 | HEIGHT: 64 IN | SYSTOLIC BLOOD PRESSURE: 120 MMHG | TEMPERATURE: 97.3 F | DIASTOLIC BLOOD PRESSURE: 78 MMHG | WEIGHT: 122.8 LBS | HEART RATE: 73 BPM

## 2025-06-26 DIAGNOSIS — K59.04 CHRONIC IDIOPATHIC CONSTIPATION: Primary | ICD-10-CM

## 2025-06-26 DIAGNOSIS — R12 HEARTBURN: ICD-10-CM

## 2025-06-26 PROCEDURE — 99213 OFFICE O/P EST LOW 20 MIN: CPT | Performed by: PHYSICIAN ASSISTANT

## 2025-06-26 NOTE — PROGRESS NOTES
"Chief Complaint  Constipation    Subjective          History Of Present Illness:    Iliana Reyes is a  59 y.o. female patient of Dr. Lyons who presents as a follow up for heartburn and constipation.     Patient overall is currently doing well in regard to her constipation. She continues to take Linzess 145 mcg daily. She reports normal formed bowel movements with this therapy. She denies any abdominal pain or blood in the stool. She does have some occasional heartburn but continues to use Tums as needed.  She feels like she is having heartburn frequently.  She denies any dysphagia, odynophagia, nausea, vomiting. Appetite is good and weight is stable     Additional data reviewed:  Colonoscopy, Scan (03/11/2024) -normal TI, nonbleeding internal hemorrhoids, diverticulosis of the sigmoid colon.  Recall 5 years.    Objective   Vital Signs:   /78 (BP Location: Left arm, Patient Position: Sitting, Cuff Size: Adult)   Pulse 73   Temp 97.3 °F (36.3 °C) (Temporal)   Ht 162.6 cm (64.02\")   Wt 55.7 kg (122 lb 12.8 oz)   BMI 21.07 kg/m²       Physical Exam  Vitals reviewed.   Constitutional:       General: She is not in acute distress.     Appearance: Normal appearance. She is not ill-appearing.   HENT:      Head: Normocephalic and atraumatic.      Nose: Nose normal.      Mouth/Throat:      Pharynx: Oropharynx is clear.   Eyes:      Conjunctiva/sclera: Conjunctivae normal.   Pulmonary:      Effort: Pulmonary effort is normal.   Abdominal:      General: There is no distension.      Palpations: Abdomen is soft. There is no mass.      Tenderness: There is no abdominal tenderness.   Musculoskeletal:         General: No swelling. Normal range of motion.      Cervical back: Normal range of motion.   Skin:     General: Skin is warm and dry.      Findings: No bruising or rash.   Neurological:      General: No focal deficit present.      Mental Status: She is alert and oriented to person, place, and time.      Motor: No " weakness.      Gait: Gait normal.   Psychiatric:         Mood and Affect: Mood normal.          Result Review :   The following data was reviewed by: Leesa Geiger PA-C on 06/26/2025:  CMP          10/21/2024    07:41   CMP   Glucose 90    BUN 12    Creatinine 0.63    EGFR 103.6    Sodium 143    Potassium 4.9    Chloride 103    Calcium 9.7    Total Protein 6.8    Albumin 4.4    Globulin 2.4    Total Bilirubin 0.7    Alkaline Phosphatase 69    AST (SGOT) 23    ALT (SGPT) 11    Albumin/Globulin Ratio 1.8    BUN/Creatinine Ratio 19.0              Assessment and Plan    Diagnoses and all orders for this visit:    1. Chronic idiopathic constipation (Primary)    2. Heartburn       Continue Linzess 145 mcg  Consider famotidine daily if she is using Tums frequently.    Follow Up   Return in about 1 year (around 6/26/2026) for Leesa Goetz PA-C.    Dragon dictation used throughout this note.            Leesa Goetz PA-C   [facility]  [address] [place] [address]  Office: [phone]

## 2025-06-29 ENCOUNTER — PATIENT MESSAGE (OUTPATIENT)
Dept: GASTROENTEROLOGY | Facility: CLINIC | Age: 60
End: 2025-06-29
Payer: COMMERCIAL

## 2025-07-16 ENCOUNTER — HOSPITAL ENCOUNTER (OUTPATIENT)
Dept: MRI IMAGING | Facility: HOSPITAL | Age: 60
Discharge: HOME | End: 2025-07-16
Admitting: INTERNAL MEDICINE
Payer: COMMERCIAL

## 2025-07-16 DIAGNOSIS — N60.91 ATYPICAL DUCTAL HYPERPLASIA OF RIGHT BREAST: ICD-10-CM

## 2025-07-16 PROCEDURE — 77049 MRI BREAST C-+ W/CAD BI: CPT

## 2025-07-16 PROCEDURE — 25510000002 GADOBENATE DIMEGLUMINE 529 MG/ML SOLUTION: Performed by: INTERNAL MEDICINE

## 2025-07-16 PROCEDURE — A9577 INJ MULTIHANCE: HCPCS | Performed by: INTERNAL MEDICINE

## 2025-07-16 RX ADMIN — GADOBENATE DIMEGLUMINE 11 ML: 529 INJECTION, SOLUTION INTRAVENOUS at 16:08

## (undated) DEVICE — TBG PENCL TELESCP MEGADYNE SMOKE EVAC 10FT

## (undated) DEVICE — DRSNG WND BORDR/ADHS NONADHR/GZ LF 4X4IN STRL

## (undated) DEVICE — NDL HYPO PRECISIONGLIDE REG 25G 1 1/2

## (undated) DEVICE — ANTIBACTERIAL UNDYED BRAIDED (POLYGLACTIN 910), SYNTHETIC ABSORBABLE SUTURE: Brand: COATED VICRYL

## (undated) DEVICE — INTENDED FOR TISSUE SEPARATION, AND OTHER PROCEDURES THAT REQUIRE A SHARP SURGICAL BLADE TO PUNCTURE OR CUT.: Brand: BARD-PARKER ® CARBON RIB-BACK BLADES

## (undated) DEVICE — STRIP,CLOSURE,WOUND,MEDI-STRIP,1/2X4: Brand: MEDLINE

## (undated) DEVICE — SMOKE EVACUATION TUBING WITH 7/8 IN TO 1/4 IN REDUCER: Brand: BUFFALO FILTER

## (undated) DEVICE — SKIN PREP TRAY W/CHG: Brand: MEDLINE INDUSTRIES, INC.

## (undated) DEVICE — PK CHST BRST 40

## (undated) DEVICE — CONN TBG Y 5 IN 1 LF STRL

## (undated) DEVICE — TRAP FLD MINIVAC MEGADYNE 100ML

## (undated) DEVICE — GLV SURG BIOGEL LTX PF 7

## (undated) DEVICE — SHEATH GUIDE SCOUT SURG TPR 8.3TO3.2CM 264CM STRL

## (undated) DEVICE — DRSNG WND GZ PAD BORDERED 4X8IN STRL

## (undated) DEVICE — SUT MNCRYL PLS ANTIB UD 4/0 PS2 18IN